# Patient Record
Sex: FEMALE | Race: WHITE | Employment: OTHER | URBAN - METROPOLITAN AREA
[De-identification: names, ages, dates, MRNs, and addresses within clinical notes are randomized per-mention and may not be internally consistent; named-entity substitution may affect disease eponyms.]

---

## 2017-01-23 DIAGNOSIS — E78.2 MIXED HYPERLIPIDEMIA: Primary | ICD-10-CM

## 2017-01-24 RX ORDER — SIMVASTATIN 20 MG
20 TABLET ORAL AT BEDTIME
Qty: 30 TABLET | Refills: 1 | Status: SHIPPED | OUTPATIENT
Start: 2017-01-24 | End: 2017-03-14

## 2017-01-30 DIAGNOSIS — E11.65 TYPE 2 DIABETES MELLITUS WITH HYPERGLYCEMIA, WITHOUT LONG-TERM CURRENT USE OF INSULIN (H): Primary | ICD-10-CM

## 2017-01-30 RX ORDER — PIOGLITAZONEHYDROCHLORIDE 15 MG/1
15 TABLET ORAL DAILY
Qty: 30 TABLET | Refills: 0 | Status: SHIPPED | OUTPATIENT
Start: 2017-01-30 | End: 2017-03-14

## 2017-01-30 NOTE — TELEPHONE ENCOUNTER
Pioglitazone         Last Written Prescription Date: 10/11/2016  Last Fill Quantity: 30, # refills: 3  Last Office Visit with [facility], [facility] or [facility] prescribing provider:  10/11/2016        BP Readings from Last 3 Encounters:   10/11/16 119/80   01/11/10 146/82   07/17/09 124/62     No results found for this basename: microl  No results found for this basename: microalbumin  CREATININE   Date Value Ref Range Status   03/21/2016 0.70 0.55 - 1.02 mg/dL Final   ]  GFR ESTIMATE   Date Value Ref Range Status   03/21/2016 >60 >60 ml/min/1.73m2 Final   11/06/2015 >60 >60 ml/min/1.73m2 Final   03/12/2009 >90 >60 mL/min/1.7m2 Final     GFR ESTIMATE IF BLACK   Date Value Ref Range Status   03/21/2016 >60 >60 ml/min/1.73m2 Final   11/06/2015 >60 >60 ml/min/1.73m2 Final   03/12/2009 >90 >60 mL/min/1.7m2 Final     CHOL      163   3/21/2016  HDL       51   3/21/2016  LDL       65   3/21/2016  LDL       78   2/24/2015  TRIG      235   3/21/2016  CHOLHDLRATIO      3.2   3/21/2016  No results found for this basename: ast  No results found for this basename: alt  A1C      8.5   10/11/2016  A1C     11.1   6/13/2016  A1C      8.2   3/21/2016  A1C      6.8   11/9/2015  A1C      7.1   6/23/2015  POTASSIUM   Date Value Ref Range Status   03/21/2016 4.2 3.5 - 5.1 mmol/L Final     Kiet BULL (R)

## 2017-01-30 NOTE — TELEPHONE ENCOUNTER
Prescription approved per [facility] Refill Protocol.    Molly QUINONEZ RN

## 2017-03-14 ENCOUNTER — OFFICE VISIT (OUTPATIENT)
Dept: FAMILY MEDICINE | Facility: CLINIC | Age: 52
End: 2017-03-14
Payer: COMMERCIAL

## 2017-03-14 VITALS
HEART RATE: 82 BPM | SYSTOLIC BLOOD PRESSURE: 118 MMHG | DIASTOLIC BLOOD PRESSURE: 68 MMHG | WEIGHT: 293 LBS | HEIGHT: 67 IN | BODY MASS INDEX: 45.99 KG/M2

## 2017-03-14 DIAGNOSIS — M43.17 SPONDYLOLISTHESIS AT L5-S1 LEVEL: ICD-10-CM

## 2017-03-14 DIAGNOSIS — F33.1 MODERATE EPISODE OF RECURRENT MAJOR DEPRESSIVE DISORDER (H): ICD-10-CM

## 2017-03-14 DIAGNOSIS — E78.2 MIXED HYPERLIPIDEMIA: ICD-10-CM

## 2017-03-14 DIAGNOSIS — E11.65 TYPE 2 DIABETES MELLITUS WITH HYPERGLYCEMIA, WITHOUT LONG-TERM CURRENT USE OF INSULIN (H): Primary | ICD-10-CM

## 2017-03-14 DIAGNOSIS — E11.65 TYPE 2 DIABETES MELLITUS WITH HYPERGLYCEMIA, WITHOUT LONG-TERM CURRENT USE OF INSULIN (H): ICD-10-CM

## 2017-03-14 LAB
ANION GAP SERPL CALCULATED.3IONS-SCNC: 7 MMOL/L (ref 3–14)
BUN SERPL-MCNC: 10 MG/DL (ref 7–30)
CALCIUM SERPL-MCNC: 8.9 MG/DL (ref 8.5–10.1)
CHLORIDE SERPL-SCNC: 107 MMOL/L (ref 94–109)
CHOLEST SERPL-MCNC: 166 MG/DL
CO2 SERPL-SCNC: 28 MMOL/L (ref 20–32)
CREAT SERPL-MCNC: 0.53 MG/DL (ref 0.52–1.04)
GFR SERPL CREATININE-BSD FRML MDRD: ABNORMAL ML/MIN/1.7M2
GLUCOSE SERPL-MCNC: 214 MG/DL (ref 70–99)
HBA1C MFR BLD: 8.9 % (ref 4.3–6)
LDLC SERPL DIRECT ASSAY-MCNC: 94 MG/DL
POTASSIUM SERPL-SCNC: 3.8 MMOL/L (ref 3.4–5.3)
SODIUM SERPL-SCNC: 142 MMOL/L (ref 133–144)

## 2017-03-14 PROCEDURE — 82465 ASSAY BLD/SERUM CHOLESTEROL: CPT | Performed by: FAMILY MEDICINE

## 2017-03-14 PROCEDURE — 80048 BASIC METABOLIC PNL TOTAL CA: CPT | Performed by: FAMILY MEDICINE

## 2017-03-14 PROCEDURE — 36415 COLL VENOUS BLD VENIPUNCTURE: CPT | Performed by: FAMILY MEDICINE

## 2017-03-14 PROCEDURE — 83036 HEMOGLOBIN GLYCOSYLATED A1C: CPT | Performed by: FAMILY MEDICINE

## 2017-03-14 PROCEDURE — 83721 ASSAY OF BLOOD LIPOPROTEIN: CPT | Performed by: FAMILY MEDICINE

## 2017-03-14 PROCEDURE — 99214 OFFICE O/P EST MOD 30 MIN: CPT | Performed by: FAMILY MEDICINE

## 2017-03-14 RX ORDER — PIOGLITAZONEHYDROCHLORIDE 45 MG/1
45 TABLET ORAL DAILY
Qty: 90 TABLET | Refills: 0 | Status: SHIPPED | OUTPATIENT
Start: 2017-03-14 | End: 2017-05-24

## 2017-03-14 RX ORDER — VENLAFAXINE 75 MG/1
75 TABLET ORAL 2 TIMES DAILY
Qty: 60 TABLET | Refills: 1 | Status: SHIPPED | OUTPATIENT
Start: 2017-03-14 | End: 2017-06-13

## 2017-03-14 RX ORDER — SIMVASTATIN 20 MG
20 TABLET ORAL AT BEDTIME
Qty: 30 TABLET | Refills: 1 | Status: SHIPPED | OUTPATIENT
Start: 2017-03-14 | End: 2017-05-18

## 2017-03-14 ASSESSMENT — ANXIETY QUESTIONNAIRES
3. WORRYING TOO MUCH ABOUT DIFFERENT THINGS: SEVERAL DAYS
6. BECOMING EASILY ANNOYED OR IRRITABLE: SEVERAL DAYS
IF YOU CHECKED OFF ANY PROBLEMS ON THIS QUESTIONNAIRE, HOW DIFFICULT HAVE THESE PROBLEMS MADE IT FOR YOU TO DO YOUR WORK, TAKE CARE OF THINGS AT HOME, OR GET ALONG WITH OTHER PEOPLE: SOMEWHAT DIFFICULT
2. NOT BEING ABLE TO STOP OR CONTROL WORRYING: SEVERAL DAYS
1. FEELING NERVOUS, ANXIOUS, OR ON EDGE: MORE THAN HALF THE DAYS
GAD7 TOTAL SCORE: 7
7. FEELING AFRAID AS IF SOMETHING AWFUL MIGHT HAPPEN: NOT AT ALL
5. BEING SO RESTLESS THAT IT IS HARD TO SIT STILL: SEVERAL DAYS

## 2017-03-14 ASSESSMENT — PAIN SCALES - GENERAL: PAINLEVEL: MODERATE PAIN (4)

## 2017-03-14 ASSESSMENT — PATIENT HEALTH QUESTIONNAIRE - PHQ9: 5. POOR APPETITE OR OVEREATING: SEVERAL DAYS

## 2017-03-14 NOTE — PATIENT INSTRUCTIONS
Increase the pioglitazone to 45 mg daily.    Increase venlafaxine to twice daily.    Schedule the Open MRI when  You can    Recheck in 3 months

## 2017-03-14 NOTE — MR AVS SNAPSHOT
"              After Visit Summary   3/14/2017    Yuki AGEE    MRN: [id number]           Patient Information     Date Of Birth          1965        Visit Information        Provider Department      3/14/2017 3:00 PM KOTA Hernández MD [facility]        Today's Diagnoses     Type 2 diabetes mellitus with hyperglycemia, without long-term current use of insulin (H)    -  1    Mixed hyperlipidemia        Spondylolisthesis at L5-S1 level        Moderate episode of recurrent major depressive disorder (H)          Care Instructions    Increase the pioglitazone to 45 mg daily.    Increase venlafaxine to twice daily.    Schedule the Open MRI when  You         Follow-ups after your visit        Future tests that were ordered for you today     Open Future Orders        Priority Expected Expires Ordered    MR Lumbar Spine w/o Contrast Routine  3/14/2018 3/14/2017            Who to contact     If you have questions or need follow up information about today's clinic visit or your schedule please contact [facility] directly at [phone].  Normal or non-critical lab and imaging results will be communicated to you by NXTMhart, letter or phone within 4 business days after the clinic has received the results. If you do not hear from us within 7 days, please contact the clinic through [employer] or phone. If you have a critical or abnormal lab result, we will notify you by phone as soon as possible.  Submit refill requests through [employer] or call your pharmacy and they will forward the refill request to us. Please allow 3 business days for your refill to be completed.          Additional Information About Your Visit        MyChart Information     [employer] lets you send messages to your doctor, view your test results, renew your prescriptions, schedule appointments and more. To sign up, go to [email or web address] . Click on \"Log in\" on the left side of the screen, which will take you to " "the Welcome page. Then click on \"Sign up Now\" on the right side of the page.     You will be asked to enter the access code listed below, as well as some personal information. Please follow the directions to create your username and password.     Your access code is: [email or web address]  Expires: 2017  3:41 PM     Your access code will  in 90 days. If you need help or a new code, please call your [place] clinic or [phone].        Care EveryWhere ID     This is your Care EveryWhere ID. This could be used by other organizations to access your [place] medical records  [id number]        Your Vitals Were     Pulse Height Breastfeeding? BMI (Body Mass Index)          82 5' 7\" (1.702 m) No 52.47 kg/m2         Blood Pressure from Last 3 Encounters:   17 118/68   10/11/16 119/80   01/11/10 146/82    Weight from Last 3 Encounters:   17 (!) 335 lb (152 kg)   10/11/16 (!) 315 lb (142.9 kg)   01/11/10 (!) 332 lb (150.6 kg)                 Today's Medication Changes          These changes are accurate as of: 3/14/17  3:41 PM.  If you have any questions, ask your nurse or doctor.               These medicines have changed or have updated prescriptions.        Dose/Directions    pioglitazone 45 MG tablet   Commonly known as:  ACTOS   This may have changed:    - medication strength  - how much to take  - additional instructions   Used for:  Type 2 diabetes mellitus with hyperglycemia, without long-term current use of insulin (H)   Changed by:  KOTA Hernández MD        Dose:  45 mg   Take 1 tablet (45 mg) by mouth daily   Quantity:  90 tablet   Refills:  0       venlafaxine 75 MG tablet   Commonly known as:  EFFEXOR   This may have changed:  when to take this   Used for:  Moderate episode of recurrent major depressive disorder (H)   Changed by:  KOTA Hernández MD        Dose:  75 mg   Take 1 tablet (75 mg) by mouth 2 times daily   Quantity:  60 tablet   Refills:  1            Where to get your medicines    "   [facility] [place] [place] [facility] [address]  [address], [place] [address]    Hours:  [facility] [employer] Phone:  [phone]     metFORMIN 1000 MG tablet    pioglitazone 45 MG tablet    simvastatin 20 MG tablet    venlafaxine 75 MG tablet                Primary Care Provider Office Phone # Fax #    Maribel Baker [phone]       [address] [address]        Thank you!     Thank you for choosing [facility]  for your care. Our goal is always to provide you with excellent care. Hearing back from our patients is one way we can continue to improve our services. Please take a few minutes to complete the written survey that you may receive in the mail after your visit with us. Thank you!             Your Updated Medication List - Protect others around you: Learn how to safely use, store and throw away your medicines at www.disposemymeds.org.          This list is accurate as of: 3/14/17  3:41 PM.  Always use your most recent med list.                   Brand Name Dispense Instructions for use    aspirin 81 MG tablet      Take by mouth daily       calcium 600 + D 600-400 MG-UNIT per tablet   Generic drug:  calcium-vitamin D     0    1 tablet daily       FLUTICASONE PROPIONATE (NASAL) 50 MCG/ACT Susp     1 bottle    1 spray each nostril 1-2 times per day       glipiZIDE 10 MG tablet    GLUCOTROL     Take 10 mg by mouth 2 times daily (before meals)       lisinopril-hydrochlorothiazide 10-12.5 MG per tablet    PRINZIDE/ZESTORETIC     Take 1 tablet by mouth daily       loratadine 10 MG tablet    CLARITIN     Take 10 mg by mouth daily       metFORMIN 1000 MG tablet    GLUCOPHAGE    90 tablet    Take 1 tablet (1,000 mg) by mouth 2 times daily (with meals)       pioglitazone 45 MG tablet    ACTOS    90 tablet    Take 1 tablet (45 mg) by mouth daily       simvastatin 20 MG  tablet    ZOCOR    30 tablet    Take 1 tablet (20 mg) by mouth At Bedtime       TYLENOL 8 HOUR 650 MG CR tablet   Generic drug:  acetaminophen      Take 650 mg by mouth every 8 hours as needed for mild pain or fever       venlafaxine 75 MG tablet    EFFEXOR    60 tablet    Take 1 tablet (75 mg) by mouth 2 times daily

## 2017-03-14 NOTE — PROGRESS NOTES
Subjective:  Yuki AGEE is a 52 year old female   Chief Complaint   Patient presents with     Back Pain     She has known spondylolisthesis and several years ago had an episode where she felt a sharp pain and pop in her low back by the sacrum. Interestingly, at that time she had relief of some of the radicular pain down into her legs. Now a couple weeks ago she had a similar episode and since then has had worsening numbness and tingling in both legs. There is no loss of strength or change in bowel or bladder control        Encounter Diagnoses   Name Primary?     Type 2 diabetes mellitus with hyperglycemia, without long-term current use of insulin (H) Yes     Mixed hyperlipidemia      Spondylolisthesis at L5-S1 level      Moderate episode of recurrent major depressive disorder (H)      Diabetes: She has been taking her medications as prescribed without side effects. Her activity level has been limited because of the back problems discussed above    Hyperlipidemia: She is tolerating her statin without significant muscle pain    Depression: Her depression has been somewhat worse so she would like to go to taking her venlafaxine twice a day which she has done in the past with good results. Current symptoms include:  PHQ-9 ([employer]) 3/14/2017   No Interest In Doing Things    Feeling Depressed    Trouble Sleeping    Tired / No Energy    No appetite or Over-Eating    Feeling Bad about Self    Trouble Concentrating    Moving Slow or Restless    Suicidal Thoughts    Total Score    1.  Little interest or pleasure in doing things 1   2.  Feeling down, depressed, or hopeless 1   3.  Trouble falling or staying asleep, or sleeping too much 1   4.  Feeling tired or having little energy 1   5.  Poor appetite or overeating 1   6.  Feeling bad about yourself 0   7.  Trouble concentrating 1   8.  Moving slowly or restless 0   9.  Suicidal or self-harm thoughts 0   PHQ-9 Total Score 6   Difficulty at work, home, or with people  Somewhat difficult        ROS: 5 point ROS neg other than the symptoms noted above in the HPI.      Medical, surgical, social, and family histories, medications and allergies reviewed and updated.    Objective:  Exam:    GENERAL APPEARANCE ADULT: Alert, no acute distress  EYES: PERRL, EOM normal, conjunctiva and lids normal  RESP: lungs clear to auscultation   CV: normal rate, regular rhythm, no murmur or gallop  MS: back exam: Tenderness at the L5-S1 region  NEURO: Reflexes difficult to elicit at both the knees and ankles, sensation altered with numbness of both legs symmetrically    Results for orders placed or performed in visit on 03/14/17   Hemoglobin A1c   Result Value Ref Range    Hemoglobin A1C 8.9 (H) 4.3 - 6.0 %        ASSESSMENT:  1. Type 2 diabetes mellitus with hyperglycemia, without long-term current use of insulin (H)    2. Mixed hyperlipidemia    3. Spondylolisthesis at L5-S1 level    4. Moderate episode of recurrent major depressive disorder (H)        PLAN:  Orders Placed This Encounter     MR Lumbar Spine w/o Contrast  To evaluate whether there is significant nerve impingement from a recent slipped spondylolisthesis      Basic metabolic panel     Cholesterol     LDL cholesterol direct     Hemoglobin A1c     pioglitazone (ACTOS) 45 MG tablet     metFORMIN (GLUCOPHAGE) 1000 MG tablet     simvastatin (ZOCOR) 20 MG tablet     venlafaxine (EFFEXOR) 75 MG tablet       Patient Instructions   Increase the pioglitazone to 45 mg daily.    Increase venlafaxine to twice daily.    Schedule the Open MRI when  You can   Recheck in 3 months

## 2017-03-15 ASSESSMENT — ANXIETY QUESTIONNAIRES: GAD7 TOTAL SCORE: 7

## 2017-03-15 ASSESSMENT — PATIENT HEALTH QUESTIONNAIRE - PHQ9: SUM OF ALL RESPONSES TO PHQ QUESTIONS 1-9: 6

## 2017-03-16 DIAGNOSIS — E11.65 TYPE 2 DIABETES MELLITUS WITH HYPERGLYCEMIA, WITHOUT LONG-TERM CURRENT USE OF INSULIN (H): ICD-10-CM

## 2017-03-16 RX ORDER — PIOGLITAZONEHYDROCHLORIDE 45 MG/1
45 TABLET ORAL DAILY
Qty: 90 TABLET | Refills: 0 | Status: CANCELLED | OUTPATIENT
Start: 2017-03-16

## 2017-03-22 RX ORDER — GLIPIZIDE 10 MG/1
10 TABLET ORAL
Qty: 30 TABLET | Refills: 0 | Status: SHIPPED | OUTPATIENT
Start: 2017-03-22 | End: 2017-04-11

## 2017-03-22 NOTE — TELEPHONE ENCOUNTER
3rd request from pharmacy - Routed to care team to address refill please    Glipizide         Last Written Prescription Date: Historical  Last Fill Quantity: ?, # refills: ?  Last Office Visit with [facility], [facility] or [facility] prescribing provider: 03/14/17       BP Readings from Last 3 Encounters:   03/14/17 118/68   10/11/16 119/80   01/11/10 146/82     No results found for: MICROL  No results found for: MICROALBUMIN  Creatinine   Date Value Ref Range Status   03/14/2017 0.53 0.52 - 1.04 mg/dL Final   ]  GFR Estimate   Date Value Ref Range Status   03/14/2017 >90  Non  GFR Calc   >60 mL/min/1.7m2 Final   03/21/2016 >60 >60 ml/min/1.73m2 Final   11/06/2015 >60 >60 ml/min/1.73m2 Final     GFR Estimate If Black   Date Value Ref Range Status   03/14/2017 >90   GFR Calc   >60 mL/min/1.7m2 Final   03/21/2016 >60 >60 ml/min/1.73m2 Final   11/06/2015 >60 >60 ml/min/1.73m2 Final     Lab Results   Component Value Date    CHOL 166 03/14/2017     Lab Results   Component Value Date    HDL 51 03/21/2016     Lab Results   Component Value Date    LDL 94 03/14/2017    LDL 65 03/21/2016     Lab Results   Component Value Date    TRIG 235 03/21/2016     Lab Results   Component Value Date    CHOLHDLRATIO 3.2 03/21/2016     No results found for: AST  No results found for: ALT  Lab Results   Component Value Date    A1C 8.9 03/14/2017    A1C 8.5 10/11/2016    A1C 11.1 06/13/2016    A1C 8.2 03/21/2016    A1C 6.8 11/09/2015     Potassium   Date Value Ref Range Status   03/14/2017 3.8 3.4 - 5.3 mmol/L Final

## 2017-03-22 NOTE — TELEPHONE ENCOUNTER
Routing refill request to provider for review/approval because:  No results found for: AST  No results found for: ALT    Medication pending   Please advise   Thank you.   Maribel OGDEN RN

## 2017-04-11 DIAGNOSIS — E11.65 TYPE 2 DIABETES MELLITUS WITH HYPERGLYCEMIA, WITHOUT LONG-TERM CURRENT USE OF INSULIN (H): ICD-10-CM

## 2017-04-11 NOTE — TELEPHONE ENCOUNTER
Also received request for One Touch Ultra test strips and Prodigy twist 28g lancets.    Lisinopril/HCTZ     Last Written Prescription Date: Historical  Last Fill Quantity: /, # refills: /  [place] [facility] [place] [facility] or [facility] prescribing provider: 03/14/17       Potassium   Date Value Ref Range Status   03/14/2017 3.8 3.4 - 5.3 mmol/L Final     Creatinine   Date Value Ref Range Status   03/14/2017 0.53 0.52 - 1.04 mg/dL Final     BP Readings from Last 3 Encounters:   03/14/17 118/68   10/11/16 119/80   01/11/10 146/82     Glipizide        Last Written Prescription Date: 03/22/17  Last Fill Quantity: 30, # refills: 0  Last Office Visit with [facility] or [facility] prescribing provider:  03/14/17      No results found for: MICROL  No results found for: UMALCR  Creatinine   Date Value Ref Range Status   03/14/2017 0.53 0.52 - 1.04 mg/dL Final   ]  GFR Estimate   Date Value Ref Range Status   03/14/2017 >90  Non  GFR Calc   >60 mL/min/1.7m2 Final   03/21/2016 >60 >60 ml/min/1.73m2 Final   11/06/2015 >60 >60 ml/min/1.73m2 Final     GFR Estimate If Black   Date Value Ref Range Status   03/14/2017 >90   GFR Calc   >60 mL/min/1.7m2 Final   03/21/2016 >60 >60 ml/min/1.73m2 Final   11/06/2015 >60 >60 ml/min/1.73m2 Final     Lab Results   Component Value Date    CHOL 166 03/14/2017     Lab Results   Component Value Date    HDL 51 03/21/2016     Lab Results   Component Value Date    LDL 94 03/14/2017    LDL 65 03/21/2016     Lab Results   Component Value Date    TRIG 235 03/21/2016     Lab Results   Component Value Date    CHOLHDLRATIO 3.2 03/21/2016     No results found for: AST  No results found for: ALT  Lab Results   Component Value Date    A1C 8.9 03/14/2017    A1C 8.5 10/11/2016    A1C 11.1 06/13/2016    A1C 8.2 03/21/2016    A1C 6.8 11/09/2015

## 2017-04-18 RX ORDER — GLIPIZIDE 10 MG/1
10 TABLET ORAL
Qty: 180 TABLET | Refills: 1 | Status: SHIPPED | OUTPATIENT
Start: 2017-04-18 | End: 2017-09-12

## 2017-04-18 RX ORDER — LISINOPRIL/HYDROCHLOROTHIAZIDE 10-12.5 MG
1 TABLET ORAL DAILY
Qty: 90 TABLET | Refills: 1 | Status: SHIPPED | OUTPATIENT
Start: 2017-04-18 | End: 2017-10-09

## 2017-04-21 ENCOUNTER — TELEPHONE (OUTPATIENT)
Dept: FAMILY MEDICINE | Facility: CLINIC | Age: 52
End: 2017-04-21

## 2017-04-21 DIAGNOSIS — E11.65 TYPE 2 DIABETES MELLITUS WITH HYPERGLYCEMIA, WITHOUT LONG-TERM CURRENT USE OF INSULIN (H): Primary | ICD-10-CM

## 2017-05-18 DIAGNOSIS — E78.2 MIXED HYPERLIPIDEMIA: ICD-10-CM

## 2017-05-18 RX ORDER — SIMVASTATIN 20 MG
TABLET ORAL
Qty: 30 TABLET | Refills: 0 | Status: SHIPPED | OUTPATIENT
Start: 2017-05-18 | End: 2017-06-17

## 2017-05-18 NOTE — TELEPHONE ENCOUNTER
Simvastatin     Last Written Prescription Date: 03/14/17  Last Fill Quantity: 30, # refills: 1  Last Office Visit with [facility] or [facility] prescribing provider: 03/14/17       Lab Results   Component Value Date    CHOL 166 03/14/2017     Lab Results   Component Value Date    HDL 51 03/21/2016     Lab Results   Component Value Date    LDL 94 03/14/2017    LDL 65 03/21/2016     Lab Results   Component Value Date    TRIG 235 03/21/2016     Lab Results   Component Value Date    CHOLHDLRATIO 3.2 03/21/2016

## 2017-05-24 ENCOUNTER — OFFICE VISIT (OUTPATIENT)
Dept: FAMILY MEDICINE | Facility: CLINIC | Age: 52
End: 2017-05-24
Payer: COMMERCIAL

## 2017-05-24 VITALS
SYSTOLIC BLOOD PRESSURE: 118 MMHG | OXYGEN SATURATION: 96 % | BODY MASS INDEX: 53.46 KG/M2 | HEART RATE: 82 BPM | DIASTOLIC BLOOD PRESSURE: 78 MMHG | WEIGHT: 293 LBS | TEMPERATURE: 97.7 F

## 2017-05-24 DIAGNOSIS — Z12.31 ENCOUNTER FOR SCREENING MAMMOGRAM FOR BREAST CANCER: ICD-10-CM

## 2017-05-24 DIAGNOSIS — E11.65 TYPE 2 DIABETES MELLITUS WITH HYPERGLYCEMIA, WITHOUT LONG-TERM CURRENT USE OF INSULIN (H): ICD-10-CM

## 2017-05-24 DIAGNOSIS — M43.16 SPONDYLOLISTHESIS OF LUMBAR REGION: ICD-10-CM

## 2017-05-24 DIAGNOSIS — R20.9 DISTURBANCE OF SKIN SENSATION: Primary | ICD-10-CM

## 2017-05-24 LAB
ERYTHROCYTE [DISTWIDTH] IN BLOOD BY AUTOMATED COUNT: 13 % (ref 10–15)
HCT VFR BLD AUTO: 41.6 % (ref 35–47)
HGB BLD-MCNC: 13.4 G/DL (ref 11.7–15.7)
MCH RBC QN AUTO: 29.5 PG (ref 26.5–33)
MCHC RBC AUTO-ENTMCNC: 32.2 G/DL (ref 31.5–36.5)
MCV RBC AUTO: 92 FL (ref 78–100)
PLATELET # BLD AUTO: 226 10E9/L (ref 150–450)
RBC # BLD AUTO: 4.54 10E12/L (ref 3.8–5.2)
VIT B12 SERPL-MCNC: 315 PG/ML (ref 193–986)
WBC # BLD AUTO: 10.6 10E9/L (ref 4–11)

## 2017-05-24 PROCEDURE — 84443 ASSAY THYROID STIM HORMONE: CPT | Performed by: NURSE PRACTITIONER

## 2017-05-24 PROCEDURE — 83540 ASSAY OF IRON: CPT | Performed by: NURSE PRACTITIONER

## 2017-05-24 PROCEDURE — 82728 ASSAY OF FERRITIN: CPT | Performed by: NURSE PRACTITIONER

## 2017-05-24 PROCEDURE — 36415 COLL VENOUS BLD VENIPUNCTURE: CPT | Performed by: NURSE PRACTITIONER

## 2017-05-24 PROCEDURE — 83550 IRON BINDING TEST: CPT | Performed by: NURSE PRACTITIONER

## 2017-05-24 PROCEDURE — 80053 COMPREHEN METABOLIC PANEL: CPT | Performed by: NURSE PRACTITIONER

## 2017-05-24 PROCEDURE — 99214 OFFICE O/P EST MOD 30 MIN: CPT | Performed by: NURSE PRACTITIONER

## 2017-05-24 PROCEDURE — 85027 COMPLETE CBC AUTOMATED: CPT | Performed by: NURSE PRACTITIONER

## 2017-05-24 PROCEDURE — 82607 VITAMIN B-12: CPT | Performed by: NURSE PRACTITIONER

## 2017-05-24 RX ORDER — PIOGLITAZONEHYDROCHLORIDE 45 MG/1
45 TABLET ORAL DAILY
Qty: 90 TABLET | Refills: 0 | Status: SHIPPED | OUTPATIENT
Start: 2017-05-24 | End: 2017-09-12

## 2017-05-24 RX ORDER — DOCUSATE SODIUM 100 MG/1
100 CAPSULE, LIQUID FILLED ORAL
COMMUNITY
Start: 2013-07-30 | End: 2017-10-10

## 2017-05-24 NOTE — NURSING NOTE
"Chief Complaint   Patient presents with     Musculoskeletal Problem       Initial /78  Pulse 82  Temp 97.7  F (36.5  C) (Oral)  Wt (!) 341 lb 4.8 oz (154.8 kg)  SpO2 96%  BMI 53.46 kg/m2 Estimated body mass index is 53.46 kg/(m^2) as calculated from the following:    Height as of 3/14/17: 5' 7\" (1.702 m).    Weight as of this encounter: 341 lb 4.8 oz (154.8 kg).  Medication Reconciliation: complete   Cynthia CINTRON M.A.      "

## 2017-05-24 NOTE — MR AVS SNAPSHOT
After Visit Summary   5/24/2017    Yuki Boss    MRN: [id number]           Patient Information     Date Of Birth          1965        Visit Information        Provider Department      5/24/2017 11:00 AM Patricia Chan Ra, APRN CNP [facility]        Today's Diagnoses     Disturbance of skin sensation    -  1    Type 2 diabetes mellitus with hyperglycemia, without long-term current use of insulin (H)         Spondylolisthesis of lumbar region          Care Instructions    I have ordered labs to be drawn today.  Monitor your symptoms.  You will receive a call from ortho to schedule an appointment in [place].  You will also receive a call to schedule an MRI as we discussed.            Follow-ups after your visit        Additional Services     ORTHO  REFERRAL       [facility] is referring you to the Orthopedic  Services at [facility].       The  Representative will assist you in the coordination of your Orthopedic and Musculoskeletal Care as prescribed by your physician.    The  Representative will call you within 1 business day to help schedule your appointment, or you may contact the  Representative at:    All areas ~ [phone]     Type of Referral : Spine: Lumbar  **Choose Medical Spine Specialist (unless patient was seen by a Medical Spine Specialist within the past 6 months).**  Surgical Evaluation is advised if the patient presents with one or more of the following red flags: Evidence of Spinal Tumor, Infection or Fracture, Cauda Equina Syndrome, Sudden or Progressive Weakness, Loss of Bowel or Bladder Control, or any other documented emergent neurological condition resulting from a Lumbar Spinal Condition. Medical Spine Specialist        Timeframe requested: Within 2 weeks    Coverage of these services is subject to the terms and limitations of your health insurance plan.  Please  "call member services at your health plan with any benefit or coverage questions.      If X-rays, CT or MRI's have been performed, please contact the facility where they were done to arrange for , prior to your scheduled appointment.  Please bring this referral request to your appointment and present it to your specialist.                  Who to contact     If you have questions or need follow up information about today's clinic visit or your schedule please contact [facility] directly at [phone].  Normal or non-critical lab and imaging results will be communicated to you by MyChart, letter or phone within 4 business days after the clinic has received the results. If you do not hear from us within 7 days, please contact the clinic through [employer] or phone. If you have a critical or abnormal lab result, we will notify you by phone as soon as possible.  Submit refill requests through [employer] or call your pharmacy and they will forward the refill request to us. Please allow 3 business days for your refill to be completed.          Additional Information About Your Visit        [facility] Information     [employer] lets you send messages to your doctor, view your test results, renew your prescriptions, schedule appointments and more. To sign up, go to [email or web address] . Click on \"Log in\" on the left side of the screen, which will take you to the Welcome page. Then click on \"Sign up Now\" on the right side of the page.     You will be asked to enter the access code listed below, as well as some personal information. Please follow the directions to create your username and password.     Your access code is: [email or web address]  Expires: 2017  3:41 PM     Your access code will  in 90 days. If you need help or a new code, please call your [facility] or [phone].        Care EveryWhere ID     This is your Care EveryWhere ID. This could be used by other organizations to access your " [place] medical records  [id number]        Your Vitals Were     Pulse Temperature Pulse Oximetry BMI (Body Mass Index)          82 97.7  F (36.5  C) (Oral) 96% 53.46 kg/m2         Blood Pressure from Last 3 Encounters:   05/24/17 118/78   03/14/17 118/68   10/11/16 119/80    Weight from Last 3 Encounters:   05/24/17 (!) 341 lb 4.8 oz (154.8 kg)   03/14/17 (!) 335 lb (152 kg)   10/11/16 (!) 315 lb (142.9 kg)              We Performed the Following     CBC with platelets     Comprehensive metabolic panel (BMP + Alb, Alk Phos, ALT, AST, Total. Bili, TP)     Ferritin     Iron and iron binding capacity     ORTHO  REFERRAL     TSH with free T4 reflex     Vitamin B12        Primary Care Provider Office Phone # Fax #    Maribel Baker [phone]       [address] [address]        Thank you!     Thank you for choosing [facility]  for your care. Our goal is always to provide you with excellent care. Hearing back from our patients is one way we can continue to improve our services. Please take a few minutes to complete the written survey that you may receive in the mail after your visit with us. Thank you!             Your Updated Medication List - Protect others around you: Learn how to safely use, store and throw away your medicines at www.disposemymeds.org.          This list is accurate as of: 5/24/17 11:59 AM.  Always use your most recent med list.                   Brand Name Dispense Instructions for use    aspirin 81 MG tablet      Take by mouth daily       blood glucose lancets standard    no brand specified    3 Box    Use to test blood sugar once daily or as directed.       blood glucose monitoring test strip    no brand specified    100 strip    Use to test blood sugars one time daily or as directed       FLUTICASONE PROPIONATE (NASAL) 50 MCG/ACT Susp     1 bottle    1 spray each nostril 1-2 times per day       glipiZIDE 10 MG  tablet    GLUCOTROL    180 tablet    Take 1 tablet (10 mg) by mouth 2 times daily (before meals)       lisinopril-hydrochlorothiazide 10-12.5 MG per tablet    PRINZIDE/ZESTORETIC    90 tablet    Take 1 tablet by mouth daily       loratadine 10 MG tablet    CLARITIN     Take 10 mg by mouth daily       metFORMIN 1000 MG tablet    GLUCOPHAGE    90 tablet    Take 1 tablet (1,000 mg) by mouth 2 times daily (with meals)       pioglitazone 45 MG tablet    ACTOS    90 tablet    Take 1 tablet (45 mg) by mouth daily       simvastatin 20 MG tablet    ZOCOR    30 tablet    TAKE 1 TABLET BY MOUTH AT BEDTIME       STOOL SOFTENER 100 MG capsule   Generic drug:  docusate sodium      Take 100 mg by mouth       TYLENOL 8 HOUR 650 MG CR tablet   Generic drug:  acetaminophen      Take 650 mg by mouth every 8 hours as needed for mild pain or fever       venlafaxine 75 MG tablet    EFFEXOR    60 tablet    Take 1 tablet (75 mg) by mouth 2 times daily

## 2017-05-24 NOTE — PROGRESS NOTES
SUBJECTIVE:                                                    Yuki Boss is a 52 year old female who presents to clinic today for the following health issues:      Musculoskeletal problem/pain      Duration: 2 weeks    Description  Location: Hands, face,torso, legs and feet    Intensity:  moderate    Accompanying signs and symptoms: none    History  Previous similar problem: Legs    Previous evaluation:  Yes- MRI ordered but not done    Precipitating or alleviating factors:  Trauma or overuse: no   Aggravating factors include: none    Therapies tried and outcome: nothing     Pt presents with requests to establish care.  She has a hx of DM.  Recent labs showed mildly elevated a1c.  She is compliant with meds.  No low blood sugars, although she will have some feeling of lows when her blood sugars are just below 120.  Her pioglitazone was increased to 45mg daily after the most recent visit.  She is tolerating well and has noticed her fasting sugars which were previously in the 200s have decreased to the 140s-160s.    She has a long hx of low back issues.  She previously received steroid injections, but has not needed over the past 1 year.  She has a known hx of lumbar spondylolisthesis.  Over the past few months she has again been having more issues with low back discomfort and numbness of her lower extremities which is typical for her intermittently but was previously more controlled.    She is here today with concerns regarding new numbness and tingling in different areas of her body.  She has noticed this over the past 2-3 weeks.  Numbness over her scalp, L side of face.  Hands and abdomen.  She denies fever.  No chest pain, palpitations, sob.  Normal intake and output.  She feels weaker than normal.  Recently moved.    Also feels her mood is slightly lower.    Problem list and histories reviewed & adjusted, as indicated.  Additional history: as documented    Patient Active Problem List   Diagnosis      Allergic rhinitis     Obesity, morbid (H)     CARDIOVASCULAR SCREENING; LDL GOAL LESS THAN 130     Type 2 diabetes mellitus with hyperglycemia, without long-term current use of insulin (H)     Essential hypertension with goal blood pressure less than 140/90     Mixed hyperlipidemia     Spondylolisthesis of lumbar region     Left lumbar radiculitis     Moderate episode of recurrent major depressive disorder (H)     Past Surgical History:   Procedure Laterality Date     D & C  10/5/1990     HYSTERECTOMY, PAP NO LONGER INDICATED  2001    bilateral ovaries remain     TONSILLECTOMY         Social History   Substance Use Topics     Smoking status: Current Every Day Smoker     Packs/day: 1.00     Years: 10.00     Smokeless tobacco: Not on file     Alcohol use No     Family History   Problem Relation Age of Onset     Thyroid Disease Mother      CANCER Father      lung, smoker     CANCER Maternal Grandmother      pancreatic     CANCER Paternal Grandfather      lymphoma     Breast Cancer Maternal Aunt      Breast Cancer Paternal Grandmother            Reviewed and updated as needed this visit by clinical staff       Reviewed and updated as needed this visit by Provider         ROS:  SEE HPI.    OBJECTIVE:                                                    /78  Pulse 82  Temp 97.7  F (36.5  C) (Oral)  Wt (!) 341 lb 4.8 oz (154.8 kg)  SpO2 96%  BMI 53.46 kg/m2  Body mass index is 53.46 kg/(m^2).  GENERAL: healthy, alert and no distress  EYES: Eyes grossly normal to inspection, PERRL and conjunctivae and sclerae normal  HENT: ear canals and TM's normal, nose and mouth without ulcers or lesions  NECK: no adenopathy, no asymmetry, masses, or scars and thyroid normal to palpation  RESP: lungs clear to auscultation - no rales, rhonchi or wheezes  CV: regular rate and rhythm, normal S1 S2, no S3 or S4, no murmur, click or rub, no peripheral edema and peripheral pulses strong  ABDOMEN: soft, nontender, bowel sounds normal  and obese.  NEURO: Normal strength and tone, sensory exam grossly normal, mentation intact and cranial nerves 2-12 intact except sensation deficit L side of face.  PSYCH: mentation appears normal, affect normal/bright    Diagnostic Test Results:  none      ASSESSMENT/PLAN:                                                    1. Disturbance of skin sensation  New to clinic.  Hx of DM II, htn, lipid d/o, obesity, lumbar spondylolisthesis here with concerns regarding new onset numbness and tingling over different areas of her body.  Discussed options.  Labs today.  MRI of brain.  Will need open sided.  Referral faxed.  - CBC with platelets  - Comprehensive metabolic panel (BMP + Alb, Alk Phos, ALT, AST, Total. Bili, TP)  - TSH with free T4 reflex  - Vitamin B12  - Ferritin  - Iron and iron binding capacity    2. Type 2 diabetes mellitus with hyperglycemia, without long-term current use of insulin (H)   Labs today, pt reports improvement.  Too early for a1c.  Refilled meds.  - Ferritin  - Iron and iron binding capacity  - metFORMIN (GLUCOPHAGE) 1000 MG tablet; Take 1 tablet (1,000 mg) by mouth 2 times daily (with meals)  Dispense: 180 tablet; Refill: 0  - pioglitazone (ACTOS) 45 MG tablet; Take 1 tablet (45 mg) by mouth daily  Dispense: 90 tablet; Refill: 0    3. Spondylolisthesis of lumbar region  - ORTHO  REFERRAL      5. Encounter for screening mammogram for breast cancer  Had recent mammogram through [facility], received letter that the screening was not up to standards and she needs repeat.  Ordered today.  - *MA Screening Digital Bilateral; Future    Patricia Chan, APRN CNP  [facility]

## 2017-05-24 NOTE — PATIENT INSTRUCTIONS
I have ordered labs to be drawn today.  Monitor your symptoms.  You will receive a call from ortho to schedule an appointment in [place].  You will also receive a call to schedule an MRI as we discussed.

## 2017-05-25 LAB
ALBUMIN SERPL-MCNC: 3.5 G/DL (ref 3.4–5)
ALP SERPL-CCNC: 76 U/L (ref 40–150)
ALT SERPL W P-5'-P-CCNC: 21 U/L (ref 0–50)
ANION GAP SERPL CALCULATED.3IONS-SCNC: 8 MMOL/L (ref 3–14)
AST SERPL W P-5'-P-CCNC: 14 U/L (ref 0–45)
BILIRUB SERPL-MCNC: 0.3 MG/DL (ref 0.2–1.3)
BUN SERPL-MCNC: 11 MG/DL (ref 7–30)
CALCIUM SERPL-MCNC: 9.1 MG/DL (ref 8.5–10.1)
CHLORIDE SERPL-SCNC: 105 MMOL/L (ref 94–109)
CO2 SERPL-SCNC: 26 MMOL/L (ref 20–32)
CREAT SERPL-MCNC: 0.54 MG/DL (ref 0.52–1.04)
FERRITIN SERPL-MCNC: 22 NG/ML (ref 8–252)
GFR SERPL CREATININE-BSD FRML MDRD: ABNORMAL ML/MIN/1.7M2
GLUCOSE SERPL-MCNC: 102 MG/DL (ref 70–99)
IRON SATN MFR SERPL: 18 % (ref 15–46)
IRON SERPL-MCNC: 71 UG/DL (ref 35–180)
POTASSIUM SERPL-SCNC: 4.1 MMOL/L (ref 3.4–5.3)
PROT SERPL-MCNC: 7.2 G/DL (ref 6.8–8.8)
SODIUM SERPL-SCNC: 139 MMOL/L (ref 133–144)
TIBC SERPL-MCNC: 397 UG/DL (ref 240–430)
TSH SERPL DL<=0.005 MIU/L-ACNC: 0.85 MU/L (ref 0.4–4)

## 2017-05-26 ENCOUNTER — HEALTH MAINTENANCE LETTER (OUTPATIENT)
Age: 52
End: 2017-05-26

## 2017-05-30 ENCOUNTER — TRANSFERRED RECORDS (OUTPATIENT)
Dept: HEALTH INFORMATION MANAGEMENT | Facility: CLINIC | Age: 52
End: 2017-05-30

## 2017-06-01 ENCOUNTER — TELEPHONE (OUTPATIENT)
Dept: FAMILY MEDICINE | Facility: CLINIC | Age: 52
End: 2017-06-01

## 2017-06-01 DIAGNOSIS — G62.9 NEUROPATHY: Primary | ICD-10-CM

## 2017-06-01 NOTE — TELEPHONE ENCOUNTER
Pt notified of appointment made at [facility] clinic for 6/30/17 at 12:20pm, arrive 12:05 in [place] with Dr. Jesus Christianson. MRI of the Brain faxed and Pt will get a disc of the MRI per clinic request.  Cynthia CINTRON M.A.

## 2017-06-01 NOTE — TELEPHONE ENCOUNTER
MRI completed showing   Multiple high signal (long TR) mostly deep white matter high signal lesions, some transversely oriented at the corpus callosum, probably areas of gliosis, suggestive of old MS plaques.  9x6mm high signal enhancing lesion at the R midbrain and cerebral peduncle, likely reflecting active inflammation, no other enhancing lesions.    Discussed with pt over the phone.  She would like assistance scheduling neurology appt.  Will have nurse assist and call her with appt details, then fax the copy of MRI to the neurologist.  Pt agrees with plan and verbalized understanding.  LANRE

## 2017-06-13 DIAGNOSIS — F33.1 MODERATE EPISODE OF RECURRENT MAJOR DEPRESSIVE DISORDER (H): ICD-10-CM

## 2017-06-13 NOTE — TELEPHONE ENCOUNTER
venlafaxine (EFFEXOR) 75 MG    Last Written Prescription Date: 3/14/17  Last Fill Quantity: 60, # refills: 1  Last Office Visit with [facility], [facility] or [facility] prescribing provider: 5/24/17        BP Readings from Last 3 Encounters:   05/24/17 118/78   03/14/17 118/68   10/11/16 119/80     Pulse: (for Fetzima)  Creatinine   Date Value Ref Range Status   05/24/2017 0.54 0.52 - 1.04 mg/dL Final   ]    Last PHQ-9 score on record=   PHQ-9 SCORE 3/14/2017   Total Score -   Total Score 6

## 2017-06-16 DIAGNOSIS — F33.1 MODERATE EPISODE OF RECURRENT MAJOR DEPRESSIVE DISORDER (H): ICD-10-CM

## 2017-06-16 RX ORDER — VENLAFAXINE 75 MG/1
TABLET ORAL
Qty: 60 TABLET | Refills: 0 | Status: SHIPPED | OUTPATIENT
Start: 2017-06-16 | End: 2017-07-13

## 2017-06-16 NOTE — TELEPHONE ENCOUNTER
Written 6/16/17        Disp Refills Start End REYMUNDO   venlafaxine (EFFEXOR) 75 MG tablet 60 tablet 0 6/16/2017

## 2017-06-20 RX ORDER — VENLAFAXINE 75 MG/1
TABLET ORAL
Qty: 60 TABLET | Refills: 0 | OUTPATIENT
Start: 2017-06-20

## 2017-08-20 ENCOUNTER — TELEPHONE (OUTPATIENT)
Dept: FAMILY MEDICINE | Facility: CLINIC | Age: 52
End: 2017-08-20

## 2017-08-20 DIAGNOSIS — E11.65 TYPE 2 DIABETES MELLITUS WITH HYPERGLYCEMIA, WITHOUT LONG-TERM CURRENT USE OF INSULIN (H): Primary | ICD-10-CM

## 2017-08-21 NOTE — TELEPHONE ENCOUNTER
Medication Detail      Disp Refills Start End REYMUNDO   metFORMIN (GLUCOPHAGE) 1000 MG tablet 180 tablet 0 5/24/2017  No   Sig: Take 1 tablet (1,000 mg) by mouth 2 times daily (with meals)       Last Office Visit with [facility], P or [facility] prescribing provider:  5/24/17        BP Readings from Last 3 Encounters:   05/24/17 118/78   03/14/17 118/68   10/11/16 119/80     No results found for: MICROL  No results found for: UMALCR  Creatinine   Date Value Ref Range Status   05/24/2017 0.54 0.52 - 1.04 mg/dL Final   ]  GFR Estimate   Date Value Ref Range Status   05/24/2017 >90  Non  GFR Calc   >60 mL/min/1.7m2 Final   03/14/2017 >90  Non  GFR Calc   >60 mL/min/1.7m2 Final   03/21/2016 >60 >60 ml/min/1.73m2 Final     GFR Estimate If Black   Date Value Ref Range Status   05/24/2017 >90   GFR Calc   >60 mL/min/1.7m2 Final   03/14/2017 >90   GFR Calc   >60 mL/min/1.7m2 Final   03/21/2016 >60 >60 ml/min/1.73m2 Final     Lab Results   Component Value Date    CHOL 166 03/14/2017     Lab Results   Component Value Date    HDL 51 03/21/2016     Lab Results   Component Value Date    LDL 94 03/14/2017    LDL 65 03/21/2016     Lab Results   Component Value Date    TRIG 235 03/21/2016     Lab Results   Component Value Date    CHOLHDLRATIO 3.2 03/21/2016     Lab Results   Component Value Date    AST 14 05/24/2017     Lab Results   Component Value Date    ALT 21 05/24/2017     Lab Results   Component Value Date    A1C 8.9 03/14/2017    A1C 8.5 10/11/2016    A1C 11.1 06/13/2016    A1C 8.2 03/21/2016    A1C 6.8 11/09/2015     Potassium   Date Value Ref Range Status   05/24/2017 4.1 3.4 - 5.3 mmol/L Final

## 2017-08-22 NOTE — TELEPHONE ENCOUNTER
Medication is being filled for 1 time refill only due to:  Patient needs to be seen because due for diabetic office visit. elevated A1c..     Luly Smith RN

## 2017-08-28 ENCOUNTER — OFFICE VISIT (OUTPATIENT)
Dept: FAMILY MEDICINE | Facility: CLINIC | Age: 52
End: 2017-08-28
Payer: COMMERCIAL

## 2017-08-28 VITALS
BODY MASS INDEX: 56.34 KG/M2 | HEART RATE: 99 BPM | RESPIRATION RATE: 20 BRPM | SYSTOLIC BLOOD PRESSURE: 128 MMHG | TEMPERATURE: 98.2 F | DIASTOLIC BLOOD PRESSURE: 64 MMHG | WEIGHT: 293 LBS | OXYGEN SATURATION: 96 %

## 2017-08-28 DIAGNOSIS — E66.01 MORBID OBESITY, UNSPECIFIED OBESITY TYPE (H): ICD-10-CM

## 2017-08-28 DIAGNOSIS — E11.65 TYPE 2 DIABETES MELLITUS WITH HYPERGLYCEMIA, WITHOUT LONG-TERM CURRENT USE OF INSULIN (H): Primary | ICD-10-CM

## 2017-08-28 LAB — HBA1C MFR BLD: 8 % (ref 4.3–6)

## 2017-08-28 PROCEDURE — 36415 COLL VENOUS BLD VENIPUNCTURE: CPT | Performed by: NURSE PRACTITIONER

## 2017-08-28 PROCEDURE — 99214 OFFICE O/P EST MOD 30 MIN: CPT | Performed by: NURSE PRACTITIONER

## 2017-08-28 PROCEDURE — 82043 UR ALBUMIN QUANTITATIVE: CPT | Performed by: NURSE PRACTITIONER

## 2017-08-28 PROCEDURE — 83036 HEMOGLOBIN GLYCOSYLATED A1C: CPT | Performed by: NURSE PRACTITIONER

## 2017-08-28 RX ORDER — CETIRIZINE HYDROCHLORIDE 10 MG/1
10 TABLET ORAL DAILY
COMMUNITY
End: 2018-10-15

## 2017-08-28 NOTE — PROGRESS NOTES
SUBJECTIVE:   Yuki Boss is a 52 year old female who presents to clinic today for the following health issues:    Diabetes Follow-up    Patient is checking blood sugars: once daily.  Results are as follows:       am - 162-180        Diabetic concerns: None and other - many concerns     Symptoms of hypoglycemia (low blood sugar): shaky     Paresthesias (numbness or burning in feet) or sores: No   Date of last diabetic eye exam: December 2016      Amount of exercise or physical activity: None    Problems taking medications regularly: Yes,  problems remembering to take - starting to use an sadia on her phone    Medication side effects: none  Diet: diabetic; likes to snack at night    Pt presents for follow up.  Continues to struggle with blood sugars.  She tried to eat a vegan diet for 1 week.  Sugars improved drastically but this was not sustainable for her.  Typically her fasting sugars are around 160.  She does not check this regularly.  She is compliant with her meds.  Finds she continues to gain weight and notes further deconditioning which is bringing her down.  She is seeing neurology for dx of MS.  Debating whether to start anything.  She reports symptoms are stable.  She denies any chest pain.  No GI symptoms.  She does have some urinary symptoms with the MS dx.    Problem list and histories reviewed & adjusted, as indicated.  Additional history: as documented    Patient Active Problem List   Diagnosis     Allergic rhinitis     Obesity, morbid (H)     CARDIOVASCULAR SCREENING; LDL GOAL LESS THAN 130     Type 2 diabetes mellitus with hyperglycemia, without long-term current use of insulin (H)     Essential hypertension with goal blood pressure less than 140/90     Mixed hyperlipidemia     Spondylolisthesis of lumbar region     Left lumbar radiculitis     Moderate episode of recurrent major depressive disorder (H)     Past Surgical History:   Procedure Laterality Date     D & C  10/5/1990     HYSTERECTOMY,  PAP NO LONGER INDICATED  2001    bilateral ovaries remain     TONSILLECTOMY         Social History   Substance Use Topics     Smoking status: Current Every Day Smoker     Packs/day: 1.00     Years: 10.00     Smokeless tobacco: Never Used     Alcohol use No     Family History   Problem Relation Age of Onset     Thyroid Disease Mother      CANCER Father      lung, smoker     CANCER Maternal Grandmother      pancreatic     CANCER Paternal Grandfather      lymphoma     Breast Cancer Maternal Aunt      Breast Cancer Paternal Grandmother              Reviewed and updated as needed this visit by clinical staffTobacco  Allergies  Meds  Med Hx  Surg Hx  Fam Hx  Soc Hx      Reviewed and updated as needed this visit by Provider         ROS:  SEE HPI.    OBJECTIVE:     /64  Pulse 99  Temp 98.2  F (36.8  C) (Oral)  Resp 20  Wt (!) 359 lb 11.2 oz (163.2 kg)  SpO2 96%  Breastfeeding? No  BMI 56.34 kg/m2  Body mass index is 56.34 kg/(m^2).  GENERAL: healthy, alert and no distress  RESP: lungs clear to auscultation - no rales, rhonchi or wheezes  CV: regular rate and rhythm, normal S1 S2, no S3 or S4, no murmur, click or rub, no peripheral edema and peripheral pulses strong  ABDOMEN: soft, nontender, no hepatosplenomegaly, no masses and bowel sounds normal  PSYCH: mentation appears normal, affect normal/bright    Diagnostic Test Results:  Results for orders placed or performed in visit on 08/28/17 (from the past 24 hour(s))   Hemoglobin A1c   Result Value Ref Range    Hemoglobin A1C 8.0 (H) 4.3 - 6.0 %       ASSESSMENT/PLAN:   1. Type 2 diabetes mellitus with hyperglycemia, without long-term current use of insulin (H)  52 y.o. Female, here for DM follow up.  She is motivated to make changes with dx of DM and MS.  She is looking to meet with a dietician, referral placed.  Also have her see MTM.  - DIABETES EDUCATOR REFERRAL  - Hemoglobin A1c  - Albumin Random Urine Quantitative with Creat Ratio; Future    2. Morbid  obesity, unspecified obesity type (H)  Help restart activity after deconditioning.  - [facility] PT, HAND, AND CHIROPRACTIC REFERRAL    MICKEY Aguiar Ra, CNP  [facility]

## 2017-08-28 NOTE — PATIENT INSTRUCTIONS
Schedule with Katlin Rosado for medication management.    Schedule with physical therapy when they call.    Schedule with the diabetes educator when they call.

## 2017-08-28 NOTE — MR AVS SNAPSHOT
After Visit Summary   8/28/2017    Yuki Boss    MRN: [id number]           Patient Information     Date Of Birth          1965        Visit Information        Provider Department      8/28/2017 1:40 PM Patricia Chan Ra, APRN CNP [facility]        Today's Diagnoses     Type 2 diabetes mellitus with hyperglycemia, without long-term current use of insulin (H)    -  1    Morbid obesity, unspecified obesity type (H)          Care Instructions    Schedule with Katlin Rosado for medication management.    Schedule with physical therapy when they call.    Schedule with the diabetes educator when they call.          Follow-ups after your visit        Additional Services     DIABETES EDUCATOR REFERRAL       DIABETES SELF MANAGEMENT TRAINING (DSMT)      Your provider has referred you to Diabetes Education: FMG: Diabetes Education - All [facility] [phone]   [email or web address]    Type of training and number of hours: Previous Diagnosis: Follow-up DSMT - 2 hours.    Medicare covers: 10 hours of initial DSMT in 12 month period from the time of first visit, plus 2 hours of follow-up DSMT annually, and additional hours as requested for insulin training.    Diabetes Type: Type 2 - On Oral Medication             Diabetes Co-Morbidities: dyslipidemia, hypertension and other MS               A1C Goal:  <7.0       A1C is: Lab Results       Component                Value               Date                       A1C                      8.9                 03/14/2017              If an urgent visit is needed or A1C is above 12, Care Team to call the Diabetes Education Team at [phone] or send an In Basket message to the Diabetes Education Pool (P DIAB ED-PATIENT CARE).    Diabetes Education Topics: Knowledge: Healthy Eating    Special Educational Needs Requiring Individual DSMT: None       MEDICAL NUTRITION THERAPY (MNT) for  Diabetes    Medical Nutrition Therapy with a Registered Dietitian can be provided in coordination with Diabetes Self-Management Training to assist in achieving optimal diabetes management.    MNT Type and Hours: Previous diagnosis: Annual follow-up MNT - 2 hours                       Medicare will cover: 3 hours initial MNT in 12 month period after first visit, plus 2 hours of follow-up MNT annually    Please be aware that coverage of these services is subject to the terms and limitations of your health insurance plan.  Call member services at your health plan to determine Diabetes Self-Management Training benefits and ask which blood glucose monitor brands are covered by your plan.      Please bring the following with you to your appointment:    (1)  List of current medications   (2)  List of Blood Glucose Monitor brands that are covered by your insurance plan  (3)  Blood Glucose Monitor and log book  (4)   Food records for the 3 days prior to your visit    The Certified Diabetes Educator may make diabetes medication adjustments per the CDE Protocol and Collaborative Practice Agreement.            [facility] PT, HAND, AND CHIROPRACTIC REFERRAL       **This order will print in the [facility] Scheduling Office**    Physical Therapy, Hand Therapy and Chiropractic Care are available through:    *[facility]  *[facility]  *[facility]    Call one number to schedule at any of the above locations: [phone].    Your provider has referred you to: Physical Therapy at [facility] or [facility]    Indication/Reason for Referral: deconditioning, recent dx of MS  Onset of Illness:   Therapy Orders: Evaluate and Treat  Special Programs: None  Special Request: None    Fredy Daniel      Additional Comments for the Therapist or Chiropractor:       Please be aware that coverage of these services is subject to the terms and limitations of your health insurance plan.  Call member services at your  health plan with any benefit or coverage questions.      Please bring the following to your appointment:    *Your personal calendar for scheduling future appointments  *Comfortable clothing            MED THERAPY MANAGE REFERRAL       Your provider has referred you to: **[place] [facility] Scheduling (numerous locations) [phone]   [email or web address]  FMG: [facility] [phone]   [email or web address]    Reason for Referral: DM control    The [place] [facility] department will contact you to schedule an appointment.  You may also schedule the appointment by calling [phone].  For [place] [facility] [place] patients, please call [phone] to confirm/schedule your appointment on the next business day.    This service is designed to help you get the most from your medications.  A specially trained Pharmacist will work closely with you and your providers to solve any questions, concerns, issues or problems related to your medications.    Please bring all of your prescription and non-prescription medications (such as vitamins, over-the-counter medications, and herbals) or a detailed medication list to your appointment.    If you have a glucose meter or other home monitoring information, please also bring this to your appointment (i.e. blood glucose log, blood pressure log, pain log, etc.).                  Follow-up notes from your care team     Write patient with results Return in 3 months (on 11/28/2017) for QUARTERLY DIABETIC CHECK.      Who to contact     If you have questions or need follow up information about today's clinic visit or your schedule please contact [facility] directly at [phone].  Normal or non-critical lab and imaging results will be communicated to you by MyChart, letter or phone within 4 business days after the  "clinic has received the results. If you do not hear from us within 7 days, please contact the clinic through [employer] or phone. If you have a critical or abnormal lab result, we will notify you by phone as soon as possible.  Submit refill requests through [employer] or call your pharmacy and they will forward the refill request to us. Please allow 3 business days for your refill to be completed.          Additional Information About Your Visit        [employer] Information     [employer] lets you send messages to your doctor, view your test results, renew your prescriptions, schedule appointments and more. To sign up, go to [email or web address] . Click on \"Log in\" on the left side of the screen, which will take you to the Welcome page. Then click on \"Sign up Now\" on the right side of the page.     You will be asked to enter the access code listed below, as well as some personal information. Please follow the directions to create your username and password.     Your access code is: [email or web address]  Expires: 2017  2:40 PM     Your access code will  in 90 days. If you need help or a new code, please call your [facility] clinic or [phone].        Care EveryWhere ID     This is your Care EveryWhere ID. This could be used by other organizations to access your [facility] medical records  [id number]        Your Vitals Were     Pulse Temperature Respirations Pulse Oximetry Breastfeeding? BMI (Body Mass Index)    99 98.2  F (36.8  C) (Oral) 20 96% No 56.34 kg/m2       Blood Pressure from Last 3 Encounters:   17 128/64   17 118/78   17 118/68    Weight from Last 3 Encounters:   17 (!) 359 lb 11.2 oz (163.2 kg)   17 (!) 341 lb 4.8 oz (154.8 kg)   17 (!) 335 lb (152 kg)              We Performed the Following     Albumin Random Urine Quantitative with Creat Ratio     DIABETES EDUCATOR REFERRAL     Hemoglobin A1c     [facility] PT, HAND, AND CHIROPRACTIC REFERRAL     MED THERAPY MANAGE REFERRAL  "       Primary Care Provider Office Phone # Fax #    MICKEY Aguiar Ra, [phone]       [address] [address] [facility] [address]        Equal Access to Services     NELSY SHORT : Hadii aad ku hadvitaliyo Soomaali, waaxda luqadaha, qaybta kaalmada adeegyada, waxdillon noblen salinas dominguez laSaundratodd mendez. So [facility] [phone].    ATENCIÓN: Si habla español, tiene a rose disposición servicios gratuitos de asistencia lingüística. Llame al [phone].    We comply with applicable federal civil rights laws and [place] laws. We do not discriminate on the basis of race, color, national origin, age, disability sex, sexual orientation or gender identity.            Thank you!     Thank you for choosing [facility]  for your care. Our goal is always to provide you with excellent care. Hearing back from our patients is one way we can continue to improve our services. Please take a few minutes to complete the written survey that you may receive in the mail after your visit with us. Thank you!             Your Updated Medication List - Protect others around you: Learn how to safely use, store and throw away your medicines at www.disposemymeds.org.          This list is accurate as of: 8/28/17  2:40 PM.  Always use your most recent med list.                   Brand Name Dispense Instructions for use Diagnosis    aspirin 81 MG tablet      Take by mouth daily        blood glucose lancets standard    no brand specified    3 Box    Use to test blood sugar once daily or as directed.    Type 2 diabetes mellitus with hyperglycemia, without long-term current use of insulin (H)       blood glucose monitoring test strip    no brand specified    100 strip    Use to test blood sugars one time daily or as directed    Type 2 diabetes mellitus with hyperglycemia, without long-term current use of insulin (H)       cetirizine 10 MG tablet    zyrTEC     Take 10 mg by mouth daily        cholecalciferol 5000 UNITS  Caps capsule    vitamin D3     Take 5,000 Units by mouth daily        FLUTICASONE PROPIONATE (NASAL) 50 MCG/ACT Susp     1 bottle    1 spray each nostril 1-2 times per day    Allergic rhinitis, cause unspecified       glipiZIDE 10 MG tablet    GLUCOTROL    180 tablet    Take 1 tablet (10 mg) by mouth 2 times daily (before meals)    Type 2 diabetes mellitus with hyperglycemia, without long-term current use of insulin (H)       lisinopril-hydrochlorothiazide 10-12.5 MG per tablet    PRINZIDE/ZESTORETIC    90 tablet    Take 1 tablet by mouth daily    Type 2 diabetes mellitus with hyperglycemia, without long-term current use of insulin (H)       loratadine 10 MG tablet    CLARITIN     Take 10 mg by mouth daily        metFORMIN 1000 MG tablet    GLUCOPHAGE    60 tablet    TAKE 1 TABLET (1,000 MG) BY MOUTH 2 TIMES DAILY (WITH MEALS)    Type 2 diabetes mellitus with hyperglycemia, without long-term current use of insulin (H)       pioglitazone 45 MG tablet    ACTOS    90 tablet    Take 1 tablet (45 mg) by mouth daily        simvastatin 20 MG tablet    ZOCOR    90 tablet    TAKE 1 TABLET BY MOUTH AT BEDTIME    Mixed hyperlipidemia       STOOL SOFTENER 100 MG capsule   Generic drug:  docusate sodium      Take 100 mg by mouth        TYLENOL 8 HOUR 650 MG CR tablet   Generic drug:  acetaminophen      Take 650 mg by mouth every 8 hours as needed for mild pain or fever        venlafaxine 75 MG tablet    EFFEXOR    60 tablet    TAKE 1 TABLET BY MOUTH 2 TIMES DAILY.    Moderate episode of recurrent major depressive disorder (H)

## 2017-08-28 NOTE — NURSING NOTE
"Chief Complaint   Patient presents with     Diabetes       Initial /64  Pulse 99  Temp 98.2  F (36.8  C) (Oral)  Resp 20  Wt (!) 359 lb 11.2 oz (163.2 kg)  SpO2 96%  Breastfeeding? No  BMI 56.34 kg/m2 Estimated body mass index is 56.34 kg/(m^2) as calculated from the following:    Height as of 3/14/17: 5' 7\" (1.702 m).    Weight as of this encounter: 359 lb 11.2 oz (163.2 kg).  Medication Reconciliation: complete   Miranda Richard CMA (AAMA)      "

## 2017-08-29 ENCOUNTER — TELEPHONE (OUTPATIENT)
Dept: PHARMACY | Facility: OTHER | Age: 52
End: 2017-08-29

## 2017-08-29 DIAGNOSIS — E11.65 TYPE 2 DIABETES MELLITUS WITH HYPERGLYCEMIA, WITHOUT LONG-TERM CURRENT USE OF INSULIN (H): ICD-10-CM

## 2017-08-29 LAB
CREAT UR-MCNC: 157 MG/DL
MICROALBUMIN UR-MCNC: 12 MG/L
MICROALBUMIN/CREAT UR: 7.58 MG/G CR (ref 0–25)

## 2017-08-29 NOTE — TELEPHONE ENCOUNTER
MTM referral from: [place] clinic visit (referral by provider)    MTM referral outreach attempt #1 on August 29, 2017 at 3:55 PM      Outcome: Left Message    Rin Charles MTM Coordinator

## 2017-09-10 DIAGNOSIS — F33.1 MODERATE EPISODE OF RECURRENT MAJOR DEPRESSIVE DISORDER (H): ICD-10-CM

## 2017-09-11 ENCOUNTER — THERAPY VISIT (OUTPATIENT)
Dept: PHYSICAL THERAPY | Facility: CLINIC | Age: 52
End: 2017-09-11
Payer: MEDICARE

## 2017-09-11 DIAGNOSIS — R53.81 PHYSICAL DECONDITIONING: ICD-10-CM

## 2017-09-11 DIAGNOSIS — M54.16 LEFT LUMBAR RADICULITIS: Primary | ICD-10-CM

## 2017-09-11 PROCEDURE — G8978 MOBILITY CURRENT STATUS: HCPCS | Mod: GP | Performed by: PHYSICAL THERAPIST

## 2017-09-11 PROCEDURE — 97110 THERAPEUTIC EXERCISES: CPT | Mod: GP | Performed by: PHYSICAL THERAPIST

## 2017-09-11 PROCEDURE — G8979 MOBILITY GOAL STATUS: HCPCS | Mod: GP | Performed by: PHYSICAL THERAPIST

## 2017-09-11 PROCEDURE — 97162 PT EVAL MOD COMPLEX 30 MIN: CPT | Mod: GP | Performed by: PHYSICAL THERAPIST

## 2017-09-11 PROCEDURE — 97112 NEUROMUSCULAR REEDUCATION: CPT | Mod: GP | Performed by: PHYSICAL THERAPIST

## 2017-09-11 NOTE — TELEPHONE ENCOUNTER
pioglitazone (ACTOS) 45 MG         Last Written Prescription Date: 5/24/17  Last Fill Quantity: 90, # refills: 0  Last Office Visit with G, P or [facility] prescribing provider:  8/28/17   Next 5 appointments (look out 90 days)     Sep 12, 2017 10:30 AM CDT   Office Visit with Katlin Rosado BRIONNA   [facility] ([facility])    [address] [address] [phone]                   BP Readings from Last 3 Encounters:   08/28/17 128/64   05/24/17 118/78   03/14/17 118/68     Lab Results   Component Value Date    MICROL 12 08/28/2017     Lab Results   Component Value Date    UMALCR 7.58 08/28/2017     Creatinine   Date Value Ref Range Status   05/24/2017 0.54 0.52 - 1.04 mg/dL Final   ]  GFR Estimate   Date Value Ref Range Status   05/24/2017 >90  Non  GFR Calc   >60 mL/min/1.7m2 Final   03/14/2017 >90  Non  GFR Calc   >60 mL/min/1.7m2 Final   03/21/2016 >60 >60 ml/min/1.73m2 Final     GFR Estimate If Black   Date Value Ref Range Status   05/24/2017 >90   GFR Calc   >60 mL/min/1.7m2 Final   03/14/2017 >90   GFR Calc   >60 mL/min/1.7m2 Final   03/21/2016 >60 >60 ml/min/1.73m2 Final     Lab Results   Component Value Date    CHOL 166 03/14/2017     Lab Results   Component Value Date    HDL 51 03/21/2016     Lab Results   Component Value Date    LDL 94 03/14/2017    LDL 65 03/21/2016     Lab Results   Component Value Date    TRIG 235 03/21/2016     Lab Results   Component Value Date    CHOLHDLRATIO 3.2 03/21/2016     Lab Results   Component Value Date    AST 14 05/24/2017     Lab Results   Component Value Date    ALT 21 05/24/2017     Lab Results   Component Value Date    A1C 8.0 08/28/2017    A1C 8.9 03/14/2017    A1C 8.5 10/11/2016    A1C 11.1 06/13/2016    A1C 8.2 03/21/2016     Potassium   Date Value Ref Range Status   05/24/2017 4.1 3.4 - 5.3 mmol/L Final

## 2017-09-11 NOTE — MR AVS SNAPSHOT
After Visit Summary   9/11/2017    Yuki Boss    MRN: [id number]           Patient Information     Date Of Birth          1965        Visit Information        Provider Department      9/11/2017 12:40 PM Eliz Cazares, ZIA [facility]        Today's Diagnoses     Left lumbar radiculitis    -  1    Physical deconditioning           Follow-ups after your visit        Your next 10 appointments already scheduled     Sep 12, 2017 10:30 AM CDT   Office Visit with Katlin Rosado [facility] ([facility])    [address] [address] [phone]           Bring a current list of meds and any records pertaining to this visit. For Physicals, please bring immunization records and any forms needing to be filled out. Please arrive 10 minutes early to complete paperwork.            Sep 18, 2017  2:30 PM CDT   MARJ Extremity with Eliz Cazares PT   [facility] [place] [facility] ([facility])    [address] [address] [phone]            Sep 21, 2017  1:30 PM CDT   Diabetic Education with Nancy Toscano   [facility] ([facility])    [address] [address] [phone]              Who to contact     If you have questions or need follow up information about today's clinic visit or your schedule please contact [facility] directly at [phone].  Normal or non-critical lab and imaging results will be communicated to you by MyChart, letter or phone within 4 business days after the clinic has received the results. If you do not hear from us within 7 days, please contact the clinic through MyChart or phone. If you have a critical or abnormal lab result, we will notify you by phone as soon as possible.  Submit refill requests through [employer] or call your pharmacy and they will  "forward the refill request to us. Please allow 3 business days for your refill to be completed.          Additional Information About Your Visit        [employer] Information     [employer] lets you send messages to your doctor, view your test results, renew your prescriptions, schedule appointments and more. To sign up, go to [email or web address] . Click on \"Log in\" on the left side of the screen, which will take you to the Welcome page. Then click on \"Sign up Now\" on the right side of the page.     You will be asked to enter the access code listed below, as well as some personal information. Please follow the directions to create your username and password.     Your access code is: [email or web address]  Expires: 2017  2:40 PM     Your access code will  in 90 days. If you need help or a new code, please call your [facility] clinic or [phone].        Care EveryWhere ID     This is your [facility] EveryWhere ID. This could be used by other organizations to access your [facility] medical records  [id number]         Blood Pressure from Last 3 Encounters:   17 128/64   17 118/78   17 118/68    Weight from Last 3 Encounters:   17 (!) 163.2 kg (359 lb 11.2 oz)   17 (!) 154.8 kg (341 lb 4.8 oz)   17 (!) 152 kg (335 lb)              We Performed the Following     HC PT EVAL, MODERATE COMPLEXITY     MARJ CERT REPORT     NEUROMUSCULAR RE-EDUCATION     THERAPEUTIC EXERCISES        Primary Care Provider Office Phone # Fax #    Patricia MICKEY [facility] [phone] [address] [address] [address]        Equal Access to Services     TERRANCE SHORT : Hadii cherry [place], waaxda luqadaha, qaybta kaalmada tal, cynthia mendez. So [facility] [phone].    ATENCIÓN: Si habla español, tiene a rose disposición servicios gratuitos de asistencia lingüística. Llame al [phone].    We comply with applicable federal civil rights laws and " [place] laws. We do not discriminate on the basis of race, color, national origin, age, disability sex, sexual orientation or gender identity.            Thank you!     Thank you for choosing [facility]  for your care. Our goal is always to provide you with excellent care. Hearing back from our patients is one way we can continue to improve our services. Please take a few minutes to complete the written survey that you may receive in the mail after your visit with us. Thank you!             Your Updated Medication List - Protect others around you: Learn how to safely use, store and throw away your medicines at www.disposemymeds.org.          This list is accurate as of: 9/11/17  1:46 PM.  Always use your most recent med list.                   Brand Name Dispense Instructions for use Diagnosis    aspirin 81 MG tablet      Take by mouth daily        blood glucose lancets standard    no brand specified    3 Box    Use to test blood sugar once daily or as directed.    Type 2 diabetes mellitus with hyperglycemia, without long-term current use of insulin (H)       blood glucose monitoring test strip    no brand specified    100 strip    Use to test blood sugars one time daily or as directed    Type 2 diabetes mellitus with hyperglycemia, without long-term current use of insulin (H)       cetirizine 10 MG tablet    zyrTEC     Take 10 mg by mouth daily        cholecalciferol 5000 UNITS Caps capsule    vitamin D3     Take 5,000 Units by mouth daily        * FLUTICASONE PROPIONATE (NASAL) 50 MCG/ACT Susp     1 bottle    1 spray each nostril 1-2 times per day    Allergic rhinitis, cause unspecified       * fluticasone 50 MCG/ACT spray    FLONASE    1 Bottle    Spray 1-2 sprays into both nostrils daily    Allergic rhinitis due to animal hair and dander, unspecified chronicity       glipiZIDE 10 MG tablet    GLUCOTROL    180 tablet    Take 1 tablet (10 mg) by mouth 2 times daily (before  meals)    Type 2 diabetes mellitus with hyperglycemia, without long-term current use of insulin (H)       lisinopril-hydrochlorothiazide 10-12.5 MG per tablet    PRINZIDE/ZESTORETIC    90 tablet    Take 1 tablet by mouth daily    Type 2 diabetes mellitus with hyperglycemia, without long-term current use of insulin (H)       loratadine 10 MG tablet    CLARITIN     Take 10 mg by mouth daily        metFORMIN 1000 MG tablet    GLUCOPHAGE    60 tablet    TAKE 1 TABLET (1,000 MG) BY MOUTH 2 TIMES DAILY (WITH MEALS)    Type 2 diabetes mellitus with hyperglycemia, without long-term current use of insulin (H)       pioglitazone 45 MG tablet    ACTOS    90 tablet    Take 1 tablet (45 mg) by mouth daily        simvastatin 20 MG tablet    ZOCOR    90 tablet    TAKE 1 TABLET BY MOUTH AT BEDTIME    Mixed hyperlipidemia       STOOL SOFTENER 100 MG capsule   Generic drug:  docusate sodium      Take 100 mg by mouth        TYLENOL 8 HOUR 650 MG CR tablet   Generic drug:  acetaminophen      Take 650 mg by mouth every 8 hours as needed for mild pain or fever        venlafaxine 75 MG tablet    EFFEXOR    60 tablet    TAKE 1 TABLET BY MOUTH 2 TIMES DAILY.    Moderate episode of recurrent major depressive disorder (H)       * Notice:  This list has 2 medication(s) that are the same as other medications prescribed for you. Read the directions carefully, and ask your doctor or other care provider to review them with you.

## 2017-09-11 NOTE — LETTER
"DEPARTMENT OF HEALTH AND HUMAN SERVICES  CENTERS FOR MEDICARE & MEDICAID SERVICES    PLAN/UPDATED PLAN OF PROGRESS FOR OUTPATIENT REHABILITATION    PATIENTS NAME:  Yuki Boss   : 1965  PROVIDER NUMBER:    [id number]  HICN:    [id number]  PROVIDER NAME: [facility]  MEDICAL RECORD NUMBER: [id number]   START OF CARE DATE:  SOC Date: 17   TYPE:  PT  PRIMARY/TREATMENT DIAGNOSIS:  Left lumbar radiculitis  Physical deconditioning  VISITS FROM START OF CARE:  Rxs Used: 1     Subjective:  HPI Comments: Pt is a 53 y/o female smoker who c/o intermittent, worsening 2/10 LBP and LE/knee (either side or both sides) pain since 2017. Pt saw MD for current complaints on 17. PMH includes progressive weakness all 4 limbs from MS (new Dx in May), DMII, HTN, morbid obesity, chronic, episodic LBP (shots \"every 3 months\" until \"my back cracked\" and hasn't had to have shots since for 4 years), B hand numbness. Current complaints WORSE with turning over in bed; leaning over sink; walking further than hallways ('exhausted\"); BETTER with \"being careful\" (avoidance of activities of bending, twisting, lifting, etc)  Pt reports general health is fair  Pt is disabled. Pt is not a formal exerciser. Pt reports no access to equipment, but has Silver Sneakers.  PT goals: \"I want to feel stronger. The MS is really effecting my arms. I used to be able to walk so far with my walker. I could walk down the halls and danielle around in the parking lot. I've gained wait. I just feel helpless.\"  Objective:  Gait:  P. B knee pain and \"weakness\"  Gait Type:  Antalgic   Assistive Devices:  Walker  Neurological:   Unable to check valid neuro due to unsafe standing balance and morbid obesity.   Physical Exam      [facility] Lumbar Evaluation  Posture:  Sitting: poor  Standing: poor  Lordosis: Accentuated  Lateral Shift: no  Correction of Posture: worse  Movement Loss:  Flexion (Flex): min and " pain  Extension (EXT): mod and pain  Side Glide R (SG R): mod  Side Glide L (SG L): min, mod and pain  Conclusion: other (Other Classification: Chronic pain, neuro Dx, deconditioning)      Assessment/Plan:    Patient is a 52 year old female with lumbar complaints.    Patient has the following significant findings with corresponding treatment plan.                Diagnosis 1:  LBP and LE weakness  Pain -  self management, education, directional preference exercise and home program  Decreased ROM/flexibility - manual therapy, therapeutic exercise and home program  Impaired gait - gait training and home program  PATIENTS NAME:  Yuki Boss : 1965    Impaired muscle performance - neuro re-education and home program  Decreased function - therapeutic activities and home program  Therapy Evaluation Codes:   1) History comprised of:   Personal factors that impact the plan of care:      Anxiety, Coping style and Past/current experiences.    Comorbidity factors that impact the plan of care are:      High blood pressure, Numbness/tingling, Osteoarthritis, Overweight and Weakness.     Medications impacting care: High blood pressure.  2) Examination of Body Systems comprised of:   Body structures and functions that impact the plan of care:      Lumbar spine.   Activity limitations that impact the plan of care are:      Stairs, Walking and Sleeping.  3) Clinical presentation characteristics are:   Evolving/Changing.  4) Decision-Making    Moderate complexity using standardized patient assessment instrument and/or measureable assessment of functional outcome.  Cumulative Therapy Evaluation is: Moderate complexity.  Previous and current functional limitations:  (See Goal Flow Sheet for this information)    Short term and Long term goals: (See Goal Flow Sheet for this information)   Communication ability:  Patient appears to be able to clearly communicate and understand verbal and written communication and follow  "directions correctly.  Treatment Explanation - The following has been discussed with the patient:   RX ordered/plan of care  Anticipated outcomes  Possible risks and side effects  This patient would benefit from PT intervention to resume normal activities.   Rehab potential is fair.  Frequency:  1 X week, once daily  Duration:  for 6 weeks  Discharge Plan:  Independent in home treatment program.  Reach maximal therapeutic benefit.          Caregiver Signature/Credentials _____________________________ Date ________       Treating Provider: Eliz Cazares, PT     I have reviewed and certified the need for these services and plan of treatment while under my care.        PHYSICIAN'S SIGNATURE:   _________________________________________  Date___________   Patricia AREVALO, CNP    Certification period:  Beginning of Cert date period: 09/11/17 to  End of Cert period date: 12/09/17     Functional Level Progress Report: Please see attached \"Goal Flow sheet for Functional level.\"    ____X____ Continue Services or       ________ DC Services                Service dates: From  SOC Date: 09/11/17 date to present                         "

## 2017-09-11 NOTE — TELEPHONE ENCOUNTER
venlafaxine (EFFEXOR) 75 MG tablet    Last Written Prescription Date: 08/11/2017  Last Fill Quantity: 60, # refills: 0  Last Office Visit with [facility], [facility] or [facility] prescribing provider: 8/28/2017     Next 5 appointments (look out 90 days)     Sep 12, 2017 10:30 AM CDT   Office Visit with Katlin Rosado, [facility] ([facility]    [id number] [facility] [address] [phone]                   BP Readings from Last 3 Encounters:   08/28/17 128/64   05/24/17 118/78   03/14/17 118/68     Pulse: (for Fetzima)  Creatinine   Date Value Ref Range Status   05/24/2017 0.54 0.52 - 1.04 mg/dL Final   ]    Last PHQ-9 score on record=   PHQ-9 SCORE 3/14/2017   Total Score -   Total Score 6   Some recent data might be hidden

## 2017-09-11 NOTE — PROGRESS NOTES
"Subjective:    HPI Comments: Pt is a 51 y/o female smoker who c/o intermittent, worsening 2/10 LBP and LE/knee (either side or both sides) pain since March 2017. Pt saw MD for current complaints on 8/28/17. PMH includes progressive weakness all 4 limbs from MS (new Dx in May), DMII, HTN, morbid obesity, chronic, episodic LBP (shots \"every 3 months\" until \"my back cracked\" and hasn't had to have shots since for 4 years), B hand numbness. Current complaints WORSE with turning over in bed; leaning over sink; walking further than hallways ('exhausted\"); BETTER with \"being careful\" (avoidance of activities of bending, twisting, lifting, etc)  Pt reports general health is fair  Pt is disabled. Pt is not a formal exerciser. Pt reports no access to equipment, but has Silver Sneakers.  PT goals: \"I want to feel stronger. The MS is really effecting my arms. I used to be able to walk so far with my walker. I could walk down the halls and danielle around in the parking lot. I've gained wait. I just feel helpless.\"                      Objective:      Gait:  P. B knee pain and \"weakness\"  Gait Type:  Antalgic   Assistive Devices:  Walker        Neurological:   Unable to check valid neuro due to unsafe standing balance and morbid obesity.       Physical Exam      [place] Lumbar Evaluation    Posture:  Sitting: poor  Standing: poor  Lordosis: Accentuated  Lateral Shift: no  Correction of Posture: worse    Movement Loss:  Flexion (Flex): min and pain  Extension (EXT): mod and pain  Side Glide R (SG R): mod  Side Glide L (SG L): min, mod and pain      Conclusion: other (Other Classification: Chronic pain, neuro Dx, deconditioning)                                         ROS    Assessment/Plan:      Patient is a 52 year old female with lumbar complaints.    Patient has the following significant findings with corresponding treatment plan.                Diagnosis 1:  LBP and LE weakness  Pain -  self management, education, directional " preference exercise and home program  Decreased ROM/flexibility - manual therapy, therapeutic exercise and home program  Impaired gait - gait training and home program  Impaired muscle performance - neuro re-education and home program  Decreased function - therapeutic activities and home program    Therapy Evaluation Codes:   1) History comprised of:   Personal factors that impact the plan of care:      Anxiety, Coping style and Past/current experiences.    Comorbidity factors that impact the plan of care are:      High blood pressure, Numbness/tingling, Osteoarthritis, Overweight and Weakness.     Medications impacting care: High blood pressure.  2) Examination of Body Systems comprised of:   Body structures and functions that impact the plan of care:      Lumbar spine.   Activity limitations that impact the plan of care are:      Stairs, Walking and Sleeping.  3) Clinical presentation characteristics are:   Evolving/Changing.  4) Decision-Making    Moderate complexity using standardized patient assessment instrument and/or measureable assessment of functional outcome.  Cumulative Therapy Evaluation is: Moderate complexity.    Previous and current functional limitations:  (See Goal Flow Sheet for this information)    Short term and Long term goals: (See Goal Flow Sheet for this information)     Communication ability:  Patient appears to be able to clearly communicate and understand verbal and written communication and follow directions correctly.  Treatment Explanation - The following has been discussed with the patient:   RX ordered/plan of care  Anticipated outcomes  Possible risks and side effects  This patient would benefit from PT intervention to resume normal activities.   Rehab potential is fair.    Frequency:  1 X week, once daily  Duration:  for 6 weeks  Discharge Plan:  Independent in home treatment program.  Reach maximal therapeutic benefit.    Please refer to the daily flowsheet for treatment today,  total treatment time and time spent performing 1:1 timed codes.

## 2017-09-12 ENCOUNTER — OFFICE VISIT (OUTPATIENT)
Dept: PHARMACY | Facility: CLINIC | Age: 52
End: 2017-09-12
Payer: COMMERCIAL

## 2017-09-12 VITALS
DIASTOLIC BLOOD PRESSURE: 79 MMHG | SYSTOLIC BLOOD PRESSURE: 137 MMHG | WEIGHT: 293 LBS | HEART RATE: 89 BPM | BODY MASS INDEX: 55.99 KG/M2

## 2017-09-12 DIAGNOSIS — E66.01 MORBID OBESITY, UNSPECIFIED OBESITY TYPE (H): ICD-10-CM

## 2017-09-12 DIAGNOSIS — Z71.6 ENCOUNTER FOR SMOKING CESSATION COUNSELING: ICD-10-CM

## 2017-09-12 DIAGNOSIS — F41.1 GENERALIZED ANXIETY DISORDER: ICD-10-CM

## 2017-09-12 DIAGNOSIS — I10 ESSENTIAL HYPERTENSION WITH GOAL BLOOD PRESSURE LESS THAN 140/90: ICD-10-CM

## 2017-09-12 DIAGNOSIS — G35 MS (MULTIPLE SCLEROSIS) (H): ICD-10-CM

## 2017-09-12 DIAGNOSIS — E78.2 MIXED HYPERLIPIDEMIA: ICD-10-CM

## 2017-09-12 DIAGNOSIS — E11.65 TYPE 2 DIABETES MELLITUS WITH HYPERGLYCEMIA, WITHOUT LONG-TERM CURRENT USE OF INSULIN (H): Primary | ICD-10-CM

## 2017-09-12 DIAGNOSIS — E55.9 VITAMIN D DEFICIENCY: ICD-10-CM

## 2017-09-12 DIAGNOSIS — J30.9 CHRONIC ALLERGIC RHINITIS, UNSPECIFIED SEASONALITY, UNSPECIFIED TRIGGER: ICD-10-CM

## 2017-09-12 PROCEDURE — 99605 MTMS BY PHARM NP 15 MIN: CPT | Performed by: PHARMACIST

## 2017-09-12 PROCEDURE — 99607 MTMS BY PHARM ADDL 15 MIN: CPT | Performed by: PHARMACIST

## 2017-09-12 RX ORDER — LIRAGLUTIDE 6 MG/ML
1.2 INJECTION SUBCUTANEOUS DAILY
Qty: 6 ML | Refills: 2 | Status: SHIPPED | OUTPATIENT
Start: 2017-09-12 | End: 2017-10-10

## 2017-09-12 RX ORDER — GLIPIZIDE 5 MG/1
TABLET ORAL
COMMUNITY
Start: 2017-09-12 | End: 2017-10-10 | Stop reason: DRUGHIGH

## 2017-09-12 RX ORDER — VENLAFAXINE 75 MG/1
TABLET ORAL
Qty: 60 TABLET | Refills: 0 | OUTPATIENT
Start: 2017-09-12

## 2017-09-12 NOTE — PATIENT INSTRUCTIONS
Recommendations from today's MTM visit:                                                    MTM (medication therapy management) is a service provided by a clinical pharmacist designed to help you get the most of out of your medicines.      Diabetes:  1. Start liraglutide (Victoza) subcutaneous injection daily.  2. Stop pioglitazone (Actos)  3. Decrease glipizide dose to one-half tablet twice daily  4. Continue to monitor blood sugar at least once daily.  5. Plan to start exercising at the [facility] with goal of going three times weekly    Next MTM/pharmacist visit: 1 month    To schedule another MTM appointment, please call the clinic directly or you may call the MTM scheduling line at [phone] or toll-free at [phone].     My Clinical Pharmacist's contact information:                                                      It was a pleasure seeing you today!  Please feel free to contact me with any questions or concerns you have.      Katlin Rosado, PharmD [facility]  Medication Therapy Management Practitioner   #[phone]    You may receive a survey about the MTM services you received.  I would appreciate your feedback to help me serve you better in the future. Please fill it out and return it when you can. Your comments will be anonymous.      My healthcare goals:                                                      Diabetes Goals:    Home Monitoring of Blood Sugars:Fasting  mg/dL and 2 hours after a meal less than 150 mg/dL.    Hemoglobin A1C: Less than 7%. Yours is   Lab Results   Component Value Date    A1C 8.0 08/28/2017   .    Blood Pressure: Less than 140/90mmHg. Yours is /79  Pulse 89  Wt (!) 357 lb 8 oz (162.2 kg)  BMI 55.99 kg/m2.    Cholesterol: You are taking a statin to help decrease the risk of heart disease.    Things you can do to help lower your blood sugars:    Diet: 3 meals and 1-2 snacks per day with 45-60 grams of carbohydrates per meal and 15-30 grams of carbohydrates per  snack. Try to fill your plate at least half-full with vegetables, fill one-quarter full with lean meats or protein, and also make sure you get at least some carbohydrate with every meal.    Exercise: 30 minutes per day of anything that will increase your heart rate and make you break a sweat! Gardening, walking, cleaning the house, changing the oil in your car, etc. If you feel like 30 minutes per day is too much, start small. Even lifting canned foods or working your arms with a resistance band in front of the TV can help.        Hypoglycemia (Low Blood Sugar)     Fast-acting sugar includes a cup of nonfat milk.     Too little sugar (glucose) in your blood is called hypoglycemia or low blood sugar. Low blood sugar usually means anything lower than 70 mg/dL. Talk with your healthcare provider about your target range and what level is too low for you. Diabetes itself doesn t cause low blood sugar. But some of the treatments for diabetes, such as pills or insulin, may raise your risk for it. Low blood sugar may cause you to pass out or have a seizure. So always treat low blood sugar right away, but don't overeat.  Special note: Always carry a source of fast-acting sugar and a snack in case of hypoglycemia.   What you may notice  If you have low blood sugar, you may have one or more of these symptoms:    Shakiness or dizziness    Cold, clammy skin or sweating    Feelings of hunger    Headache    Nervousness    A hard, fast heartbeat    Weakness    Confusion or irritability    Blurred vision    Having nightmares or waking up confused or sweating    Numbness or tingling in the lips or tongue  What you should do  Here are tips to follow if you have hypoglycemia:     First check your blood sugar. If it is too low (out of your target range), eat or drink 15 to 20 grams of fast-acting sugar. This may be 3 to 4 glucose tablets, 4 ounces (half a cup) of fruit juice or regular (nondiet) soda, 8 ounces (1 cup) of fat-free milk,  or 1 tablespoon of honey. Don t take more than this, or your blood sugar may go too high.    Wait 15 minutes. Then recheck your blood sugar if you can.    If your blood sugar is still too low, repeat the steps above and check your blood sugar again. If your blood sugar still has not returned to your target range, contact your healthcare provider or seek emergency care.    Once your blood sugar returns to target range, eat a snack or meal.  Preventing low blood sugar  Things you can do include the following:     If your condition needs a strict treatment plan, eat your meals and snacks at the same times each day. Don t skip meals!    If your treatment plan lets you change when you eat and what you eat, learn how to change the time and dose of your rapid-acting insulin to match this.     Ask your healthcare provider if it is safe for you to drink alcohol. Never drink on an empty stomach.    Take your medicine at the prescribed times.    Always carry a source of fast-acting sugar and a snack when you re away from home.  Other things to do  Additional tips include the following:    Carry a medical ID card, a compact USB drive, or wear a medical alert bracelet or necklace. It should say that you have diabetes. It should also say what to do if you pass out or have a seizure.    Make sure your family, friends, and coworkers know the signs of low blood sugar. Tell them what to do if your blood sugar falls very low and you can t treat yourself.    Keep a glucagon emergency kit handy. Be sure your family, friends, and coworkers know how and when to use it. Check it regularly and replace the glucagon before it expires.    Talk with your health care team about other things you can do to prevent low blood sugar.     If you have unexplained hypoglycemia or hypoglycemia several times, call your healthcare provider.   Date Last Reviewed: 5/1/2016 2000-2017 [email or web address]. [address] [place] [address], [place] [address]. All  rights reserved. This information is not intended as a substitute for professional medical care. Always follow your healthcare professional's instructions.

## 2017-09-12 NOTE — PROGRESS NOTES
SUBJECTIVE/OBJECTIVE:                                                    Yuki Boss is a 52 year old female coming in for an initial visit for Medication Therapy Management.  She was referred to me from Patricia Chan CNP.     Chief Complaint: Diabetes. Wants to go through diabetes medications.    Allergies/ADRs: Reviewed with patient. Dizziness and nausea intolerance with codeine and morphine.   Tobacco: 0-1 pack per day - is not interested in quitting at this time. Pt rolls her own cigarettes using pipe tobacco and averages the equivalent of about one pack per day. Tobacco Cessation Action Plan: Information offered: Patient not interested at this time  Alcohol: not currently using  Caffeine: 4 cups/day of coffee, 2-3 sodas/day (diet)  Activity: None at this time. She will be starting an exercise program at the [facility] offered through her insurance, which includes pool therapy. Will aim to attend these exercise programs 3 times weekly starting at the end of the month.   PMH: Reviewed in Epic    Medication Adherence: Last recorded blood glucose readings were from the end of July 2017. Did not bring in glucometer.    Diabetes:  Pt currently taking pioglitazone 45 mg PO daily, metformin 1000 mg PO BID, glipizide IR 10 mg PO BID. Pt not experiencing any nausea or diarrhea side effects from the metformin. Pt does report some increased edema and possible weight gain, she reports 40 lbs in last year. She reports taking the pioglitazone for about one year now.   Pt is experiencing the following side effects:  edema  SMBG: one time daily or less.   Ranges (from patient's glucose log--forgot meter today): 150-190 fasting; 110-240 post-prandial - see glucose readings chart below.  Symptoms of low blood sugar? shaky, dizzy.   Frequency of hypoglycemia? Sometimes when she does not get lunch on time.  Recent symptoms of high blood sugar? none  Eye exam: due  Foot exam: up to date  Microalbumin is < 30 mg/g. Pt is taking  an ACEi/ARB (lisinopril). No side effects reported, such as dry cough  Aspirin: Taking 81mg daily and denies side effects, like nose bleeds, tarry stools, or abnormal bleeding  Exercise: none currently. Plans to start by end of month. See above  Diet:  Breakfast: instant, low-sugar oatmeal, with light almond milk.    Grilled cheese sandwich or leftovers (meat & veggies)  Dinner: pork chops with green beans  Date FBG/ 2hours post Lunch/2hours post Dinner /2hours post    6/28 155      6/23 186      6/22  112     6/19 186 130     6/17 160      6/15  123     6/14  240     *Most recent recorded blood glucose readings on her log sheet    Hypertension: Current medications include lisinopril/HCTZ 10/12.5 mg QD in the AM. Patient does not self-monitor BP.  Patient reports no current medication side effects. She notes taking her lisinopril/HCTZ in the morning.    Hyperlipidemia: Current therapy includes simvastatin 20 mg once daily.  Pt reports no significant myalgias or other side effects. Had experienced myalgias on an earlier trial of simvastatin, but has not had issues since restarting.     Allergies: fluticasone nasal spray two sprays in each nostril daily, cetirizine QD. No bloody noses or other reported side effects. She feels the nasal spray is working really well and that her allergies are well-controlled.    Obesity: Reports 40 lb weight gain since March 2017. She is not taking any weight loss medications. She notes that her multiple sclerosis has made it more difficult for her to exercise and get around. She will be starting an exercise program at the [facility] for exercise starting at the end of the month.    Vitamin D deficiency: taking vitamin D 5000 IU QD. Reports she has been on this dose for three weeks and was started because her vitamin D level was low. Vitamin D level not available through [facility] records. She reports that starting this medication has had a positive impact on her energy level.     Multiple  sclerosis: Diagnosed in the past year. Seeing a neurologist. Next neurologist visit in October.  Copaxone for treatment has been recommended, but pt hesitant due to side effects. Working on diet and exercise at this time.     Anxiety: venlafaxine 75 mg PO BID. With her recent health problems she feels like her anxiety is definitely worse. Often gets very anxious before health care appointments, but this has not been as bad during her most recent appointments. Does not feel like she needs a dose increase, but may when she attempts to quit smoking.  PHQ-9 SCORE 1/14/2009 7/17/2009 3/14/2017   Total Score 1 18 -   Total Score - - 6   Some recent data might be hidden     TONY-7 SCORE 3/14/2017   Total Score 7   Some recent data might be hidden       Smoking cessation: Pt smokes hand rolled cigarettes with pipe tobacco. She says she is not ready to quit now, but plans to attempt to quit again this winter. She has previously stopped smoking for one year by switching to e-cigarettes. She does not like nicotine patches because of the dreams they give her. She would probably be interested in trying the lozenges or gum.     Current labs include:  BP Readings from Last 3 Encounters:   09/12/17 137/79   08/28/17 128/64   05/24/17 118/78     Today's Vitals: /79  Pulse 89  Wt (!) 357 lb 8 oz (162.2 kg)  BMI 55.99 kg/m2  Lab Results   Component Value Date    A1C 8.0 08/28/2017   .  Lab Results   Component Value Date    CHOL 166 03/14/2017     Lab Results   Component Value Date    TRIG 235 03/21/2016     Lab Results   Component Value Date    HDL 51 03/21/2016     Lab Results   Component Value Date    LDL 94 03/14/2017    LDL 65 03/21/2016       Liver Function Studies -   Recent Labs   Lab Test  05/24/17   1207   PROTTOTAL  7.2   ALBUMIN  3.5   BILITOTAL  0.3   ALKPHOS  76   AST  14   ALT  21       Lab Results   Component Value Date    UCRR 157 08/28/2017    MICROL 12 08/28/2017    UMALCR 7.58 08/28/2017       Last Basic  Metabolic Panel:  Lab Results   Component Value Date     05/24/2017      Lab Results   Component Value Date    POTASSIUM 4.1 05/24/2017     Lab Results   Component Value Date    CHLORIDE 105 05/24/2017     Lab Results   Component Value Date    BUN 11 05/24/2017     Lab Results   Component Value Date    CR 0.54 05/24/2017     GFR Estimate   Date Value Ref Range Status   05/24/2017 >90  Non  GFR Calc   >60 mL/min/1.7m2 Final   03/14/2017 >90  Non  GFR Calc   >60 mL/min/1.7m2 Final   03/21/2016 >60 >60 ml/min/1.73m2 Final     GFR Estimate If Black   Date Value Ref Range Status   05/24/2017 >90   GFR Calc   >60 mL/min/1.7m2 Final   03/14/2017 >90   GFR Calc   >60 mL/min/1.7m2 Final   03/21/2016 >60 >60 ml/min/1.73m2 Final     TSH   Date Value Ref Range Status   05/24/2017 0.85 0.40 - 4.00 mU/L Final   ]    Most Recent Immunizations   Administered Date(s) Administered     Influenza (H1N1) 12/17/2009     Influenza (IIV3) 12/17/2009     Influenza Vaccine IM 3yrs+ 4 Valent IIV4 08/31/2017     Pneumococcal 23 valent 09/03/2010     TDAP Vaccine (Adacel) 07/17/2009     Tetanus 10/02/1967     ASSESSMENT:                                                       Current medications were reviewed today.       Medication Adherence: Needs improvement - see below  Pt's last recorded blood glucose readings were from 2+ months ago. Unsure if patient has been monitoring blood sugar more recently. Glucometer was not brought in at visit.    Diabetes: Needs Improvement. Pt not meeting fasting blood glucose goal of  mg/dL and is not meeting goal A1c of <7%. Blood glucose values provided by patient from June were not at fasting goal. Pt would benefit from additional medication therapy to manage her diabetes. Pt is on max dose of pioglitazone and recommended dose of metformin. Pt may also be experiencing edema and weight gain as side effects of her pioglitazone, which  are both commonly reported side effects. Her weight gain does not support increasing her glipizide dose and this would likely be insufficient in bringing her blood glucose into goal range. It would also increase her risk for hypoglycemia, which she is experiencing occasionally. A weight negative medication like GLP-1 agonist could be considered. Incidence of hypoglycemia with GLP-1 agonists is higher when taken with sulfonylureas. She would benefit from decreasing dose of glipizide when starting GLP-1.     Hypertension: Stable. Patient is meeting BP goal of < 140/90mmHg.     Hyperlipidemia: Stable. Pt is on moderate intensity statin which is indicated based on 2013 ACC/AHA guidelines for lipid management. Could consider high intensity statin based on [id number] ASCVD risk score of 11.4%, but patient is meeting lipid goals of <200 mg/dl total cholesterol, >50 mg/dL HDL, and <100 mg/dL LDL.     Allergic rhinitis: Stable. Pt is satisfied with current management and is not experiencing and medication related side effects.     Obesity: Needs improvement. Pt weight has increased almost 40 lbs since the beginning of the year and is causing some back pain and impacting mobility. Pt would benefit from increased exercise, as well as as weight loss promoting medications. Pool therapy at the [facility] would be good because it is low impact and appears to be covered by her insurance. Pt is on weight promoting medications for diabetes. Use of a GLP-1 agonist may serve to help both improve glucose control as well as promote weight loss.    Vitamin D deficiency: Stable. Pt has been on an appropriate replacement dose of vitamin D for 3 weeks. Pt will be due for a vitamin D level recheck in 3 to 5 weeks.     Multiple Sclerosis: Unimproved. Pt is being followed by neurology.     Anxiety: Stable. Pt has been experiencing some additional anxiety around her recent medical conditions and health care visits. However, this has improved  recently. No medication changes seem necessary at this time.  Due for PHQ-9/TONY.    Smoking cessation: Needs Improvement. Pt continues to use tobacco. Pt does not appear motivated to quit now, but shows interest in trying to quit this upcoming winter.       PLAN:                                                      Diabetes:  1. Discontinue pioglitazone 45 mg PO QD.  2. Start liraglutide (Victoza) 0.6 mg subcutaneously daily for one week, then increase to 1.2 mg daily if tolerated. (patient reports copay of $40 is reasonable)  3. Decrease glipizide from 10 mg PO BID to 5 mg PO BID.  4. Instruct patient to monitor blood glucose at least once daily and how to manage hypoglycemia  5. Encourage pool therapy at the [facility] with a goal of going 3 times weekly at least    Smoking Cessation:  1. Readdress readiness to quit in November/December.     Future visit:  Complete PHQ-9/TONY    I spent 70 minutes with this patient today.  All changes were made via collaborative practice agreement with Patricia Chan Ra. A copy of the visit note was provided to the patient's primary care provider.    Will follow up in one month.    The patient was given a summary of these recommendations as an after visit summary.     Gopal Regan, PharmD IV Student  [facility] [place]    Katlin Rosado, PharmD [facility]  Medication Therapy Management Practitioner   #[phone]

## 2017-09-12 NOTE — TELEPHONE ENCOUNTER
Patient has appointment with pharmacy today. Will defer to appointment.  Karley Bob, RN  Triage Nurse

## 2017-09-12 NOTE — MR AVS SNAPSHOT
After Visit Summary   9/12/2017    Yuki Boss    MRN: [id number]           Patient Information     Date Of Birth          1965        Visit Information        Provider Department      9/12/2017 10:30 AM Katlin Rosado, [facility] MTM        Today's Diagnoses     Type 2 diabetes mellitus with hyperglycemia, without long-term current use of insulin (H)    -  1      Care Instructions    Recommendations from today's MTM visit:                                                    MTM (medication therapy management) is a service provided by a clinical pharmacist designed to help you get the most of out of your medicines.      Diabetes:  1. Start liraglutide (Victoza) subcutaneous injection daily.  2. Stop pioglitazone (Actos)  3. Decrease glipizide dose to one-half tablet twice daily  4. Continue to monitor blood sugar at least once daily.  5. Plan to start exercising at the [facility] with goal of going three times weekly    Next MTM/pharmacist visit: 1 month    To schedule another MTM appointment, please call the clinic directly or you may call the MTM scheduling line at [phone] or toll-free at [phone].     My Clinical Pharmacist's contact information:                                                      It was a pleasure seeing you today!  Please feel free to contact me with any questions or concerns you have.      Katlin Rosado, PharmD [facility]  Medication Therapy Management Practitioner   #[phone]    You may receive a survey about the MTM services you received.  I would appreciate your feedback to help me serve you better in the future. Please fill it out and return it when you can. Your comments will be anonymous.      My healthcare goals:                                                      Diabetes Goals:    Home Monitoring of Blood Sugars:Fasting  mg/dL and 2 hours after a meal less than 150 mg/dL.    Hemoglobin A1C: Less than 7%. Yours is   Lab  Results   Component Value Date    A1C 8.0 08/28/2017   .    Blood Pressure: Less than 140/90mmHg. Yours is /79  Pulse 89  Wt (!) 357 lb 8 oz (162.2 kg)  BMI 55.99 kg/m2.    Cholesterol: You are taking a statin to help decrease the risk of heart disease.    Things you can do to help lower your blood sugars:    Diet: 3 meals and 1-2 snacks per day with 45-60 grams of carbohydrates per meal and 15-30 grams of carbohydrates per snack. Try to fill your plate at least half-full with vegetables, fill one-quarter full with lean meats or protein, and also make sure you get at least some carbohydrate with every meal.    Exercise: 30 minutes per day of anything that will increase your heart rate and make you break a sweat! Gardening, walking, cleaning the house, changing the oil in your car, etc. If you feel like 30 minutes per day is too much, start small. Even lifting canned foods or working your arms with a resistance band in front of the TV can help.        Hypoglycemia (Low Blood Sugar)     Fast-acting sugar includes a cup of nonfat milk.     Too little sugar (glucose) in your blood is called hypoglycemia or low blood sugar. Low blood sugar usually means anything lower than 70 mg/dL. Talk with your healthcare provider about your target range and what level is too low for you. Diabetes itself doesn t cause low blood sugar. But some of the treatments for diabetes, such as pills or insulin, may raise your risk for it. Low blood sugar may cause you to pass out or have a seizure. So always treat low blood sugar right away, but don't overeat.  Special note: Always carry a source of fast-acting sugar and a snack in case of hypoglycemia.   What you may notice  If you have low blood sugar, you may have one or more of these symptoms:    Shakiness or dizziness    Cold, clammy skin or sweating    Feelings of hunger    Headache    Nervousness    A hard, fast heartbeat    Weakness    Confusion or irritability    Blurred  vision    Having nightmares or waking up confused or sweating    Numbness or tingling in the lips or tongue  What you should do  Here are tips to follow if you have hypoglycemia:     First check your blood sugar. If it is too low (out of your target range), eat or drink 15 to 20 grams of fast-acting sugar. This may be 3 to 4 glucose tablets, 4 ounces (half a cup) of fruit juice or regular (nondiet) soda, 8 ounces (1 cup) of fat-free milk, or 1 tablespoon of honey. Don t take more than this, or your blood sugar may go too high.    Wait 15 minutes. Then recheck your blood sugar if you can.    If your blood sugar is still too low, repeat the steps above and check your blood sugar again. If your blood sugar still has not returned to your target range, contact your healthcare provider or seek emergency care.    Once your blood sugar returns to target range, eat a snack or meal.  Preventing low blood sugar  Things you can do include the following:     If your condition needs a strict treatment plan, eat your meals and snacks at the same times each day. Don t skip meals!    If your treatment plan lets you change when you eat and what you eat, learn how to change the time and dose of your rapid-acting insulin to match this.     Ask your healthcare provider if it is safe for you to drink alcohol. Never drink on an empty stomach.    Take your medicine at the prescribed times.    Always carry a source of fast-acting sugar and a snack when you re away from home.  Other things to do  Additional tips include the following:    Carry a medical ID card, a compact USB drive, or wear a medical alert bracelet or necklace. It should say that you have diabetes. It should also say what to do if you pass out or have a seizure.    Make sure your family, friends, and coworkers know the signs of low blood sugar. Tell them what to do if your blood sugar falls very low and you can t treat yourself.    Keep a glucagon emergency kit handy. Be  sure your family, friends, and coworkers know how and when to use it. Check it regularly and replace the glucagon before it expires.    Talk with your health care team about other things you can do to prevent low blood sugar.     If you have unexplained hypoglycemia or hypoglycemia several times, call your healthcare provider.   Date Last Reviewed: 5/1/2016 2000-2017 [employer]. [address] [place] [address]. All rights reserved. This information is not intended as a substitute for professional medical care. Always follow your healthcare professional's instructions.                Follow-ups after your visit        Your next 10 appointments already scheduled     Sep 18, 2017  2:30 PM CDT   MARJ Extremity with Eliz Cazares PT   [facility] ([facility])    [address] [facility] [address] [phone]            Sep 21, 2017  1:30 PM CDT   Diabetic Education with [facility] Toscano   [facility]    [address] [facility] [id number] [phone]            Oct 10, 2017 11:00 AM CDT   SHORT with Katlin Rosado [facility] ([facility])    [address] [facility] [address] [address] [phone]              Who to contact     If you have questions or need follow up information about today's clinic visit or your schedule please contact [facility] directly at [phone].  Normal or non-critical lab and imaging results will be communicated to you by MyChart, letter or phone within 4 business days after the clinic has received the results. If you do not hear from us within 7 days, please contact the clinic through MyChart or phone. If you have a critical or abnormal lab result, we will notify you by phone as soon as possible.  Submit refill requests through [employer] or call your pharmacy and they will forward the  refill request to us. Please allow 3 business days for your refill to be completed.          Additional Information About Your Visit        [facility]mam[facility] Information     [employer] gives you secure access to your electronic health record. If you see a primary care provider, you can also send messages to your care team and make appointments. If you have questions, please call your primary care clinic.  If you do not have a primary care provider, please call [phone] and they will assist you.        Care EveryWhere ID     This is your Care EveryWhere ID. This could be used by other organizations to access your [facility] medical records  [id number]        Your Vitals Were     Pulse BMI (Body Mass Index)                89 55.99 kg/m2           Blood Pressure from Last 3 Encounters:   09/12/17 137/79   08/28/17 128/64   05/24/17 118/78    Weight from Last 3 Encounters:   09/12/17 (!) 357 lb 8 oz (162.2 kg)   08/28/17 (!) 359 lb 11.2 oz (163.2 kg)   05/24/17 (!) 341 lb 4.8 oz (154.8 kg)              Today, you had the following     No orders found for display         Today's Medication Changes          These changes are accurate as of: 9/12/17 11:41 AM.  If you have any questions, ask your nurse or doctor.               Start taking these medicines.        Dose/Directions    insulin pen needle 31G X 5 MM   Used for:  Type 2 diabetes mellitus with hyperglycemia, without long-term current use of insulin (H)        See Admin Instructions Use 1 pen needles daily or as directed.   Quantity:  100 each   Refills:  3       liraglutide 18 MG/3ML soln   Commonly known as:  VICTOZA PEN   Used for:  Type 2 diabetes mellitus with hyperglycemia, without long-term current use of insulin (H)        Dose:  1.2 mg   Inject 1.2 mg Subcutaneous daily Use 0.6mg once daily for 1 week and then increase to 1.2mg   Quantity:  6 mL   Refills:  2         These medicines have changed or have updated prescriptions.        Dose/Directions    glipiZIDE 5  MG tablet   Commonly known as:  GLUCOTROL   This may have changed:    - medication strength  - how much to take  - how to take this  - when to take this  - additional instructions   Used for:  Type 2 diabetes mellitus with hyperglycemia, without long-term current use of insulin (H)        Take 1/2 tablet (5mg) twice daily   Refills:  0            Where to get your medicines      These medications [place] [facility] [place] - [address]  [address] [facility] [address]     Phone:  [phone]     insulin pen needle 31G X 5 MM    liraglutide 18 MG/3ML soln                Primary Care Provider Office Phone # Fax #    Patricia Chan MICKEY [phone] [address]  [facility] [address]        Equal Access to Services     [facility]: Hadii aad ku hadasho Soomaali, waaxda luqadaha, qaybta kaalmada adeegyada, waxdillon gilliland . So [facility] [phone].    ATENCIÓN: Si habla español, tiene a rose disposición servicios gratuitos de asistencia lingüística. Soila al [phone].    We comply with applicable federal civil rights laws and [place] laws. We do not discriminate on the basis of race, color, national origin, age, disability sex, sexual orientation or gender identity.            Thank you!     Thank you for choosing [facility]  for your care. Our goal is always to provide you with excellent care. Hearing back from our patients is one way we can continue to improve our services. Please take a few minutes to complete the written survey that you may receive in the mail after your visit with us. Thank you!             Your Updated Medication List - Protect others around you: Learn how to safely use, store and throw away your medicines at www.disposemymeds.org.          This list is accurate as of: 9/12/17 11:41 AM.  Always use your most recent med list.                   Brand Name Dispense Instructions  for use Diagnosis    aspirin 81 MG tablet      Take by mouth daily        blood glucose lancets standard    no brand specified    3 Box    Use to test blood sugar once daily or as directed.    Type 2 diabetes mellitus with hyperglycemia, without long-term current use of insulin (H)       blood glucose monitoring test strip    no brand specified    100 strip    Use to test blood sugars one time daily or as directed    Type 2 diabetes mellitus with hyperglycemia, without long-term current use of insulin (H)       cetirizine 10 MG tablet    zyrTEC     Take 10 mg by mouth daily        cholecalciferol 5000 UNITS Caps capsule    vitamin D3     Take 5,000 Units by mouth daily        * FLUTICASONE PROPIONATE (NASAL) 50 MCG/ACT Susp     1 bottle    1 spray each nostril 1-2 times per day    Allergic rhinitis, cause unspecified       * fluticasone 50 MCG/ACT spray    FLONASE    1 Bottle    Spray 1-2 sprays into both nostrils daily    Allergic rhinitis due to animal hair and dander, unspecified chronicity       glipiZIDE 5 MG tablet    GLUCOTROL     Take 1/2 tablet (5mg) twice daily    Type 2 diabetes mellitus with hyperglycemia, without long-term current use of insulin (H)       insulin pen needle 31G X 5 MM     100 each    See Admin Instructions Use 1 pen needles daily or as directed.    Type 2 diabetes mellitus with hyperglycemia, without long-term current use of insulin (H)       liraglutide 18 MG/3ML soln    VICTOZA PEN    6 mL    Inject 1.2 mg Subcutaneous daily Use 0.6mg once daily for 1 week and then increase to 1.2mg    Type 2 diabetes mellitus with hyperglycemia, without long-term current use of insulin (H)       lisinopril-hydrochlorothiazide 10-12.5 MG per tablet    PRINZIDE/ZESTORETIC    90 tablet    Take 1 tablet by mouth daily    Type 2 diabetes mellitus with hyperglycemia, without long-term current use of insulin (H)       loratadine 10 MG tablet    CLARITIN     Take 10 mg by mouth daily        metFORMIN 1000 MG  tablet    GLUCOPHAGE    60 tablet    TAKE 1 TABLET (1,000 MG) BY MOUTH 2 TIMES DAILY (WITH MEALS)    Type 2 diabetes mellitus with hyperglycemia, without long-term current use of insulin (H)       simvastatin 20 MG tablet    ZOCOR    90 tablet    TAKE 1 TABLET BY MOUTH AT BEDTIME    Mixed hyperlipidemia       STOOL SOFTENER 100 MG capsule   Generic drug:  docusate sodium      Take 100 mg by mouth        TYLENOL 8 HOUR 650 MG CR tablet   Generic drug:  acetaminophen      Take 650 mg by mouth every 8 hours as needed for mild pain or fever        venlafaxine 75 MG tablet    EFFEXOR    60 tablet    TAKE 1 TABLET BY MOUTH 2 TIMES DAILY.    Moderate episode of recurrent major depressive disorder (H)       * Notice:  This list has 2 medication(s) that are the same as other medications prescribed for you. Read the directions carefully, and ask your doctor or other care provider to review them with you.

## 2017-09-14 RX ORDER — PIOGLITAZONEHYDROCHLORIDE 45 MG/1
TABLET ORAL
Qty: 90 TABLET | Refills: 0 | OUTPATIENT
Start: 2017-09-14

## 2017-09-14 NOTE — TELEPHONE ENCOUNTER
Noted encounter this week with pharmacist this medication was discontinued.  Sent back with note to pharmacy to discontinue from her profile.  Karley Bob, RN  Triage Nurse

## 2017-09-20 DIAGNOSIS — E11.65 TYPE 2 DIABETES MELLITUS WITH HYPERGLYCEMIA, WITHOUT LONG-TERM CURRENT USE OF INSULIN (H): ICD-10-CM

## 2017-09-21 NOTE — TELEPHONE ENCOUNTER
metFORMIN (GLUCOPHAGE) 1000 MG         Last Written Prescription Date: 8/22/17  Last Fill Quantity: 60, # refills: 0  Last Office Visit with G, P or [facility] prescribing provider:  8/28/17   Next 5 appointments (look out 90 days)     Oct 10, 2017 11:00 AM CDT   SHORT with Katlin Rosado BRIONNA   [facility] ([facility])    [address] [facility] [address] [phone]                   BP Readings from Last 3 Encounters:   09/12/17 137/79   08/28/17 128/64   05/24/17 118/78     Lab Results   Component Value Date    MICROL 12 08/28/2017     Lab Results   Component Value Date    UMALCR 7.58 08/28/2017     Creatinine   Date Value Ref Range Status   05/24/2017 0.54 0.52 - 1.04 mg/dL Final   ]  GFR Estimate   Date Value Ref Range Status   05/24/2017 >90  Non  GFR Calc   >60 mL/min/1.7m2 Final   03/14/2017 >90  Non  GFR Calc   >60 mL/min/1.7m2 Final   03/21/2016 >60 >60 ml/min/1.73m2 Final     GFR Estimate If Black   Date Value Ref Range Status   05/24/2017 >90   GFR Calc   >60 mL/min/1.7m2 Final   03/14/2017 >90   GFR Calc   >60 mL/min/1.7m2 Final   03/21/2016 >60 >60 ml/min/1.73m2 Final     Lab Results   Component Value Date    CHOL 166 03/14/2017     Lab Results   Component Value Date    HDL 51 03/21/2016     Lab Results   Component Value Date    LDL 94 03/14/2017    LDL 65 03/21/2016     Lab Results   Component Value Date    TRIG 235 03/21/2016     Lab Results   Component Value Date    CHOLHDLRATIO 3.2 03/21/2016     Lab Results   Component Value Date    AST 14 05/24/2017     Lab Results   Component Value Date    ALT 21 05/24/2017     Lab Results   Component Value Date    A1C 8.0 08/28/2017    A1C 8.9 03/14/2017    A1C 8.5 10/11/2016    A1C 11.1 06/13/2016    A1C 8.2 03/21/2016     Potassium   Date Value Ref Range Status   05/24/2017 4.1 3.4 - 5.3 mmol/L Final

## 2017-09-28 DIAGNOSIS — E11.65 TYPE 2 DIABETES MELLITUS WITH HYPERGLYCEMIA, WITHOUT LONG-TERM CURRENT USE OF INSULIN (H): ICD-10-CM

## 2017-09-28 NOTE — TELEPHONE ENCOUNTER
metFORMIN (GLUCOPHAGE) 1000 MG         Last Written Prescription Date: 9/21/17  Last Fill Quantity: 60, # refills: 0  Last Office Visit with G, P or [facility] prescribing provider:  8/28/17   Next 5 appointments (look out 90 days)     Oct 10, 2017 11:00 AM CDT   SHORT with Katlin Rosado BRIONNA   [facility] ([facility])    [address] [address] [phone]                   BP Readings from Last 3 Encounters:   09/12/17 137/79   08/28/17 128/64   05/24/17 118/78     Lab Results   Component Value Date    MICROL 12 08/28/2017     Lab Results   Component Value Date    UMALCR 7.58 08/28/2017     Creatinine   Date Value Ref Range Status   05/24/2017 0.54 0.52 - 1.04 mg/dL Final   ]  GFR Estimate   Date Value Ref Range Status   05/24/2017 >90  Non  GFR Calc   >60 mL/min/1.7m2 Final   03/14/2017 >90  Non  GFR Calc   >60 mL/min/1.7m2 Final   03/21/2016 >60 >60 ml/min/1.73m2 Final     GFR Estimate If Black   Date Value Ref Range Status   05/24/2017 >90   GFR Calc   >60 mL/min/1.7m2 Final   03/14/2017 >90   GFR Calc   >60 mL/min/1.7m2 Final   03/21/2016 >60 >60 ml/min/1.73m2 Final     Lab Results   Component Value Date    CHOL 166 03/14/2017     Lab Results   Component Value Date    HDL 51 03/21/2016     Lab Results   Component Value Date    LDL 94 03/14/2017    LDL 65 03/21/2016     Lab Results   Component Value Date    TRIG 235 03/21/2016     Lab Results   Component Value Date    CHOLHDLRATIO 3.2 03/21/2016     Lab Results   Component Value Date    AST 14 05/24/2017     Lab Results   Component Value Date    ALT 21 05/24/2017     Lab Results   Component Value Date    A1C 8.0 08/28/2017    A1C 8.9 03/14/2017    A1C 8.5 10/11/2016    A1C 11.1 06/13/2016    A1C 8.2 03/21/2016     Potassium   Date Value Ref Range Status   05/24/2017 4.1 3.4 - 5.3 mmol/L Final

## 2017-09-29 DIAGNOSIS — F33.1 MODERATE EPISODE OF RECURRENT MAJOR DEPRESSIVE DISORDER (H): ICD-10-CM

## 2017-10-02 NOTE — TELEPHONE ENCOUNTER
90 day supply    venlafaxine (EFFEXOR) 75 MG    Last Written Prescription Date: 9/15/17  Last Fill Quantity: 60, # refills: 0  Last Office Visit with [facility], [facility] or [facility] prescribing provider: 8/28/17   Next 5 appointments (look out 90 days)     Oct 10, 2017 11:00 AM CDT   SHORT with Katlin Rosado [facility] ([facility]    [address] [address] [phone]                   BP Readings from Last 3 Encounters:   09/12/17 137/79   08/28/17 128/64   05/24/17 118/78     Pulse: (for Fetzima)  Creatinine   Date Value Ref Range Status   05/24/2017 0.54 0.52 - 1.04 mg/dL Final   ]    Last PHQ-9 score on record=   PHQ-9 SCORE 3/14/2017   Total Score -   Total Score 6   Some recent data might be hidden

## 2017-10-04 NOTE — TELEPHONE ENCOUNTER
Sent PHQ 9 to update her chart, this is a 90 day   Request. Appointment next week with FATMATA.  Karley Bob, RN  Triage Nurse

## 2017-10-05 RX ORDER — VENLAFAXINE 75 MG/1
75 TABLET ORAL 2 TIMES DAILY
Qty: 180 TABLET | Refills: 1 | Status: SHIPPED | OUTPATIENT
Start: 2017-10-05 | End: 2018-03-15

## 2017-10-09 DIAGNOSIS — E11.65 TYPE 2 DIABETES MELLITUS WITH HYPERGLYCEMIA, WITHOUT LONG-TERM CURRENT USE OF INSULIN (H): ICD-10-CM

## 2017-10-10 ENCOUNTER — OFFICE VISIT (OUTPATIENT)
Dept: PHARMACY | Facility: CLINIC | Age: 52
End: 2017-10-10
Payer: COMMERCIAL

## 2017-10-10 VITALS
WEIGHT: 293 LBS | SYSTOLIC BLOOD PRESSURE: 122 MMHG | DIASTOLIC BLOOD PRESSURE: 73 MMHG | HEART RATE: 95 BPM | BODY MASS INDEX: 54.32 KG/M2

## 2017-10-10 DIAGNOSIS — I10 ESSENTIAL HYPERTENSION WITH GOAL BLOOD PRESSURE LESS THAN 140/90: ICD-10-CM

## 2017-10-10 DIAGNOSIS — E78.2 MIXED HYPERLIPIDEMIA: ICD-10-CM

## 2017-10-10 DIAGNOSIS — E55.9 VITAMIN D DEFICIENCY: Primary | ICD-10-CM

## 2017-10-10 DIAGNOSIS — Z72.0 TOBACCO ABUSE: ICD-10-CM

## 2017-10-10 DIAGNOSIS — F41.1 GENERALIZED ANXIETY DISORDER: ICD-10-CM

## 2017-10-10 DIAGNOSIS — E11.65 TYPE 2 DIABETES MELLITUS WITH HYPERGLYCEMIA, WITHOUT LONG-TERM CURRENT USE OF INSULIN (H): ICD-10-CM

## 2017-10-10 DIAGNOSIS — E66.01 MORBID OBESITY, UNSPECIFIED OBESITY TYPE (H): ICD-10-CM

## 2017-10-10 DIAGNOSIS — G35 MS (MULTIPLE SCLEROSIS) (H): ICD-10-CM

## 2017-10-10 DIAGNOSIS — J30.9 CHRONIC ALLERGIC RHINITIS, UNSPECIFIED SEASONALITY, UNSPECIFIED TRIGGER: ICD-10-CM

## 2017-10-10 PROCEDURE — 99606 MTMS BY PHARM EST 15 MIN: CPT | Performed by: PHARMACIST

## 2017-10-10 PROCEDURE — 99607 MTMS BY PHARM ADDL 15 MIN: CPT | Performed by: PHARMACIST

## 2017-10-10 RX ORDER — LIRAGLUTIDE 6 MG/ML
1.8 INJECTION SUBCUTANEOUS DAILY
Qty: 9 ML | Refills: 2 | Status: SHIPPED | OUTPATIENT
Start: 2017-10-10 | End: 2018-01-04

## 2017-10-10 RX ORDER — POLYETHYLENE GLYCOL 3350 17 G
POWDER IN PACKET (EA) ORAL
Qty: 100 LOZENGE | Refills: 2 | Status: SHIPPED | OUTPATIENT
Start: 2017-10-10 | End: 2017-11-14

## 2017-10-10 RX ORDER — NICOTINE 21 MG/24HR
1 PATCH, TRANSDERMAL 24 HOURS TRANSDERMAL EVERY 24 HOURS
Qty: 30 PATCH | Refills: 2 | Status: SHIPPED | OUTPATIENT
Start: 2017-10-10 | End: 2017-11-14

## 2017-10-10 RX ORDER — NICOTINE 21 MG/24HR
1 PATCH, TRANSDERMAL 24 HOURS TRANSDERMAL EVERY 24 HOURS
Qty: 30 PATCH | Refills: 2 | Status: SHIPPED | OUTPATIENT
Start: 2017-10-10 | End: 2017-10-10

## 2017-10-10 RX ORDER — GLIPIZIDE 5 MG/1
TABLET ORAL
Qty: 90 TABLET | Refills: 2 | Status: SHIPPED | OUTPATIENT
Start: 2017-10-10 | End: 2017-11-14

## 2017-10-10 NOTE — TELEPHONE ENCOUNTER
Lisinopril-hctz      Last Written Prescription Date: 04/18/2017  Last Fill Quantity: 90, # refills: 1  Last Office Visit with G, P or [facility] prescribing provider: 08/28/2017  Next 5 appointments (look out 90 days)     Nov 14, 2017 11:00 AM CST   SHORT with Katlin Rosado [facility] ([facility]    [address] [facility] [address] [phone]                   Potassium   Date Value Ref Range Status   05/24/2017 4.1 3.4 - 5.3 mmol/L Final     Creatinine   Date Value Ref Range Status   05/24/2017 0.54 0.52 - 1.04 mg/dL Final     BP Readings from Last 3 Encounters:   10/10/17 122/73   09/12/17 137/79   08/28/17 128/64     Kiet BULL (R)

## 2017-10-10 NOTE — PATIENT INSTRUCTIONS
Recommendations from today's MTM visit:                                                    Great job on the weight loss!!    Diabetes:  Increase Victoza to 1.8 mg once daily  Increase glipizide to 5mg in the AM and 10mg in the evening before meals.    Smoking cessation  Start nicotine patch 21mg once daily, take off at night  Start nicotine lozenges 1/2 of the 4mg as needed    Strategies for smoking triggers:  Keeping busy:  Cleaning house, Gym, Crafting, watching TV  Lemon/lime in water  Cinnamon sticks  Stress ball    Next MTM/pharmacist visit: 1 month    To schedule another MTM appointment, please call the clinic directly or you may call the MTM scheduling line at [phone] or toll-free at [phone].     My Clinical Pharmacist's contact information:                                                      It was a pleasure seeing you today!  Please feel free to contact me with any questions or concerns you have.      Katlin [facility]ado, [facility]D [facility]  Medication Therapy Management Practitioner   #[phone]    You may receive a survey about the MTM services you received.  I would appreciate your feedback to help me serve you better in the future. Please fill it out and return it when you can. Your comments will be anonymous.      My healthcare goals:                                                      Diabetes Goals:    Home Monitoring of Blood Sugars:Fasting  mg/dL and 2 hours after a meal less than 150 mg/dL.    Hemoglobin A1C: Less than 7%. Yours is   Lab Results   Component Value Date    A1C 8.0 08/28/2017   .    Blood Pressure: Less than 140/90mmHg. Yours is /73  Pulse 95  Wt (!) 346 lb 12.8 oz (157.3 kg)  BMI 54.32 kg/m2.    Cholesterol: You are taking a statin to help decrease the risk of heart disease.    Things you can do to help lower your blood sugars:    Diet: 3 meals and 1-2 snacks per day with 45-60 grams of carbohydrates per meal and 15-30 grams of carbohydrates per snack. Try to  fill your plate at least half-full with vegetables, fill one-quarter full with lean meats or protein, and also make sure you get at least some carbohydrate with every meal.    Exercise: 30 minutes per day of anything that will increase your heart rate and make you break a sweat! Gardening, walking, cleaning the house, changing the oil in your car, etc. If you feel like 30 minutes per day is too much, start small. Even lifting canned foods or working your arms with a resistance band in front of the TV can help.

## 2017-10-10 NOTE — MR AVS SNAPSHOT
After Visit Summary   10/10/2017    Yuki Boss    MRN: [id number]           Patient Information     Date Of Birth          1965        Visit Information        Provider Department      10/10/2017 11:00 AM Katlin Rosado, [facility] MTM        Today's Diagnoses     Vitamin D deficiency    -  1    Type 2 diabetes mellitus with hyperglycemia, without long-term current use of insulin (H)        Tobacco abuse          Care Instructions    Recommendations from today's MTM visit:                                                    Great job on the weight loss!!    Diabetes:  Increase Victoza to 1.8 mg once daily  Increase glipizide to 5mg in the AM and 10mg in the evening before meals.    Smoking cessation  Start nicotine patch 21mg once daily, take off at night  Start nicotine lozenges 1/2 of the 4mg as needed    Strategies for smoking triggers:  Keeping busy:  Cleaning house, Gym, Crafting, watching TV  Lemon/lime in water  Cinnamon sticks  Stress ball    Next MTM/pharmacist visit: 1 month    To schedule another MTM appointment, please call the clinic directly or you may call the MTM scheduling line at [phone] or toll-free at [phone].     My Clinical Pharmacist's contact information:                                                      It was a pleasure seeing you today!  Please feel free to contact me with any questions or concerns you have.      Katlin Rosado, PharmD [facility]  Medication Therapy Management Practitioner   #[phone]    You may receive a survey about the MTM services you received.  I would appreciate your feedback to help me serve you better in the future. Please fill it out and return it when you can. Your comments will be anonymous.      My healthcare goals:                                                      Diabetes Goals:    Home Monitoring of Blood Sugars:Fasting  mg/dL and 2 hours after a meal less than 150  mg/dL.    Hemoglobin A1C: Less than 7%. Yours is   Lab Results   Component Value Date    A1C 8.0 08/28/2017   .    Blood Pressure: Less than 140/90mmHg. Yours is /73  Pulse 95  Wt (!) 346 lb 12.8 oz (157.3 kg)  BMI 54.32 kg/m2.    Cholesterol: You are taking a statin to help decrease the risk of heart disease.    Things you can do to help lower your blood sugars:    Diet: 3 meals and 1-2 snacks per day with 45-60 grams of carbohydrates per meal and 15-30 grams of carbohydrates per snack. Try to fill your plate at least half-full with vegetables, fill one-quarter full with lean meats or protein, and also make sure you get at least some carbohydrate with every meal.    Exercise: 30 minutes per day of anything that will increase your heart rate and make you break a sweat! Gardening, walking, cleaning the house, changing the oil in your car, etc. If you feel like 30 minutes per day is too much, start small. Even lifting canned foods or working your arms with a resistance band in front of the TV can help.                Follow-ups after your visit        Your next 10 appointments already scheduled     Nov 14, 2017 11:00 AM CST   SHORT with Katlin Rosado BRIONNA   [facility] ([facility])    [address] [address] [address] [phone]              Future tests that were ordered for you today     Open Future Orders        Priority Expected Expires Ordered    Vitamin D Deficiency Routine  10/10/2018 10/10/2017            Who to contact     If you have questions or need follow up information about today's clinic visit or your schedule please contact [facility] directly at [phone].  Normal or non-critical lab and imaging results will be communicated to you by MyChart, letter or phone within 4 business days after the clinic has received the results. If you do not hear from us within 7 days, please contact the clinic through [employer] or  phone. If you have a critical or abnormal lab result, we will notify you by phone as soon as possible.  Submit refill requests through [employer] or call your pharmacy and they will forward the refill request to us. Please allow 3 business days for your refill to be completed.          Additional Information About Your Visit        [employer] Information     [employer] gives you secure access to your electronic health record. If you see a primary care provider, you can also send messages to your care team and make appointments. If you have questions, please call your primary care clinic.  If you do not have a primary care provider, please call [phone] and they will assist you.        Care EveryWhere ID     This is your Care EveryWhere ID. This could be used by other organizations to access your [facility] medical records  [id number]        Your Vitals Were     Pulse BMI (Body Mass Index)                95 54.32 kg/m2           Blood Pressure from Last 3 Encounters:   10/10/17 122/73   09/12/17 137/79   08/28/17 128/64    Weight from Last 3 Encounters:   10/10/17 (!) 346 lb 12.8 oz (157.3 kg)   09/12/17 (!) 357 lb 8 oz (162.2 kg)   08/28/17 (!) 359 lb 11.2 oz (163.2 kg)                 Today's Medication Changes          These changes are accurate as of: 10/10/17 12:05 PM.  If you have any questions, ask your nurse or doctor.               Start taking these medicines.        Dose/Directions    nicotine 21 MG/24HR 24 hr patch   Commonly known as:  NICODERM CQ   Used for:  Tobacco abuse        Dose:  1 patch   Place 1 patch onto the skin every 24 hours   Quantity:  30 patch   Refills:  2       nicotine polacrilex 2 MG lozenge   Commonly known as:  COMMIT   Used for:  Tobacco abuse        Dissolve 1 lozenge orally every 4 hours as directed.   Quantity:  100 lozenge   Refills:  2         These medicines have changed or have updated prescriptions.        Dose/Directions    glipiZIDE 5 MG tablet   Commonly known as:   GLUCOTROL   This may have changed:  additional instructions   Used for:  Type 2 diabetes mellitus with hyperglycemia, without long-term current use of insulin (H)        Take one tablet before breakfast and 2 tablets before dinner   Quantity:  90 tablet   Refills:  2       liraglutide 18 MG/3ML soln   Commonly known as:  VICTOZA PEN   This may have changed:  how much to take   Used for:  Type 2 diabetes mellitus with hyperglycemia, without long-term current use of insulin (H)        Dose:  1.8 mg   Inject 1.8 mg Subcutaneous daily Use 0.6mg once daily for 1 week and then increase to 1.2mg   Quantity:  9 mL   Refills:  2       metFORMIN 1000 MG tablet   Commonly known as:  GLUCOPHAGE   This may have changed:  See the new instructions.   Used for:  Type 2 diabetes mellitus with hyperglycemia, without long-term current use of insulin (H)        Dose:  1000 mg   Take 1 tablet (1,000 mg) by mouth 2 times daily (with meals)   Quantity:  180 tablet   Refills:  0            Where to get your medicines      These medications were sent to [facility]/pharmacy #[id number] - [facility] [address] [address]  [address] [address] [facility] [address]     Phone:  [phone]     glipiZIDE 5 MG tablet    liraglutide 18 MG/3ML soln    metFORMIN 1000 MG tablet    nicotine 21 MG/24HR 24 hr patch    nicotine polacrilex 2 MG lozenge                Primary Care Provider Office Phone # Fax #    Patricia Ephraim Chan, MICKEY [facility] [phone] [address] [facility] [address]        Equal Access to Services     [facility] AH: Hadii cherry ku hadvitaliyo Sogamaliel, waaxda luqadaha, qaybta kaalmada adeegyada, cynthia mendez. So [facility] [phone].    ATENCIÓN: Si habla español, tiene a rose disposición servicios gratuitos de asistencia lingüística. Llame al [phone].    We comply with applicable federal civil rights laws and [place] laws. We do not discriminate on the basis of race, color, national origin, age,  disability, sex, sexual orientation, or gender identity.            Thank you!     Thank you for choosing [facility]  for your care. Our goal is always to provide you with excellent care. Hearing back from our patients is one way we can continue to improve our services. Please take a few minutes to complete the written survey that you may receive in the mail after your visit with us. Thank you!             Your Updated Medication List - Protect others around you: Learn how to safely use, store and throw away your medicines at www.disposemymeds.org.          This list is accurate as of: 10/10/17 12:05 PM.  Always use your most recent med list.                   Brand Name Dispense Instructions for use Diagnosis    aspirin 81 MG tablet      Take by mouth daily        blood glucose lancets standard    no brand specified    3 Box    Use to test blood sugar once daily or as directed.    Type 2 diabetes mellitus with hyperglycemia, without long-term current use of insulin (H)       blood glucose monitoring test strip    no brand specified    100 strip    Use to test blood sugars one time daily or as directed    Type 2 diabetes mellitus with hyperglycemia, without long-term current use of insulin (H)       cetirizine 10 MG tablet    zyrTEC     Take 10 mg by mouth daily        cholecalciferol 5000 UNITS Caps capsule    vitamin D3     Take 5,000 Units by mouth daily        fluticasone 50 MCG/ACT spray    FLONASE    1 Bottle    Spray 1-2 sprays into both nostrils daily    Allergic rhinitis due to animal hair and dander, unspecified chronicity       glipiZIDE 5 MG tablet    GLUCOTROL    90 tablet    Take one tablet before breakfast and 2 tablets before dinner    Type 2 diabetes mellitus with hyperglycemia, without long-term current use of insulin (H)       insulin pen needle 31G X 5 MM     100 each    See Admin Instructions Use 1 pen needles daily or as directed.    Type 2 diabetes mellitus with  hyperglycemia, without long-term current use of insulin (H)       liraglutide 18 MG/3ML soln    VICTOZA PEN    9 mL    Inject 1.8 mg Subcutaneous daily Use 0.6mg once daily for 1 week and then increase to 1.2mg    Type 2 diabetes mellitus with hyperglycemia, without long-term current use of insulin (H)       lisinopril-hydrochlorothiazide 10-12.5 MG per tablet    PRINZIDE/ZESTORETIC    90 tablet    Take 1 tablet by mouth daily    Type 2 diabetes mellitus with hyperglycemia, without long-term current use of insulin (H)       loratadine 10 MG tablet    CLARITIN     Take 10 mg by mouth daily        metFORMIN 1000 MG tablet    GLUCOPHAGE    180 tablet    Take 1 tablet (1,000 mg) by mouth 2 times daily (with meals)    Type 2 diabetes mellitus with hyperglycemia, without long-term current use of insulin (H)       nicotine 21 MG/24HR 24 hr patch    NICODERM CQ    30 patch    Place 1 patch onto the skin every 24 hours    Tobacco abuse       nicotine polacrilex 2 MG lozenge    COMMIT    100 lozenge    Dissolve 1 lozenge orally every 4 hours as directed.    Tobacco abuse       simvastatin 20 MG tablet    ZOCOR    90 tablet    TAKE 1 TABLET BY MOUTH AT BEDTIME    Mixed hyperlipidemia       TYLENOL 8 HOUR 650 MG CR tablet   Generic drug:  acetaminophen      Take 650 mg by mouth every 8 hours as needed for mild pain or fever        venlafaxine 75 MG tablet    EFFEXOR    180 tablet    Take 1 tablet (75 mg) by mouth 2 times daily    Moderate episode of recurrent major depressive disorder (H)

## 2017-10-16 RX ORDER — LISINOPRIL/HYDROCHLOROTHIAZIDE 10-12.5 MG
TABLET ORAL
Qty: 90 TABLET | Refills: 1 | Status: SHIPPED | OUTPATIENT
Start: 2017-10-16 | End: 2018-03-15

## 2017-10-16 NOTE — TELEPHONE ENCOUNTER
Prescription approved per [facility] Refill Protocol.  ANITHA Camarillo RN

## 2017-11-06 ENCOUNTER — TELEPHONE (OUTPATIENT)
Dept: FAMILY MEDICINE | Facility: CLINIC | Age: 52
End: 2017-11-06

## 2017-11-06 NOTE — TELEPHONE ENCOUNTER
Panel Management Review      Patient has the following on her problem list:     Diabetes    ASA: Not Required     Last A1C  Lab Results   Component Value Date    A1C 8.0 08/28/2017    A1C 8.9 03/14/2017    A1C 8.5 10/11/2016    A1C 11.1 06/13/2016    A1C 8.2 03/21/2016     A1C tested: FAILED    Last LDL:    Lab Results   Component Value Date    CHOL 166 03/14/2017     Lab Results   Component Value Date    HDL 51 03/21/2016     Lab Results   Component Value Date    LDL 94 03/14/2017    LDL 65 03/21/2016     Lab Results   Component Value Date    TRIG 235 03/21/2016     Lab Results   Component Value Date    CHOLHDLRATIO 3.2 03/21/2016     No results found for: NHDL    Is the patient on a Statin? YES             Is the patient on Aspirin? YES    Medications     HMG CoA Reductase Inhibitors    simvastatin (ZOCOR) 20 MG tablet    Salicylates    aspirin 81 MG tablet          Last three blood pressure readings:  BP Readings from Last 3 Encounters:   10/10/17 122/73   09/12/17 137/79   08/28/17 128/64       Date of last diabetes office visit: 10/10/17     Tobacco History:     History   Smoking Status     Current Every Day Smoker     Packs/day: 1.00     Years: 10.00   Smokeless Tobacco     Never Used             Composite cancer screening  Chart review shows that this patient is due/due soon for the following Pap Smear, Mammogram and Fecal Colorectal (FIT)  Summary:    Patient is due/failing the following:   FIT, MAMMOGRAM, PAP and PHYSICAL    Action needed:   Patient needs referral/order: Physical and orders for Mammo, FIT    Type of outreach:    Sent HelpHive message.    Questions for provider review:    None                                                                                                                                    Cynthia CINTRON M.A.       Chart routed to Care Team .

## 2017-11-14 ENCOUNTER — OFFICE VISIT (OUTPATIENT)
Dept: PHARMACY | Facility: CLINIC | Age: 52
End: 2017-11-14
Payer: COMMERCIAL

## 2017-11-14 VITALS
WEIGHT: 293 LBS | SYSTOLIC BLOOD PRESSURE: 136 MMHG | HEART RATE: 97 BPM | DIASTOLIC BLOOD PRESSURE: 82 MMHG | BODY MASS INDEX: 52.59 KG/M2

## 2017-11-14 DIAGNOSIS — G35 MS (MULTIPLE SCLEROSIS) (H): ICD-10-CM

## 2017-11-14 DIAGNOSIS — E11.65 TYPE 2 DIABETES MELLITUS WITH HYPERGLYCEMIA, WITHOUT LONG-TERM CURRENT USE OF INSULIN (H): ICD-10-CM

## 2017-11-14 DIAGNOSIS — Z72.0 TOBACCO ABUSE: ICD-10-CM

## 2017-11-14 DIAGNOSIS — E66.01 MORBID OBESITY, UNSPECIFIED OBESITY TYPE (H): Primary | ICD-10-CM

## 2017-11-14 PROCEDURE — 99606 MTMS BY PHARM EST 15 MIN: CPT | Performed by: PHARMACIST

## 2017-11-14 PROCEDURE — 99607 MTMS BY PHARM ADDL 15 MIN: CPT | Performed by: PHARMACIST

## 2017-11-14 RX ORDER — GLIPIZIDE 5 MG/1
TABLET ORAL
Qty: 60 TABLET | Refills: 2
Start: 2017-11-14 | End: 2018-04-10

## 2017-11-14 RX ORDER — GLATIRAMER 40 MG/ML
40 INJECTION, SOLUTION SUBCUTANEOUS
COMMUNITY
End: 2017-11-28

## 2017-11-14 NOTE — PROGRESS NOTES
SUBJECTIVE/OBJECTIVE:                Yuki Boss is a 52 year old female coming in for a follow-up visit for Medication Therapy Management.  She was referred to me from Dr. Skaggs.       Chief Complaint: Follow up from our visit on 10/10/2017.  Victoza gets to 0.6 when pen runs out need to use and go to next pen  Victoza cost was $88 per month doubled in price  She has an insurance letter that reports Levemir, Tresiba and Basaglar are insulins that are covered for her next year    Tobacco: 0-1 pack per day - is interested in quittingTobacco Cessation Action Plan: Self help information given to patient  She will contact Quit Plan to see if can get coverage for the nicotine patches and gum  Alcohol: not currently using    Medication Adherence: no issues reported; she is getting help with paying for her Copaxone    Diabetes:  Pt currently taking Victoza 1.8 mg QD, metformin 1000 mg PO BID, glipizide IR 5 mg PO and 10mg at night.   Pt reports some side effects of nausea.  Not eating as much. Pt was previously on pioglitazone before starting the Victoza. She was taken off the pioglitazone because of lower leg edema and weight gain.   SMBG: one time daily or less. See below for values:     Symptoms of low blood sugar? shaky, dizzy.   Frequency of hypoglycemia? Every day before lunch if she does not eat lunch  Recent symptoms of high blood sugar? none  Exercise: Limited. Walks some. Plans to start pool therapy at [facility] or other fitness center in [place] where one of her friends goes.   Diet:  She feels she is nauseated and does not have an appetite, okay with this since she is losing weight    Obesity: Taking liraglutide (Victoza). She has noticed that she cannot eat as much since starting this medication. Her multiple sclerosis makes exercising and getting around difficult.     Wt Readings from Last 4 Encounters:   11/14/17 (!) 335 lb 12.8 oz (152.3 kg)   10/10/17 (!) 346 lb 12.8 oz (157.3 kg)   09/12/17 (!) 357  lb 8 oz (162.2 kg)   08/28/17 (!) 359 lb 11.2 oz (163.2 kg)     Smoking cessation:   Pt smokes hand-rolled cigarettes with pipe tobacco. Continues to smoke 1 pack per day.  She is thinking of contacting Quit Plan for support/help with covering NRT since insurance is not covering this.  After the holiday she hopes to set smoking cessation quit date    Multiple sclerosis: Copaxone given 40mg three times a week  S/E: Injection site reaction.  Diagnosed in the past year. Seeing a neurologist. Just saw neurologist in early October.   Neurology is working on getting insurance coverage still, so patient has yet to start this medication. Working on diet and exercise at this time.     Current labs include:BP Readings from Last 3 Encounters:   10/10/17 122/73   09/12/17 137/79   08/28/17 128/64     Today's Vitals: /82  Pulse 97  Wt (!) 335 lb 12.8 oz (152.3 kg)  BMI 52.59 kg/m2  Lab Results   Component Value Date    A1C 8.0 08/28/2017   .  Lab Results   Component Value Date    CHOL 166 03/14/2017     Lab Results   Component Value Date    TRIG 235 03/21/2016     Lab Results   Component Value Date    HDL 51 03/21/2016     Lab Results   Component Value Date    LDL 94 03/14/2017    LDL 65 03/21/2016       Liver Function Studies -   Recent Labs   Lab Test  05/24/17   1207   PROTTOTAL  7.2   ALBUMIN  3.5   BILITOTAL  0.3   ALKPHOS  76   AST  14   ALT  21       Lab Results   Component Value Date    UCRR 157 08/28/2017    MICROL 12 08/28/2017    UMALCR 7.58 08/28/2017       Last Basic Metabolic Panel:  Lab Results   Component Value Date     05/24/2017      Lab Results   Component Value Date    POTASSIUM 4.1 05/24/2017     Lab Results   Component Value Date    CHLORIDE 105 05/24/2017     Lab Results   Component Value Date    BUN 11 05/24/2017     Lab Results   Component Value Date    CR 0.54 05/24/2017     GFR Estimate   Date Value Ref Range Status   05/24/2017 >90  Non  GFR Calc   >60 mL/min/1.7m2 Final    03/14/2017 >90  Non  GFR Calc   >60 mL/min/1.7m2 Final   03/21/2016 >60 >60 ml/min/1.73m2 Final     GFR Estimate If Black   Date Value Ref Range Status   05/24/2017 >90   GFR Calc   >60 mL/min/1.7m2 Final   03/14/2017 >90   GFR Calc   >60 mL/min/1.7m2 Final   03/21/2016 >60 >60 ml/min/1.73m2 Final     TSH   Date Value Ref Range Status   05/24/2017 0.85 0.40 - 4.00 mU/L Final   ]    Most Recent Immunizations   Administered Date(s) Administered     Influenza (H1N1) 12/17/2009     Influenza (IIV3) 12/17/2009     Influenza Vaccine IM 3yrs+ 4 Valent IIV4 08/31/2017     Pneumococcal 23 valent 09/03/2010     TDAP Vaccine (Adacel) 07/17/2009     Tetanus 10/02/1967       ASSESSMENT:              Current medications were reviewed today as discussed above.      Medication Adherence: no issues identified    Diabetes/Obesity: Needs Improvement. Patient is not meeting A1c goal of < 7%. Self monitoring of blood glucose is not at goal of fasting  mg/dL. Pt would benefit from Basal Insulin (Tresiba) :  Start at dose : 10 units; discussed combination pens with GLP-1 and basal insulin but looks to not be covered through EMR, pt may need to call insurance.  Educated on storage, administration and treatment of hypoglycemia with pt, handout given  SFU (glipizide) :  decrease dose to 10mg at dinner.  Patient had 9 lb weight loss!    Smoking cessation: Needs Improvement. Pt continues to use tobacco. Pt is not ready to quit using tobacco.  We discussed: ways to prepare for a quit date.    Multiple sclerosis: Unimproved.  Pt would benefit from continuing to work with neurologist, appointment set up for follow-up pt reports.     PLAN:                  Diabetes:    Change glipizide to 10mg at dinner  Start Tresiba 10 units at bedtime and increase by 2 units every 3 days for morning blood sugars >130 mg/dL  Continue Victoza 1.8mg once daily    Smoking cessation:  Set quit date after New  Year    Future visit:  Give Xultrophy (liraglutide and insulin degludec combination in 1 pen) for pt to see if insurance covers    Next MTM/pharmacist visit: 2 weeks via telephone and Patricia Chan in December    I spent 47 minutes with this patient today. All changes were made via collaborative practice agreement with Patricia Chan Ra. A copy of the visit note was provided to the patient's primary care provider.     The patient was given a summary of these recommendations as an after visit summary.    Katlin Rosado, PharmD [facility]  Medication Therapy Management Practitioner   #[phone]

## 2017-11-14 NOTE — MR AVS SNAPSHOT
After Visit Summary   11/14/2017    Yuki Boss    MRN: [id number]           Patient Information     Date Of Birth          1965        Visit Information        Provider Department      11/14/2017 11:00 AM Katlin Rosado, [facility] MTM        Today's Diagnoses     Type 2 diabetes mellitus with hyperglycemia, without long-term current use of insulin (H)          Care Instructions    Recommendations from today's MTM visit:                                                      Diabetes:    Change glipizide to 10mg at dinner  Start Tresiba 10 units at bedtime and increase by 2 units every 3 days for morning blood sugars >130 mg/dL  Continue Victoza    Next MTM/pharmacist visit: 2 weeks via telephone and Patricia Chan in December    To schedule another MTM appointment, please call the clinic directly or you may call the MTM scheduling line at [phone] or toll-free at [phone].     My Clinical Pharmacist's contact information:                                                      It was a pleasure seeing you today!  Please feel free to contact me with any questions or concerns you have.      Katlin Rosado, PharmD [facility]  Medication Therapy Management Practitioner   #[phone]    You may receive a survey about the MTM services you received.  I would appreciate your feedback to help me serve you better in the future. Please fill it out and return it when you can. Your comments will be anonymous.      My healthcare goals:                                                              Hypoglycemia (Low Blood Sugar)     Fast-acting sugar includes a cup of nonfat milk.     Too little sugar (glucose) in your blood is called hypoglycemia or low blood sugar. Low blood sugar usually means anything lower than 70 mg/dL. Talk with your healthcare provider about your target range and what level is too low for you. Diabetes itself doesn t cause low blood sugar. But some of  the treatments for diabetes, such as pills or insulin, may raise your risk for it. Low blood sugar may cause you to pass out or have a seizure. So always treat low blood sugar right away, but don't overeat.  Special note: Always carry a source of fast-acting sugar and a snack in case of hypoglycemia.   What you may notice  If you have low blood sugar, you may have one or more of these symptoms:    Shakiness or dizziness    Cold, clammy skin or sweating    Feelings of hunger    Headache    Nervousness    A hard, fast heartbeat    Weakness    Confusion or irritability    Blurred vision    Having nightmares or waking up confused or sweating    Numbness or tingling in the lips or tongue  What you should do  Here are tips to follow if you have hypoglycemia:     First check your blood sugar. If it is too low (out of your target range), eat or drink 15 to 20 grams of fast-acting sugar. This may be 3 to 4 glucose tablets, 4 ounces (half a cup) of fruit juice or regular (nondiet) soda, 8 ounces (1 cup) of fat-free milk, or 1 tablespoon of honey. Don t take more than this, or your blood sugar may go too high.    Wait 15 minutes. Then recheck your blood sugar if you can.    If your blood sugar is still too low, repeat the steps above and check your blood sugar again. If your blood sugar still has not returned to your target range, contact your healthcare provider or seek emergency care.    Once your blood sugar returns to target range, eat a snack or meal.  Preventing low blood sugar  Things you can do include the following:     If your condition needs a strict treatment plan, eat your meals and snacks at the same times each day. Don t skip meals!    If your treatment plan lets you change when you eat and what you eat, learn how to change the time and dose of your rapid-acting insulin to match this.     Ask your healthcare provider if it is safe for you to drink alcohol. Never drink on an empty stomach.    Take your medicine  at the prescribed times.    Always carry a source of fast-acting sugar and a snack when you re away from home.  Other things to do  Additional tips include the following:    Carry a medical ID card, a compact USB drive, or wear a medical alert bracelet or necklace. It should say that you have diabetes. It should also say what to do if you pass out or have a seizure.    Make sure your family, friends, and coworkers know the signs of low blood sugar. Tell them what to do if your blood sugar falls very low and you can t treat yourself.    Keep a glucagon emergency kit handy. Be sure your family, friends, and coworkers know how and when to use it. Check it regularly and replace the glucagon before it expires.    Talk with your health care team about other things you can do to prevent low blood sugar.     If you have unexplained hypoglycemia or hypoglycemia several times, call your healthcare provider.   Date Last Reviewed: 5/1/2016 2000-2017 [employer]. [address] [place] [address]. All rights reserved. This information is not intended as a substitute for professional medical care. Always follow your healthcare professional's instructions.                Follow-ups after your visit        Your next 10 appointments already scheduled     Nov 28, 2017  9:00 AM CST   TELEMEDICINE with Katlin Rosado BRIONNA   [facility] ([facility])    [address] [address] [facility] [address] [phone]           Note: this is not an onsite visit; there is no need to come to the facility.              Who to contact     If you have questions or need follow up information about today's clinic visit or your schedule please contact [facility] directly at [phone].  Normal or non-critical lab and imaging results will be communicated to you by MyChart, letter or phone within 4 business days after the clinic has received the results. If you do  not hear from us within 7 days, please contact the clinic through [employer] or phone. If you have a critical or abnormal lab result, we will notify you by phone as soon as possible.  Submit refill requests through [employer] or call your pharmacy and they will forward the refill request to us. Please allow 3 business days for your refill to be completed.          Additional Information About Your Visit        [employer] Information     [employer] gives you secure access to your electronic health record. If you see a primary care provider, you can also send messages to your care team and make appointments. If you have questions, please call your primary care clinic.  If you do not have a primary care provider, please call [phone] and they will assist you.        Care EveryWhere ID     This is your Care EveryWhere ID. This could be used by other organizations to access your [facility] medical records  [id number]        Your Vitals Were     Pulse BMI (Body Mass Index)                97 52.59 kg/m2           Blood Pressure from Last 3 Encounters:   11/14/17 136/82   10/10/17 122/73   09/12/17 137/79    Weight from Last 3 Encounters:   11/14/17 (!) 335 lb 12.8 oz (152.3 kg)   10/10/17 (!) 346 lb 12.8 oz (157.3 kg)   09/12/17 (!) 357 lb 8 oz (162.2 kg)              Today, you had the following     No orders found for display         Today's Medication Changes          These changes are accurate as of: 11/14/17 11:39 AM.  If you have any questions, ask your nurse or doctor.               Start taking these medicines.        Dose/Directions    insulin degludec 100 UNIT/ML pen   Commonly known as:  TRESIBA   Used for:  Type 2 diabetes mellitus with hyperglycemia, without long-term current use of insulin (H)        Dose:  10 Units   Inject 10 Units Subcutaneous daily Increase by 2 units every 3 days for AM BS>130; max 30 units   Quantity:  15 mL   Refills:  1         These medicines have changed or have updated prescriptions.         Dose/Directions    glipiZIDE 5 MG tablet   Commonly known as:  GLUCOTROL   This may have changed:  additional instructions   Used for:  Type 2 diabetes mellitus with hyperglycemia, without long-term current use of insulin (H)        Take  2 tablets before dinner   Quantity:  60 tablet   Refills:  2       insulin pen needle 31G X 5 MM   This may have changed:  additional instructions   Used for:  Type 2 diabetes mellitus with hyperglycemia, without long-term current use of insulin (H)        See Admin Instructions Use 2 pen needles daily or as directed.   Quantity:  100 each   Refills:  3            Where to get your medicines      These medications were sent to [id number] - [place], [place] - [address]  [address] [place], [facility] [address]     Phone:  [phone]     insulin degludec 100 UNIT/ML pen    insulin pen needle 31G X 5 MM         Some of these will need a paper prescription and others can be bought over the counter.  Ask your nurse if you have questions.     You don't need a prescription for these medications     glipiZIDE 5 MG tablet                Primary Care Provider Office Phone # Fax #    Patricia MICKEY [facility] [phone]       [address] [facility] [address]        Equal Access to Services     NELSY SHORT AH: Hadii aad ku hadasho Soomaali, waaxda luqadaha, qaybta kaalmada adeegyada, waxay idiin hayaan salinas mendez. So [facility] [phone].    ATENCIÓN: Si habla español, tiene a rose disposición servicios gratuitos de asistencia lingüística. Wenceslaoame al [phone].    We comply with applicable federal civil rights laws and [place] laws. We do not discriminate on the basis of race, color, national origin, age, disability, sex, sexual orientation, or gender identity.            Thank you!     Thank you for choosing [facility]  for your care. Our goal is always to provide you with excellent care. Hearing back from our patients is  one way we can continue to improve our services. Please take a few minutes to complete the written survey that you may receive in the mail after your visit with us. Thank you!             Your Updated Medication List - Protect others around you: Learn how to safely use, store and throw away your medicines at www.disposemymeds.org.          This list is accurate as of: 11/14/17 11:39 AM.  Always use your most recent med list.                   Brand Name Dispense Instructions for use Diagnosis    aspirin 81 MG tablet      Take by mouth daily        blood glucose lancets standard    no brand specified    3 Box    Use to test blood sugar once daily or as directed.    Type 2 diabetes mellitus with hyperglycemia, without long-term current use of insulin (H)       blood glucose monitoring test strip    no brand specified    100 strip    Use to test blood sugars one time daily or as directed    Type 2 diabetes mellitus with hyperglycemia, without long-term current use of insulin (H)       cetirizine 10 MG tablet    zyrTEC     Take 10 mg by mouth daily        cholecalciferol 5000 UNITS Caps capsule    vitamin D3     Take 5,000 Units by mouth daily        COPAXONE 40 MG/ML Sosy   Generic drug:  Glatiramer Acetate      Inject 40 mg Subcutaneous three times a week        fluticasone 50 MCG/ACT spray    FLONASE    1 Bottle    Spray 1-2 sprays into both nostrils daily    Allergic rhinitis due to animal hair and dander, unspecified chronicity       glipiZIDE 5 MG tablet    GLUCOTROL    60 tablet    Take  2 tablets before dinner    Type 2 diabetes mellitus with hyperglycemia, without long-term current use of insulin (H)       insulin degludec 100 UNIT/ML pen    TRESIBA    15 mL    Inject 10 Units Subcutaneous daily Increase by 2 units every 3 days for AM BS>130; max 30 units    Type 2 diabetes mellitus with hyperglycemia, without long-term current use of insulin (H)       insulin pen needle 31G X 5 MM     100 each    See Admin  Instructions Use 2 pen needles daily or as directed.    Type 2 diabetes mellitus with hyperglycemia, without long-term current use of insulin (H)       liraglutide 18 MG/3ML soln    VICTOZA PEN    9 mL    Inject 1.8 mg Subcutaneous daily Use 0.6mg once daily for 1 week and then increase to 1.2mg    Type 2 diabetes mellitus with hyperglycemia, without long-term current use of insulin (H)       lisinopril-hydrochlorothiazide 10-12.5 MG per tablet    PRINZIDE/ZESTORETIC    90 tablet    TAKE 1 TABLET BY MOUTH DAILY    Type 2 diabetes mellitus with hyperglycemia, without long-term current use of insulin (H)       metFORMIN 1000 MG tablet    GLUCOPHAGE    180 tablet    Take 1 tablet (1,000 mg) by mouth 2 times daily (with meals)    Type 2 diabetes mellitus with hyperglycemia, without long-term current use of insulin (H)       simvastatin 20 MG tablet    ZOCOR    90 tablet    TAKE 1 TABLET BY MOUTH AT BEDTIME    Mixed hyperlipidemia       TYLENOL 8 HOUR 650 MG CR tablet   Generic drug:  acetaminophen      Take 650 mg by mouth every 8 hours as needed for mild pain or fever        venlafaxine 75 MG tablet    EFFEXOR    180 tablet    Take 1 tablet (75 mg) by mouth 2 times daily    Moderate episode of recurrent major depressive disorder (H)

## 2017-11-14 NOTE — PATIENT INSTRUCTIONS
Recommendations from today's MTM visit:                                                      Diabetes:    Change glipizide to 10mg at dinner  Start Tresiba 10 units at bedtime and increase by 2 units every 3 days for morning blood sugars >130 mg/dL  Continue Victoza    Next MTM/pharmacist visit: 2 weeks via telephone and Patricia Chan in December    To schedule another MTM appointment, please call the clinic directly or you may call the MTM scheduling line at [phone] or toll-free at [phone].     My Clinical Pharmacist's contact information:                                                      It was a pleasure seeing you today!  Please feel free to contact me with any questions or concerns you have.      Katlin Rosado, PharmD [facility]  Medication Therapy Management Practitioner   #[phone]    You may receive a survey about the MTM services you received.  I would appreciate your feedback to help me serve you better in the future. Please fill it out and return it when you can. Your comments will be anonymous.      My healthcare goals:                                                              Hypoglycemia (Low Blood Sugar)     Fast-acting sugar includes a cup of nonfat milk.     Too little sugar (glucose) in your blood is called hypoglycemia or low blood sugar. Low blood sugar usually means anything lower than 70 mg/dL. Talk with your healthcare provider about your target range and what level is too low for you. Diabetes itself doesn t cause low blood sugar. But some of the treatments for diabetes, such as pills or insulin, may raise your risk for it. Low blood sugar may cause you to pass out or have a seizure. So always treat low blood sugar right away, but don't overeat.  Special note: Always carry a source of fast-acting sugar and a snack in case of hypoglycemia.   What you may notice  If you have low blood sugar, you may have one or more of these symptoms:    Shakiness or dizziness    Cold,  clammy skin or sweating    Feelings of hunger    Headache    Nervousness    A hard, fast heartbeat    Weakness    Confusion or irritability    Blurred vision    Having nightmares or waking up confused or sweating    Numbness or tingling in the lips or tongue  What you should do  Here are tips to follow if you have hypoglycemia:     First check your blood sugar. If it is too low (out of your target range), eat or drink 15 to 20 grams of fast-acting sugar. This may be 3 to 4 glucose tablets, 4 ounces (half a cup) of fruit juice or regular (nondiet) soda, 8 ounces (1 cup) of fat-free milk, or 1 tablespoon of honey. Don t take more than this, or your blood sugar may go too high.    Wait 15 minutes. Then recheck your blood sugar if you can.    If your blood sugar is still too low, repeat the steps above and check your blood sugar again. If your blood sugar still has not returned to your target range, contact your healthcare provider or seek emergency care.    Once your blood sugar returns to target range, eat a snack or meal.  Preventing low blood sugar  Things you can do include the following:     If your condition needs a strict treatment plan, eat your meals and snacks at the same times each day. Don t skip meals!    If your treatment plan lets you change when you eat and what you eat, learn how to change the time and dose of your rapid-acting insulin to match this.     Ask your healthcare provider if it is safe for you to drink alcohol. Never drink on an empty stomach.    Take your medicine at the prescribed times.    Always carry a source of fast-acting sugar and a snack when you re away from home.  Other things to do  Additional tips include the following:    Carry a medical ID card, a compact USB drive, or wear a medical alert bracelet or necklace. It should say that you have diabetes. It should also say what to do if you pass out or have a seizure.    Make sure your family, friends, and coworkers know the signs  of low blood sugar. Tell them what to do if your blood sugar falls very low and you can t treat yourself.    Keep a glucagon emergency kit handy. Be sure your family, friends, and coworkers know how and when to use it. Check it regularly and replace the glucagon before it expires.    Talk with your health care team about other things you can do to prevent low blood sugar.     If you have unexplained hypoglycemia or hypoglycemia several times, call your healthcare provider.   Date Last Reviewed: 5/1/2016 2000-2017 [employer]. [address] [place] [address], [place] [address]. All rights reserved. This information is not intended as a substitute for professional medical care. Always follow your healthcare professional's instructions.

## 2017-11-28 ENCOUNTER — ALLIED HEALTH/NURSE VISIT (OUTPATIENT)
Dept: PHARMACY | Facility: CLINIC | Age: 52
End: 2017-11-28
Payer: COMMERCIAL

## 2017-11-28 DIAGNOSIS — E11.65 TYPE 2 DIABETES MELLITUS WITH HYPERGLYCEMIA, WITHOUT LONG-TERM CURRENT USE OF INSULIN (H): ICD-10-CM

## 2017-11-28 DIAGNOSIS — G35 MS (MULTIPLE SCLEROSIS) (H): ICD-10-CM

## 2017-11-28 DIAGNOSIS — Z72.0 TOBACCO ABUSE: Primary | ICD-10-CM

## 2017-11-28 PROCEDURE — 99606 MTMS BY PHARM EST 15 MIN: CPT | Performed by: PHARMACIST

## 2017-11-28 NOTE — MR AVS SNAPSHOT
After Visit Summary   11/28/2017    Yuki Boss    MRN: [id number]           Patient Information     Date Of Birth          1965        Visit Information        Provider Department      11/28/2017 9:00 AM Katlin Rosado, [facility] MTM        Today's Diagnoses     Tobacco abuse    -  1    Type 2 diabetes mellitus with hyperglycemia, without long-term current use of insulin (H)        MS (multiple sclerosis) (H)          Care Instructions    Recommendations from today's MTM visit:                                                      Diabetes:    Increase Tresiba 20 units once daily, continue to increase by 2 units every 3 days for morning blood sugars >130 mg/dL.  HA1C to be done at Patricia Chan CNP visit on 12/4    New Year Goals, to discuss future plan at next visit:  1)  Exercise  2)  Smoking cessation    MS:  Katlin to ask provider about new referral to different neurologist.    Next MTM/pharmacist visit: January 9th    To schedule another MTM appointment, please call the clinic directly or you may call the MTM scheduling line at [phone] or toll-free at [phone].     My Clinical Pharmacist's contact information:                                                      It was a pleasure seeing you today!  Please feel free to contact me with any questions or concerns you have.      Katlin Rosado, PharmD [facility]  Medication Therapy Management Practitioner   #[phone]    You may receive a survey about the MTM services you received.  I would appreciate your feedback to help me serve you better in the future. Please fill it out and return it when you can. Your comments will be anonymous.                  Follow-ups after your visit        Your next 10 appointments already scheduled     Dec 04, 2017 10:00 AM BALDEMAR FRAZIER with MICKEY Aguiar Ra [facility] ([facility])    [address]  [address] [phone]            Jan 09, 2018 10:30 AM CST   Office Visit with Katlin Rosado [facility] ([facility])    [address] [address] [phone]           Bring a current list of meds and any records pertaining to this visit. For Physicals, please bring immunization records and any forms needing to be filled out. Please arrive 10 minutes early to complete paperwork.              Who to contact     If you have questions or need follow up information about today's clinic visit or your schedule please contact [facility] directly at [phone].  Normal or non-critical lab and imaging results will be communicated to you by [employer], letter or phone within 4 business days after the clinic has received the results. If you do not hear from us within 7 days, please contact the clinic through [employer] or phone. If you have a critical or abnormal lab result, we will notify you by phone as soon as possible.  Submit refill requests through [employer] or call your pharmacy and they will forward the refill request to us. Please allow 3 business days for your refill to be completed.          Additional Information About Your Visit        MyCharTextingly Information     [employer] gives you secure access to your electronic health record. If you see a primary care provider, you can also send messages to your care team and make appointments. If you have questions, please call your primary care clinic.  If you do not have a primary care provider, please call [phone] and they will assist you.        Care EveryWhere ID     This is your Care EveryWhere ID. This could be used by other organizations to access your [facility] medical records  [id number]         Blood Pressure from Last 3 Encounters:   11/14/17 136/82   10/10/17 122/73   09/12/17 137/79    Weight from Last 3 Encounters:   11/14/17 (!) 335 lb 12.8 oz (152.3 kg)   10/10/17 (!)  346 lb 12.8 oz (157.3 kg)   09/12/17 (!) 357 lb 8 oz (162.2 kg)              Today, you had the following     No orders found for display         Today's Medication Changes          These changes are accurate as of: 11/28/17  9:34 AM.  If you have any questions, ask your nurse or doctor.               These medicines have changed or have updated prescriptions.        Dose/Directions    insulin degludec 100 UNIT/ML pen   Commonly known as:  TRESIBA   This may have changed:  how much to take   Used for:  Type 2 diabetes mellitus with hyperglycemia, without long-term current use of insulin (H)   Changed by:  Katlin Rosado RPH        Dose:  20 Units   Inject 20 Units Subcutaneous daily Increase by 2 units every 3 days for AM BS>130; max 30 units   Quantity:  15 mL   Refills:  1         Stop taking these medicines if you haven't already. Please contact your care team if you have questions.     COPAXONE 40 MG/ML Sosy   Generic drug:  Glatiramer Acetate   Stopped by:  Katlin Rosado RPH                Where to get your medicines      Some of these will need a paper prescription and others can be bought over the counter.  Ask your nurse if you have questions.     You don't need a prescription for these medications     insulin degludec 100 UNIT/ML pen                Primary Care Provider Office Phone # Fax #    Patricia MICKEY [facility] [phone] [address] [address] [facility] [address]        Equal Access to Services     [facility] Center[facility] AH: Hadii aad ku hadasho Soomaali, waaxda luqadaha, qaybta kaalmada adeegyada, cynthia dozier haytodd gilliland . So [facility] [phone].    ATENCIÓN: Si habla español, tiene a rose disposición servicios gratuitos de asistencia lingüística. Llame al [phone].    We comply with applicable federal civil rights laws and [place] laws. We do not discriminate on the basis of race, color, national origin, age, disability, sex, sexual orientation, or  gender identity.            Thank you!     Thank you for choosing [facility] [place]  for your care. Our goal is always to provide you with excellent care. Hearing back from our patients is one way we can continue to improve our services. Please take a few minutes to complete the written survey that you may receive in the mail after your visit with us. Thank you!             Your Updated Medication List - Protect others around you: Learn how to safely use, store and throw away your medicines at www.disposemymeds.org.          This list is accurate as of: 11/28/17  9:34 AM.  Always use your most recent med list.                   Brand Name Dispense Instructions for use Diagnosis    aspirin 81 MG tablet      Take by mouth daily        blood glucose lancets standard    no brand specified    3 Box    Use to test blood sugar once daily or as directed.    Type 2 diabetes mellitus with hyperglycemia, without long-term current use of insulin (H)       blood glucose monitoring test strip    no brand specified    100 strip    Use to test blood sugars one time daily or as directed    Type 2 diabetes mellitus with hyperglycemia, without long-term current use of insulin (H)       cetirizine 10 MG tablet    zyrTEC     Take 10 mg by mouth daily        cholecalciferol 5000 UNITS Caps capsule    vitamin D3     Take 5,000 Units by mouth daily        fluticasone 50 MCG/ACT spray    FLONASE    1 Bottle    Spray 1-2 sprays into both nostrils daily    Allergic rhinitis due to animal hair and dander, unspecified chronicity       glipiZIDE 5 MG tablet    GLUCOTROL    60 tablet    Take  2 tablets before dinner    Type 2 diabetes mellitus with hyperglycemia, without long-term current use of insulin (H)       insulin degludec 100 UNIT/ML pen    TRESIBA    15 mL    Inject 20 Units Subcutaneous daily Increase by 2 units every 3 days for AM BS>130; max 30 units    Type 2 diabetes mellitus with hyperglycemia, without long-term current  use of insulin (H)       insulin pen needle 31G X 5 MM     100 each    See Admin Instructions Use 2 pen needles daily or as directed.    Type 2 diabetes mellitus with hyperglycemia, without long-term current use of insulin (H)       liraglutide 18 MG/3ML soln    VICTOZA PEN    9 mL    Inject 1.8 mg Subcutaneous daily Use 0.6mg once daily for 1 week and then increase to 1.2mg    Type 2 diabetes mellitus with hyperglycemia, without long-term current use of insulin (H)       lisinopril-hydrochlorothiazide 10-12.5 MG per tablet    PRINZIDE/ZESTORETIC    90 tablet    TAKE 1 TABLET BY MOUTH DAILY    Type 2 diabetes mellitus with hyperglycemia, without long-term current use of insulin (H)       metFORMIN 1000 MG tablet    GLUCOPHAGE    180 tablet    Take 1 tablet (1,000 mg) by mouth 2 times daily (with meals)    Type 2 diabetes mellitus with hyperglycemia, without long-term current use of insulin (H)       simvastatin 20 MG tablet    ZOCOR    90 tablet    TAKE 1 TABLET BY MOUTH AT BEDTIME    Mixed hyperlipidemia       TYLENOL 8 HOUR 650 MG CR tablet   Generic drug:  acetaminophen      Take 650 mg by mouth every 8 hours as needed for mild pain or fever        venlafaxine 75 MG tablet    EFFEXOR    180 tablet    Take 1 tablet (75 mg) by mouth 2 times daily    Moderate episode of recurrent major depressive disorder (H)

## 2017-11-28 NOTE — PATIENT INSTRUCTIONS
Recommendations from today's MTM visit:                                                      Diabetes:    Increase Tresiba 20 units once daily, continue to increase by 2 units every 3 days for morning blood sugars >130 mg/dL.  HA1C to be done at Patricia Chan CNP visit on 12/4    New Year Goals, to discuss future plan at next visit:  1)  Exercise  2)  Smoking cessation    MS:  Katlin to ask provider about new referral to different neurologist.    Next MTM/pharmacist visit: January 9th    To schedule another MTM appointment, please call the clinic directly or you may call the MTM scheduling line at [phone] or toll-free at [phone].     My Clinical Pharmacist's contact information:                                                      It was a pleasure seeing you today!  Please feel free to contact me with any questions or concerns you have.      Katlin Rosado, PharmD [facility]  Medication Therapy Management Practitioner   #[phone]    You may receive a survey about the MTM services you received.  I would appreciate your feedback to help me serve you better in the future. Please fill it out and return it when you can. Your comments will be anonymous.

## 2017-11-28 NOTE — PROGRESS NOTES
SUBJECTIVE/OBJECTIVE:                Yuki Boss is a 52 year old female coming in for a follow-up visit for Medication Therapy Management.  She was referred to me from Patricia Chan NP.       Chief Complaint: Follow up from our visit on 11/14/2017.  Diabetes, she has not seen her blood sugars decrease yet with the Tresiba    Tobacco: 0-1 pack per day - is not interested in quittingTobacco Cessation Action Plan: Information offered: Patient not interested at this time  Alcohol: not currently using    Medication Adherence: no issues reported    Diabetes:  Pt currently taking Victoza 1.8 mg QD, metformin 1000 mg PO BID, glipizide 10mg at night, Tresiba 16 units once daily (increased from yesterday).   Pt reports no side effects.  Pt was previously on pioglitazone before starting the Victoza. She was taken off the pioglitazone because of lower leg edema and weight gain.   SMBG: one time daily or less. 161-181  Date FBG/ 2hours post Lunch/2hours post Dinner /2hours post Bedtime   11/27 181      11/26 187      11/25 161      11/22  /122 After eating something 255    11/20 180  135    11/18 159   147, 162   11/17 169   279     Symptoms of low blood sugar? shaky, dizzy.   Frequency of hypoglycemia? None; Every day before lunch if she does not eat lunch  Recent symptoms of high blood sugar? none  Exercise: Limited. Walks some. Plans to start pool therapy at [facility] or other fitness center in [place] after the holidays.    Smoking cessation:   Pt smokes hand-rolled cigarettes with pipe tobacco. Continues to smoke 1 pack per day.    After the holiday she hopes to set smoking cessation quit date    Multiple sclerosis: She stopped the Copaxone given 40mg three times a week due to injection site reaction.  She would like to find a different neurologist.   She was at [facility], Dr. Jesus Christianson.    Current labs include:  BP Readings from Last 3 Encounters:   11/14/17 136/82   10/10/17 122/73   09/12/17 137/79     Today's  Vitals: There were no vitals taken for this visit. telemed  Lab Results   Component Value Date    A1C 8.0 08/28/2017   .  Lab Results   Component Value Date    CHOL 166 03/14/2017     Lab Results   Component Value Date    TRIG 235 03/21/2016     Lab Results   Component Value Date    HDL 51 03/21/2016     Lab Results   Component Value Date    LDL 94 03/14/2017    LDL 65 03/21/2016       Liver Function Studies -   Recent Labs   Lab Test  05/24/17   1207   PROTTOTAL  7.2   ALBUMIN  3.5   BILITOTAL  0.3   ALKPHOS  76   AST  14   ALT  21       Lab Results   Component Value Date    UCRR 157 08/28/2017    MICROL 12 08/28/2017    UMALCR 7.58 08/28/2017       Last Basic Metabolic Panel:  Lab Results   Component Value Date     05/24/2017      Lab Results   Component Value Date    POTASSIUM 4.1 05/24/2017     Lab Results   Component Value Date    CHLORIDE 105 05/24/2017     Lab Results   Component Value Date    BUN 11 05/24/2017     Lab Results   Component Value Date    CR 0.54 05/24/2017     GFR Estimate   Date Value Ref Range Status   05/24/2017 >90  Non  GFR Calc   >60 mL/min/1.7m2 Final   03/14/2017 >90  Non  GFR Calc   >60 mL/min/1.7m2 Final   03/21/2016 >60 >60 ml/min/1.73m2 Final     GFR Estimate If Black   Date Value Ref Range Status   05/24/2017 >90   GFR Calc   >60 mL/min/1.7m2 Final   03/14/2017 >90   GFR Calc   >60 mL/min/1.7m2 Final   03/21/2016 >60 >60 ml/min/1.73m2 Final     TSH   Date Value Ref Range Status   05/24/2017 0.85 0.40 - 4.00 mU/L Final   ]    Most Recent Immunizations   Administered Date(s) Administered     Influenza (H1N1) 12/17/2009     Influenza (IIV3) PF 12/17/2009     Influenza Vaccine IM 3yrs+ 4 Valent IIV4 08/31/2017     Pneumococcal 23 valent 09/03/2010     TDAP Vaccine (Adacel) 07/17/2009     Tetanus 10/02/1967       ASSESSMENT:              Current medications were reviewed today as discussed above.      Medication  Adherence: no issues identified    Diabetes: Needs Improvement. Patient is not meeting A1c goal of < 7%. Self monitoring of blood glucose is not at goal of fasting  mg/dL. Pt would benefit from Basal Insulin (Tresiba) :  increase dose to 20 units once daily  Increased exercise--pt would like to start in January  Updated Hemoglobin V9d--llsf do at provider visit next Monday  Weight loss recommended.     Smoking cessation: Needs Improvement. Pt continues to use tobacco. Pt is not ready to quit using tobacco.     Multiple sclerosis:  Needs improvement.  Pt would like a referral to a new neurologist.     PLAN:                  Diabetes:    Increase Tresiba 20 units once daily, continue to increase by 2 units every 3 days for morning blood sugars >130 mg/dL.  HA1C to be done at provider visit on 12/4    New Year Goals, to discuss future plan at next visit:  1)  Exercise  2)  Smoking cessation    MS:  MTM to ask provider about new referral to different neurologist.    I spent 15 minutes with this patient today. All changes were made via collaborative practice agreement with Patricia Chan Ra. A copy of the visit note was provided to the patient's primary care provider.     Will follow up January 9th.    The patient was given a summary of these recommendations as an after visit summary and sent via statusboom.    Katlin Rosado, PharmD BCPS  Medication Therapy Management Practitioner   #[phone]

## 2017-11-28 NOTE — Clinical Note
Seeing you 12/4.  She is looking for a referral for a new neurologist, she previously was at [facility], Dr. Jesus Christianson.

## 2017-12-04 ENCOUNTER — OFFICE VISIT (OUTPATIENT)
Dept: FAMILY MEDICINE | Facility: CLINIC | Age: 52
End: 2017-12-04
Payer: COMMERCIAL

## 2017-12-04 VITALS
OXYGEN SATURATION: 96 % | BODY MASS INDEX: 52.91 KG/M2 | SYSTOLIC BLOOD PRESSURE: 124 MMHG | DIASTOLIC BLOOD PRESSURE: 82 MMHG | TEMPERATURE: 98.3 F | WEIGHT: 293 LBS | HEART RATE: 98 BPM

## 2017-12-04 DIAGNOSIS — I10 ESSENTIAL HYPERTENSION WITH GOAL BLOOD PRESSURE LESS THAN 140/90: ICD-10-CM

## 2017-12-04 DIAGNOSIS — E66.01 OBESITY, MORBID (H): ICD-10-CM

## 2017-12-04 DIAGNOSIS — J32.0 CHRONIC MAXILLARY SINUSITIS: ICD-10-CM

## 2017-12-04 DIAGNOSIS — E11.65 TYPE 2 DIABETES MELLITUS WITH HYPERGLYCEMIA, WITHOUT LONG-TERM CURRENT USE OF INSULIN (H): Primary | ICD-10-CM

## 2017-12-04 DIAGNOSIS — Z12.11 SPECIAL SCREENING FOR MALIGNANT NEOPLASMS, COLON: ICD-10-CM

## 2017-12-04 DIAGNOSIS — Z12.31 ENCOUNTER FOR SCREENING MAMMOGRAM FOR BREAST CANCER: ICD-10-CM

## 2017-12-04 DIAGNOSIS — G35 MS (MULTIPLE SCLEROSIS) (H): ICD-10-CM

## 2017-12-04 LAB — HBA1C MFR BLD: 7.3 % (ref 4.3–6)

## 2017-12-04 PROCEDURE — 36415 COLL VENOUS BLD VENIPUNCTURE: CPT | Performed by: NURSE PRACTITIONER

## 2017-12-04 PROCEDURE — 83036 HEMOGLOBIN GLYCOSYLATED A1C: CPT | Performed by: NURSE PRACTITIONER

## 2017-12-04 PROCEDURE — 80053 COMPREHEN METABOLIC PANEL: CPT | Performed by: NURSE PRACTITIONER

## 2017-12-04 PROCEDURE — 99207 C FOOT EXAM  NO CHARGE: CPT | Performed by: NURSE PRACTITIONER

## 2017-12-04 PROCEDURE — 99214 OFFICE O/P EST MOD 30 MIN: CPT | Performed by: NURSE PRACTITIONER

## 2017-12-04 NOTE — PROGRESS NOTES
SUBJECTIVE:   Yuki Boss is a 52 year old female who presents to clinic today for the following health issues:      Diabetes Follow-up    Patient is checking blood sugars: once daily.  Results are as follows:       am - 160-180        Diabetic concerns: None     Symptoms of hypoglycemia (low blood sugar): shaky     Paresthesias (numbness or burning in feet) or sores: No     Date of last diabetic eye exam: 12/2016    Hyperlipidemia Follow-Up      Rate your low fat/cholesterol diet?: fair    Taking statin?  No    Other lipid medications/supplements?:  none    Hypertension Follow-up      Outpatient blood pressures are not being checked.  Low Salt Diet: no added salt      Amount of exercise or physical activity: None    Problems taking medications regularly: No    Medication side effects: none    Diet: regular (no restrictions) None    Pt has been working hard with MTM to manage DM.  She is losing weight and very happy about this.  She denies any chest pain, palpitations, sob.  No GI symptoms.  She is tolerating her medications without problem.    She is not ready to quit smoking yet, but aware of the need.    Interested in a new neurologist.  Is not finding [facility] to be a good fit.    She is also wondering about who can help with chronic sinus congestion.  No infections.  Congestion, post nasal drip for years.    Problem list and histories reviewed & adjusted, as indicated.  Additional history: as documented    Patient Active Problem List   Diagnosis     Allergic rhinitis     Obesity, morbid (H)     CARDIOVASCULAR SCREENING; LDL GOAL LESS THAN 130     Type 2 diabetes mellitus with hyperglycemia, without long-term current use of insulin (H)     Essential hypertension with goal blood pressure less than 140/90     Mixed hyperlipidemia     Spondylolisthesis of lumbar region     Left lumbar radiculitis     Moderate episode of recurrent major depressive disorder (H)     Physical deconditioning     Past Surgical  History:   Procedure Laterality Date     D & C  10/5/1990     HYSTERECTOMY, PAP NO LONGER INDICATED  2001    bilateral ovaries remain     TONSILLECTOMY         Social History   Substance Use Topics     Smoking status: Current Every Day Smoker     Packs/day: 1.00     Years: 10.00     Smokeless tobacco: Never Used     Alcohol use No     Family History   Problem Relation Age of Onset     Thyroid Disease Mother      CANCER Father      lung, smoker     CANCER Maternal Grandmother      pancreatic     CANCER Paternal Grandfather      lymphoma     Breast Cancer Maternal Aunt      Breast Cancer Paternal Grandmother              Reviewed and updated as needed this visit by clinical staffTobacco  Allergies  Meds  Med Hx  Surg Hx  Fam Hx  Soc Hx      Reviewed and updated as needed this visit by Provider         ROS:  SEE HPI.    OBJECTIVE:     /82  Pulse 98  Temp 98.3  F (36.8  C) (Oral)  Wt (!) 337 lb 12.8 oz (153.2 kg)  SpO2 96%  BMI 52.91 kg/m2  Body mass index is 52.91 kg/(m^2).  GENERAL: healthy, alert and no distress  EYES: Eyes grossly normal to inspection  HENT: ear canals and TM's normal, nose and mouth without ulcers or lesions  NECK: no adenopathy, no asymmetry, masses, or scars and thyroid normal to palpation  RESP: lungs clear to auscultation - no rales, rhonchi or wheezes  CV: regular rate and rhythm, normal S1 S2, no S3 or S4, no murmur, click or rub, no peripheral edema and peripheral pulses strong  PSYCH: mentation appears normal, affect normal/bright  Diabetic foot exam: normal DP and PT pulses, no trophic changes or ulcerative lesions, normal sensory exam and normal monofilament exam    Diagnostic Test Results:  Results for orders placed or performed in visit on 12/04/17 (from the past 24 hour(s))   Hemoglobin A1c   Result Value Ref Range    Hemoglobin A1C 7.3 (H) 4.3 - 6.0 %       ASSESSMENT/PLAN:   1. Type 2 diabetes mellitus with hyperglycemia, without long-term current use of insulin  (H)  Improved a1c today!  Pt working hard.  Will start water aerobics shortly.  Not ready to quit smoking.  MTM visit in January.  Labs today.  Pt agrees with plan and verbalized understanding.  - Hemoglobin A1c  - Comprehensive metabolic panel (BMP + Alb, Alk Phos, ALT, AST, Total. Bili, TP)  - FOOT EXAM    2. Chronic maxillary sinusitis  Discussed options.  No need for abx now.  See ENT to talk about possible CT and treatment.  - OTOLARYNGOLOGY REFERRAL    3. Obesity, morbid (H)  Exercise planned.    4. MS (multiple sclerosis) (H)  Requesting new referral.  - NEUROLOGY ADULT REFERRAL    5. Essential hypertension with goal blood pressure less than 140/90  At goal,  No change.  Continue with plan to exercise and lose weight.  Stop smoking- aware we are here when ready.    6. Encounter for screening mammogram for breast cancer  - *MA Screening Digital Bilateral; Future    7. Special screening for malignant neoplasms, colon  - Fecal colorectal cancer screen (FIT); Future    MICKEY Aguiar Ra, CNP  [facility]

## 2017-12-04 NOTE — NURSING NOTE
"Chief Complaint   Patient presents with     Diabetes       Initial /82  Pulse 98  Temp 98.3  F (36.8  C) (Oral)  Wt (!) 337 lb 12.8 oz (153.2 kg)  SpO2 96%  BMI 52.91 kg/m2 Estimated body mass index is 52.91 kg/(m^2) as calculated from the following:    Height as of 3/14/17: 5' 7\" (1.702 m).    Weight as of this encounter: 337 lb 12.8 oz (153.2 kg).  Medication Reconciliation: complete   Cynthia CINTRON M.A.      "

## 2017-12-04 NOTE — MR AVS SNAPSHOT
After Visit Summary   12/4/2017    Yuki Boss    MRN: [id number]           Patient Information     Date Of Birth          1965        Visit Information        Provider Department      12/4/2017 10:00 AM Patricia Chan Ra, MICKEY [facility]        Today's Diagnoses     Type 2 diabetes mellitus with hyperglycemia, without long-term current use of insulin (H)    -  1    Encounter for screening mammogram for breast cancer        Special screening for malignant neoplasms, colon        MS (multiple sclerosis) (H)        Chronic maxillary sinusitis           Follow-ups after your visit        Additional Services     NEUROLOGY ADULT REFERRAL       Your provider has referred you for the following:   Consult at [facility]: [facility] [phone]   [email or web address]    Please be aware that coverage of these services is subject to the terms and limitations of your health insurance plan.  Call member services at your health plan with any benefit or coverage questions.      Please bring the following with you to your appointment:    (1) Any X-Rays, CTs or MRIs which have been performed.  Contact the facility where they were done to arrange for  prior to your scheduled appointment.    (2) List of current medications  (3) This referral request   (4) Any documents/labs given to you for this referral            OTOLARYNGOLOGY REFERRAL       Your provider has referred you to: [facility]: [facility] [phone]   [email or web address]    Please be aware that coverage of these services is subject to the terms and limitations of your health insurance plan.  Call member services at your health plan with any benefit or coverage questions.      Please bring the following with you to your appointment:    (1) Any X-Rays, CTs or MRIs which have been  performed.  Contact the facility where they were done to arrange for  prior to your scheduled appointment.   (2) List of current medications  (3) This referral request   (4) Any documents/labs given to you for this referral                  Your next 10 appointments already scheduled     Jan 09, 2018 10:30 AM CST   Office Visit with Katlin Rosado [facility] ([facility])    [address] [address] [phone]           Bring a current list of meds and any records pertaining to this visit. For Physicals, please bring immunization records and any forms needing to be filled out. Please arrive 10 minutes early to complete paperwork.              Future tests that were ordered for you today     Open Future Orders        Priority Expected Expires Ordered    Fecal colorectal cancer screen (FIT) Routine 12/25/2017 2/26/2018 12/4/2017    *MA Screening Digital Bilateral Routine  12/4/2018 12/4/2017            Who to contact     If you have questions or need follow up information about today's clinic visit or your schedule please contact [facility] directly at [phone].  Normal or non-critical lab and imaging results will be communicated to you by MyChart, letter or phone within 4 business days after the clinic has received the results. If you do not hear from us within 7 days, please contact the clinic through [employer] or phone. If you have a critical or abnormal lab result, we will notify you by phone as soon as possible.  Submit refill requests through [employer] or call your pharmacy and they will forward the refill request to us. Please allow 3 business days for your refill to be completed.          Additional Information About Your Visit        [employer] Information     [employer] gives you secure access to your electronic health record. If you see a primary care provider, you can also send messages to your care team and make  appointments. If you have questions, please call your primary care clinic.  If you do not have a primary care provider, please call [phone] and they will assist you.        Care EveryWhere ID     This is your Care EveryWhere ID. This could be used by other organizations to access your [facility] medical records  [id number]        Your Vitals Were     Pulse Temperature Pulse Oximetry BMI (Body Mass Index)          98 98.3  F (36.8  C) (Oral) 96% 52.91 kg/m2         Blood Pressure from Last 3 Encounters:   12/04/17 124/82   11/14/17 136/82   10/10/17 122/73    Weight from Last 3 Encounters:   12/04/17 (!) 337 lb 12.8 oz (153.2 kg)   11/14/17 (!) 335 lb 12.8 oz (152.3 kg)   10/10/17 (!) 346 lb 12.8 oz (157.3 kg)              We Performed the Following     Comprehensive metabolic panel (BMP + Alb, Alk Phos, ALT, AST, Total. Bili, TP)     Hemoglobin A1c     NEUROLOGY ADULT REFERRAL     OTOLARYNGOLOGY REFERRAL        Primary Care Provider Office Phone # Fax #    Patricia MICKEY [facility] [phone]       [address] [address] [facility] [address]        Equal Access to Services     NELSY SHORT AH: Hadii aad ku hadasho Soomaali, waaxda luqadaha, qaybta kaalmada adeegyada, waxay idiin haymayan salinas mendez. So [facility] [phone].    ATENCIÓN: Si habla español, tiene a rose disposición servicios gratuitos de asistencia lingüística. Llame al [phone].    We comply with applicable federal civil rights laws and [place] laws. We do not discriminate on the basis of race, color, national origin, age, disability, sex, sexual orientation, or gender identity.            Thank you!     Thank you for choosing [facility]  for your care. Our goal is always to provide you with excellent care. Hearing back from our patients is one way we can continue to improve our services. Please take a few minutes to complete the written survey that you may receive in the mail after your visit with us. Thank  you!             Your Updated Medication List - Protect others around you: Learn how to safely use, store and throw away your medicines at www.disposemymeds.org.          This list is accurate as of: 12/4/17 10:44 AM.  Always use your most recent med list.                   Brand Name Dispense Instructions for use Diagnosis    aspirin 81 MG tablet      Take by mouth daily        blood glucose lancets standard    no brand specified    3 Box    Use to test blood sugar once daily or as directed.    Type 2 diabetes mellitus with hyperglycemia, without long-term current use of insulin (H)       blood glucose monitoring test strip    no brand specified    100 strip    Use to test blood sugars one time daily or as directed    Type 2 diabetes mellitus with hyperglycemia, without long-term current use of insulin (H)       cetirizine 10 MG tablet    zyrTEC     Take 10 mg by mouth daily        cholecalciferol 5000 UNITS Caps capsule    vitamin D3     Take 5,000 Units by mouth daily        fluticasone 50 MCG/ACT spray    FLONASE    1 Bottle    Spray 1-2 sprays into both nostrils daily    Allergic rhinitis due to animal hair and dander, unspecified chronicity       glipiZIDE 5 MG tablet    GLUCOTROL    60 tablet    Take  2 tablets before dinner    Type 2 diabetes mellitus with hyperglycemia, without long-term current use of insulin (H)       insulin degludec 100 UNIT/ML pen    TRESIBA    15 mL    Inject 20 Units Subcutaneous daily Increase by 2 units every 3 days for AM BS>130; max 30 units    Type 2 diabetes mellitus with hyperglycemia, without long-term current use of insulin (H)       insulin pen needle 31G X 5 MM     100 each    See Admin Instructions Use 2 pen needles daily or as directed.    Type 2 diabetes mellitus with hyperglycemia, without long-term current use of insulin (H)       liraglutide 18 MG/3ML soln    VICTOZA PEN    9 mL    Inject 1.8 mg Subcutaneous daily Use 0.6mg once daily for 1 week and then increase to  1.2mg    Type 2 diabetes mellitus with hyperglycemia, without long-term current use of insulin (H)       lisinopril-hydrochlorothiazide 10-12.5 MG per tablet    PRINZIDE/ZESTORETIC    90 tablet    TAKE 1 TABLET BY MOUTH DAILY    Type 2 diabetes mellitus with hyperglycemia, without long-term current use of insulin (H)       metFORMIN 1000 MG tablet    GLUCOPHAGE    180 tablet    Take 1 tablet (1,000 mg) by mouth 2 times daily (with meals)    Type 2 diabetes mellitus with hyperglycemia, without long-term current use of insulin (H)       simvastatin 20 MG tablet    ZOCOR    90 tablet    TAKE 1 TABLET BY MOUTH AT BEDTIME    Mixed hyperlipidemia       TYLENOL 8 HOUR 650 MG CR tablet   Generic drug:  acetaminophen      Take 650 mg by mouth every 8 hours as needed for mild pain or fever        venlafaxine 75 MG tablet    EFFEXOR    180 tablet    Take 1 tablet (75 mg) by mouth 2 times daily    Moderate episode of recurrent major depressive disorder (H)

## 2017-12-05 LAB
ALBUMIN SERPL-MCNC: 3.3 G/DL (ref 3.4–5)
ALP SERPL-CCNC: 73 U/L (ref 40–150)
ALT SERPL W P-5'-P-CCNC: 27 U/L (ref 0–50)
ANION GAP SERPL CALCULATED.3IONS-SCNC: 6 MMOL/L (ref 3–14)
AST SERPL W P-5'-P-CCNC: 20 U/L (ref 0–45)
BILIRUB SERPL-MCNC: 0.2 MG/DL (ref 0.2–1.3)
BUN SERPL-MCNC: 5 MG/DL (ref 7–30)
CALCIUM SERPL-MCNC: 8.8 MG/DL (ref 8.5–10.1)
CHLORIDE SERPL-SCNC: 102 MMOL/L (ref 94–109)
CO2 SERPL-SCNC: 28 MMOL/L (ref 20–32)
CREAT SERPL-MCNC: 0.63 MG/DL (ref 0.52–1.04)
GFR SERPL CREATININE-BSD FRML MDRD: >90 ML/MIN/1.7M2
GLUCOSE SERPL-MCNC: 162 MG/DL (ref 70–99)
POTASSIUM SERPL-SCNC: 4.3 MMOL/L (ref 3.4–5.3)
PROT SERPL-MCNC: 6.9 G/DL (ref 6.8–8.8)
SODIUM SERPL-SCNC: 136 MMOL/L (ref 133–144)

## 2017-12-21 ENCOUNTER — TRANSFERRED RECORDS (OUTPATIENT)
Dept: HEALTH INFORMATION MANAGEMENT | Facility: CLINIC | Age: 52
End: 2017-12-21

## 2018-01-02 PROCEDURE — G0328 FECAL BLOOD SCRN IMMUNOASSAY: HCPCS | Performed by: NURSE PRACTITIONER

## 2018-01-04 ENCOUNTER — TELEPHONE (OUTPATIENT)
Dept: FAMILY MEDICINE | Facility: CLINIC | Age: 53
End: 2018-01-04

## 2018-01-04 DIAGNOSIS — E11.65 TYPE 2 DIABETES MELLITUS WITH HYPERGLYCEMIA, WITHOUT LONG-TERM CURRENT USE OF INSULIN (H): ICD-10-CM

## 2018-01-04 NOTE — TELEPHONE ENCOUNTER
Not due for a refill.     Requested Prescriptions   Pending Prescriptions Disp Refills     VICTOZA PEN 18 MG/3ML soln [Pharmacy Med Name: VICTOZA 3-STEPHAN 18 MG/3 ML PEN]    Last Written Prescription Date:  10/10/2017  Last Fill Quantity: 9ml ,  # refills: 2   Last Office Visit with [facility], [facility] or [facility] prescribing provider:  12/4/2017   Future Office Visit:    Next 5 appointments (look out 90 days)     Jan 16, 2018 10:00 AM CST   Office Visit with Katlin Rosado [facility] ([facility])    [address] [address] [phone]                   2     Sig: INJECT 1.8 MG SUBCUTANEOUS DAILY USE 0.6MG ONCE DAILY FOR 1 WEEK AND THEN INCREASE TO 1.2MG    GLP-1 Agonists Protocol Passed    1/4/2018  1:15 AM       Passed - Blood pressure less than 140/90 in past 6 months    BP Readings from Last 3 Encounters:   12/04/17 124/82   11/14/17 136/82   10/10/17 122/73                Passed - LDL on file in past 12 months    Recent Labs   Lab Test  03/14/17   1515   LDL  94            Passed - Microalbumin on file in past 12 months    Recent Labs   Lab Test  08/28/17   1550   MICROL  12   UMALCR  7.58            Passed - HgbA1C in past 3 or 6 months    Recent Labs   Lab Test  12/04/17   1003   A1C  7.3*            Passed - Patient is age 18 or older       Passed - No active pregnancy on record       Passed - A normal serum creatinine on file in past 12 months    Recent Labs   Lab Test  12/04/17   1003   CR  0.63            Passed - No positive pregnancy test in past 12 months       Passed - Recent visit with authorizing provider's specialty in past 6 months    IV to PO - Antibiotics     None                    glipiZIDE (GLUCOTROL) 5 MG tablet [Pharmacy Med Name: GLIPIZIDE 5 MG TABLET]    Last Written Prescription Date:  11/14/2017  Last Fill Quantity: 60,  # refills: 2   Last Office Visit with [facility], [facility] or [facility] prescribing provider:  12/4/2017   Future Office  Visit:    Next 5 appointments (look out 90 days)     Jan 16, 2018 10:00 AM CST   Office Visit with Katlin Rosado [facility] ([facility])    [address] [address] [phone]                  90 tablet 2     Sig: TAKE 1 TABLET BY MOUTH BEFORE BREAKFAST AND 2 TABLETS BEFORE DINNER    Sulfonylurea Agents Passed    1/4/2018  1:15 AM       Passed - Patient's BP is less than 140/90    BP Readings from Last 3 Encounters:   12/04/17 124/82   11/14/17 136/82   10/10/17 122/73                Passed - Patient has documented LDL within the past 12 mos.    Recent Labs   Lab Test  03/14/17   1515   LDL  94            Passed - Patient has had a Microalbumin in the past 12 mos.    Recent Labs   Lab Test  08/28/17   1550   MICROL  12   UMALCR  7.58            Passed - Patient has documented A1c within the specified period of time.    Recent Labs   Lab Test  12/04/17   1003   A1C  7.3*            Passed - Patient is age 18 or older       Passed - No active pregnancy on record       Passed - Patient has a recent creatinine (normal) within the past 12 mos.    Recent Labs   Lab Test  12/04/17   1003   CR  0.63            Passed - Patient has not had a positive pregnancy test within the past 12 mos.       Passed - Patient has had an appointment with authorizing provider within the past 6 mos. or  within next 30 days    Patient had office visit in the last 6 months or has a visit in the next 30 days with authorizing provider.  See chart review.

## 2018-01-04 NOTE — TELEPHONE ENCOUNTER
Panel Management Review      Patient has the following on her problem list:     Diabetes    ASA: Passed    Last A1C  Lab Results   Component Value Date    A1C 7.3 12/04/2017    A1C 8.0 08/28/2017    A1C 8.9 03/14/2017    A1C 8.5 10/11/2016    A1C 11.1 06/13/2016     A1C tested: FAILED    Last LDL:    Lab Results   Component Value Date    CHOL 166 03/14/2017     Lab Results   Component Value Date    HDL 51 03/21/2016     Lab Results   Component Value Date    LDL 94 03/14/2017    LDL 65 03/21/2016     Lab Results   Component Value Date    TRIG 235 03/21/2016     Lab Results   Component Value Date    CHOLHDLRATIO 3.2 03/21/2016     No results found for: NHDL    Is the patient on a Statin? YES             Is the patient on Aspirin? YES    Medications     HMG CoA Reductase Inhibitors    simvastatin (ZOCOR) 20 MG tablet    Salicylates    aspirin 81 MG tablet          Last three blood pressure readings:  BP Readings from Last 3 Encounters:   12/04/17 124/82   11/14/17 136/82   10/10/17 122/73       Date of last diabetes office visit: 12/4/17     Tobacco History:     History   Smoking Status     Current Every Day Smoker     Packs/day: 1.00     Years: 10.00   Smokeless Tobacco     Never Used             Composite cancer screening  Chart review shows that this patient is due/due soon for the following Mammogram and Fecal Colorectal (FIT)  Summary:    Patient is due/failing the following:   FIT and MAMMOGRAM    Action needed:   Referral given for tests at visit    Type of outreach:    Sent [employer] message.    Questions for provider review:    None                                                                                                                                    Cynthia CINTRON M.A.       Chart routed to Care Team .

## 2018-01-05 RX ORDER — GLIPIZIDE 5 MG/1
TABLET ORAL
Qty: 90 TABLET | Refills: 3 | Status: SHIPPED | OUTPATIENT
Start: 2018-01-05 | End: 2018-01-16 | Stop reason: DRUGHIGH

## 2018-01-05 RX ORDER — LIRAGLUTIDE 6 MG/ML
INJECTION SUBCUTANEOUS
Qty: 9 ML | Refills: 2 | Status: SHIPPED | OUTPATIENT
Start: 2018-01-05 | End: 2018-02-27

## 2018-01-05 NOTE — TELEPHONE ENCOUNTER
Lab Results   Component Value Date    A1C 7.3 12/04/2017    A1C 8.0 08/28/2017    A1C 8.9 03/14/2017    A1C 8.5 10/11/2016    A1C 11.1 06/13/2016     Prescription approved per [facility] Refill Protocol.    Anyi HERNANDEZ RN, BSN, PHN  [place] [facility] RN

## 2018-01-07 LAB — HEMOCCULT STL QL IA: NEGATIVE

## 2018-01-08 DIAGNOSIS — Z12.11 SPECIAL SCREENING FOR MALIGNANT NEOPLASMS, COLON: ICD-10-CM

## 2018-01-16 ENCOUNTER — ALLIED HEALTH/NURSE VISIT (OUTPATIENT)
Dept: PHARMACY | Facility: CLINIC | Age: 53
End: 2018-01-16
Payer: COMMERCIAL

## 2018-01-16 ENCOUNTER — TELEPHONE (OUTPATIENT)
Dept: PHARMACY | Facility: CLINIC | Age: 53
End: 2018-01-16

## 2018-01-16 DIAGNOSIS — E78.2 MIXED HYPERLIPIDEMIA: ICD-10-CM

## 2018-01-16 DIAGNOSIS — E55.9 VITAMIN D DEFICIENCY: ICD-10-CM

## 2018-01-16 DIAGNOSIS — F41.1 GENERALIZED ANXIETY DISORDER: ICD-10-CM

## 2018-01-16 DIAGNOSIS — E11.65 TYPE 2 DIABETES MELLITUS WITH HYPERGLYCEMIA, WITHOUT LONG-TERM CURRENT USE OF INSULIN (H): Primary | ICD-10-CM

## 2018-01-16 DIAGNOSIS — G35 MS (MULTIPLE SCLEROSIS) (H): ICD-10-CM

## 2018-01-16 DIAGNOSIS — M19.90 ARTHRITIS PAIN: ICD-10-CM

## 2018-01-16 DIAGNOSIS — Z72.0 TOBACCO ABUSE: ICD-10-CM

## 2018-01-16 DIAGNOSIS — I10 ESSENTIAL HYPERTENSION WITH GOAL BLOOD PRESSURE LESS THAN 140/90: ICD-10-CM

## 2018-01-16 DIAGNOSIS — J30.9 CHRONIC ALLERGIC RHINITIS, UNSPECIFIED SEASONALITY, UNSPECIFIED TRIGGER: ICD-10-CM

## 2018-01-16 PROCEDURE — 99607 MTMS BY PHARM ADDL 15 MIN: CPT | Performed by: PHARMACIST

## 2018-01-16 PROCEDURE — 99605 MTMS BY PHARM NP 15 MIN: CPT | Performed by: PHARMACIST

## 2018-01-16 NOTE — MR AVS SNAPSHOT
After Visit Summary   1/16/2018    Yuki Boss    MRN: [id number]           Patient Information     Date Of Birth          1965        Visit Information        Provider Department      1/16/2018 10:00 AM Katlin Rosado, [facility] MTM        Today's Diagnoses     Type 2 diabetes mellitus with hyperglycemia, without long-term current use of insulin (H)    -  1    Arthritis pain        Vitamin D deficiency        Tobacco abuse        MS (multiple sclerosis) (H)        Generalized anxiety disorder        Chronic allergic rhinitis, unspecified seasonality, unspecified trigger        Essential hypertension with goal blood pressure less than 140/90        Mixed hyperlipidemia          Care Instructions    Recommendations from today's MTM visit:                                                    Knee Pain:  1) Take acetaminophen 650mg, 2 tablets three times daily.  When you buy this again, buy the 500mg tablets and can take up to 2 tablets three times daily  2) Try Aspercreme: Trolamine Salicylate 10% cream topically to the affected knee up to 4 times daily.     Diabetes:  1) Decrease dinner carbohydrate intake to less than 60 grams per dinner meal.  2) Take glipizide 10mg with dinner meal. (eliminate 5mg breakfast dose)    Vitamin D deficiency:  1) Get vitamin D level at next lab appointment in March    Next MTM/pharmacist visit: 1 month 2/27 at 10am via telephone    To schedule another MTM appointment, please call the clinic directly or you may call the [facility] scheduling line at [phone] or toll-free at [phone].     My Clinical Pharmacist's contact information:                                                      It was a pleasure seeing you today!  Please feel free to contact me with any questions or concerns you have.      Katlin Rosado, PharmD [facility]  Medication Therapy Management Practitioner   #[phone]    You may receive a survey about the MT  services you received.  I would appreciate your feedback to help me serve you better in the future. Please fill it out and return it when you can. Your comments will be anonymous.                  Follow-ups after your visit        Your next 10 appointments already scheduled     Feb 27, 2018 10:00 AM CST   TELEMEDICINE with Katlin Rosado [facility] ([facility])    [address] [facility] [address] [phone]           Note: this is not an onsite visit; there is no need to come to the facility.              Who to contact     If you have questions or need follow up information about today's clinic visit or your schedule please contact [facility] directly at [phone].  Normal or non-critical lab and imaging results will be communicated to you by MyChart, letter or phone within 4 business days after the clinic has received the results. If you do not hear from us within 7 days, please contact the clinic through MyChart or phone. If you have a critical or abnormal lab result, we will notify you by phone as soon as possible.  Submit refill requests through [employer] or call your pharmacy and they will forward the refill request to us. Please allow 3 business days for your refill to be completed.          Additional Information About Your Visit        MyChart Information     [employer] gives you secure access to your electronic health record. If you see a primary care provider, you can also send messages to your care team and make appointments. If you have questions, please call your primary care clinic.  If you do not have a primary care provider, please call [phone] and they will assist you.        Care EveryWhere ID     This is your Care EveryWhere ID. This could be used by other organizations to access your [facility] medical records  [id number]         Blood Pressure from Last 3 Encounters:   12/04/17 124/82   11/14/17 136/82    10/10/17 122/73    Weight from Last 3 Encounters:   12/04/17 (!) 337 lb 12.8 oz (153.2 kg)   11/14/17 (!) 335 lb 12.8 oz (152.3 kg)   10/10/17 (!) 346 lb 12.8 oz (157.3 kg)              Today, you had the following     No orders found for display         Today's Medication Changes          These changes are accurate as of: 1/16/18 11:59 PM.  If you have any questions, ask your nurse or doctor.               These medicines have changed or have updated prescriptions.        Dose/Directions    glipiZIDE 5 MG tablet   Commonly known as:  GLUCOTROL   This may have changed:  Another medication with the same name was removed. Continue taking this medication, and follow the directions you see here.   Used for:  Type 2 diabetes mellitus with hyperglycemia, without long-term current use of insulin (H)   Changed by:  Katlin Rosado RPH        Take  2 tablets before dinner   Quantity:  60 tablet   Refills:  2       insulin degludec 100 UNIT/ML pen   Commonly known as:  TRESIBA   This may have changed:    - how much to take  - additional instructions   Used for:  Type 2 diabetes mellitus with hyperglycemia, without long-term current use of insulin (H)   Changed by:  Katlin Rosado RPH        Dose:  50 Units   Inject 50 Units Subcutaneous daily   Quantity:  15 mL   Refills:  1            Where to get your medicines      These medications were sent to [facility]/pharmacy #[id number] - [facility] [id number] [facility] [address]     Phone:  [phone]     insulin degludec 100 UNIT/ML pen    metFORMIN 1000 MG tablet                Primary Care Provider Office Phone # Fax #    MICKEY Aguiar Ra [facility] [phone]       [address]  [facility] hospital [address]        Equal Access to Services     NELSY SHORT : Zoe Coon, wasadia haas, qaforrest kaalkacy parada, cynthia mendez. So [facility] [phone].    ATENCIÓN: Lali conklin  español, tiene a rose disposición servicios gratuitos de asistencia lingüística. Soila carrera [phone].    We comply with applicable federal civil rights laws and [place] laws. We do not discriminate on the basis of race, color, national origin, age, disability, sex, sexual orientation, or gender identity.            Thank you!     Thank you for choosing [facility]  for your care. Our goal is always to provide you with excellent care. Hearing back from our patients is one way we can continue to improve our services. Please take a few minutes to complete the written survey that you may receive in the mail after your visit with us. Thank you!             Your Updated Medication List - Protect others around you: Learn how to safely use, store and throw away your medicines at www.disposemymeds.org.          This list is accurate as of: 1/16/18 11:59 PM.  Always use your most recent med list.                   Brand Name Dispense Instructions for use Diagnosis    aspirin 81 MG tablet      Take by mouth daily        blood glucose lancets standard    no brand specified    3 Box    Use to test blood sugar once daily or as directed.    Type 2 diabetes mellitus with hyperglycemia, without long-term current use of insulin (H)       blood glucose monitoring test strip    no brand specified    100 strip    Use to test blood sugars one time daily or as directed    Type 2 diabetes mellitus with hyperglycemia, without long-term current use of insulin (H)       cetirizine 10 MG tablet    zyrTEC     Take 10 mg by mouth daily        cholecalciferol 5000 UNITS Caps capsule    vitamin D3     Take 5,000 Units by mouth daily        fluticasone 50 MCG/ACT spray    FLONASE    1 Bottle    Spray 1-2 sprays into both nostrils daily    Allergic rhinitis due to animal hair and dander, unspecified chronicity       glipiZIDE 5 MG tablet    GLUCOTROL    60 tablet    Take  2 tablets before dinner    Type 2 diabetes mellitus with  hyperglycemia, without long-term current use of insulin (H)       insulin degludec 100 UNIT/ML pen    TRESIBA    15 mL    Inject 50 Units Subcutaneous daily    Type 2 diabetes mellitus with hyperglycemia, without long-term current use of insulin (H)       insulin pen needle 31G X 5 MM     100 each    See Admin Instructions Use 2 pen needles daily or as directed.    Type 2 diabetes mellitus with hyperglycemia, without long-term current use of insulin (H)       lisinopril-hydrochlorothiazide 10-12.5 MG per tablet    PRINZIDE/ZESTORETIC    90 tablet    TAKE 1 TABLET BY MOUTH DAILY    Type 2 diabetes mellitus with hyperglycemia, without long-term current use of insulin (H)       metFORMIN 1000 MG tablet    GLUCOPHAGE    180 tablet    Take 1 tablet (1,000 mg) by mouth 2 times daily (with meals)    Type 2 diabetes mellitus with hyperglycemia, without long-term current use of insulin (H)       simvastatin 20 MG tablet    ZOCOR    90 tablet    TAKE 1 TABLET BY MOUTH AT BEDTIME    Mixed hyperlipidemia       TYLENOL 8 HOUR 650 MG CR tablet   Generic drug:  acetaminophen      Take 650 mg by mouth every 8 hours as needed for mild pain or fever        venlafaxine 75 MG tablet    EFFEXOR    180 tablet    Take 1 tablet (75 mg) by mouth 2 times daily    Moderate episode of recurrent major depressive disorder (H)       VICTOZA PEN 18 MG/3ML soln   Generic drug:  liraglutide     9 mL    INJECT 1.8 MG SUBCUTANEOUS DAILY USE 0.6MG ONCE DAILY FOR 1 WEEK AND THEN INCREASE TO 1.2MG    Type 2 diabetes mellitus with hyperglycemia, without long-term current use of insulin (H)

## 2018-01-16 NOTE — PROGRESS NOTES
SUBJECTIVE/OBJECTIVE:                Yuki Boss is a 52 year old female called for an new visit for Medication Therapy Management.  She was referred to me from Patricia Chan CNP.     Chief Complaint: Follow up from our visit on 11/28/17.  Question on acetaminophen for knee pain.    Personal Healthcare Goals: Pain and Diabetes management.  Tobacco: 0-1 pack per day - is not interested in quittingTobacco Cessation Action Plan: Information offered: Patient not interested at this time  Alcohol: not currently using  Caffeine: 2 cups per day    Medication Adherence: no issues reported    Knee pain: Pt is currently taking acetaminophen 650mg twice daily, Biofreeze topically, and applying ice topically as needed.  Woke up with excruciating knee pain. She is not sure it was MS or due to to osteoarthritis as seen on Xray. Pain is localized to her knee cap and the side of her knee. The pain is limiting her mobility to requiring a walker from not requiring a walker prior. The pain also reduces the quality of sleep too. Acetaminophen helps and she was wondering if she could increase her dose/frequency of acetaminophen.     Diabetes:  Pt currently taking Victoza 1.8 mg QD, metformin 1000 mg PO BID, glipizide 5mg at breakfast in the last week (RX filled this way at refill) and 10mg at night, Tresiba 50 units once daily . Pt was previously on pioglitazone before starting the Victoza. She was taken off the pioglitazone because of lower leg edema and weight gain.   SMBG: one time daily or less. BG usually reads  later in the day. She reports feeling shaky, dizzy, and sick when her BG hits 74-79.  Date FBG/ 2hours post Lunch/2hours post Dinner /2hours post Bedtime   1/16 142      1/15 153      1/14 154  74    1/13 122      1/12 145      1/11   79 147, 162   1/10 155   279     Symptoms of low blood sugar? shaky, dizzy, sick when her BG hit 74-79  Frequency of hypoglycemia?  Recent symptoms of high blood sugar?  none  Exercise: Limited due to knee pain.   Dinner is biggest meal  Breakfast cinda cracker  Lunch: ham bun or ham and cheese or turkey bun, or soup  Dinner:  Last night [employer]aco [employer]--3 hard shell tacos (39g carbs) and cheesy potatoes (28g carbs) [67g carbs total]   Continues to take aspirin 81mg QD with no problems.    Lab Results   Component Value Date    A1C 7.3 12/04/2017    A1C 8.0 08/28/2017    A1C 8.9 03/14/2017    A1C 8.5 10/11/2016    A1C 11.1 06/13/2016     Wt Readings from Last 4 Encounters:   12/04/17 (!) 337 lb 12.8 oz (153.2 kg)   11/14/17 (!) 335 lb 12.8 oz (152.3 kg)   10/10/17 (!) 346 lb 12.8 oz (157.3 kg)   09/12/17 (!) 357 lb 8 oz (162.2 kg)     Vitamin D deficiency: taking vitamin D 5000 IU QD. Was started because her vitamin D level was low - thinks it was around 13 ng/mL; level not available through [place] records. Due for a vitamin D level. Patient asked about getting another vitamin B12 level, but her last lab was 315 pg/mL (Ref 193-986pg/mL) on 5/24/17.     Multiple sclerosis: Diagnosed in the past year. Seeing a new neurologist--1/29th. Just saw neurologist in early October.  Copaxone for treatment has been recommended she is not currently taking this.      Anxiety: venlafaxine 75 mg PO BID. Pt feels this medication has helped her a lot. She denies dizziness, nausea, tremor, upset stomach, or any other side effects from this medication.      TONY-7 SCORE 3/14/2017   Total Score 7   Some recent data might be hidden       PHQ-9 SCORE 7/17/2009 3/14/2017 10/4/2017   Total Score 18 - -   Total Score MyChart - - 3 (Minimal depression)   Total Score - 6 3   Some recent data might be hidden      Allergies: Patient takes fluticasone nasal spray two sprays in each nostril twice daily, saline mist 3-5 times a day, cetirizine/loratadine QD. No side effects reported by the patient. Not effective, continues to have symptoms.  Recently saw ENT.  Last ENT visit: patient was told to cut caffeine out.  (reason unknown). She is scheduled for CT scan January 21st for further evaluation.    Smoking cessation: Pt Continues to smoke 1 pack per day.  Smoking cessation support was offered, but was declined. Pt stated not being ready to quit quite yet.     Hypertension: Current medications include lisinopril-hctz 10-12.5mg QD.  Patient. Patient reports no current medication side effects.  BP Readings from Last 6 Encounters:   12/04/17 124/82   11/14/17 136/82   10/10/17 122/73   09/12/17 137/79   08/28/17 128/64   05/24/17 118/78     Hyperlipidemia: Current therapy includes simvastatin 20mg once daily.  Pt reports no significant myalgias or other side effects.   The 10-year ASCVD risk score (Icardmaya FONTANEZ Jr, et al., 2013) is: 9.5%    Values used to calculate the score:      Age: 52 years      Sex: Female      Is Non- : No      Diabetic: Yes      Tobacco smoker: Yes      Systolic Blood Pressure: 124 mmHg      Is BP treated: Yes      HDL Cholesterol: 51 mg/dL      Total Cholesterol: 166 mg/dL       Today's Vitals: There were no vitals taken for this visit. telemed  Lab Results   Component Value Date    A1C 7.3 12/04/2017   .  Lab Results   Component Value Date    CHOL 166 03/14/2017     Lab Results   Component Value Date    TRIG 235 03/21/2016     Lab Results   Component Value Date    HDL 51 03/21/2016     Lab Results   Component Value Date    LDL 94 03/14/2017    LDL 65 03/21/2016       Liver Function Studies -   Recent Labs   Lab Test  12/04/17   1003   PROTTOTAL  6.9   ALBUMIN  3.3*   BILITOTAL  0.2   ALKPHOS  73   AST  20   ALT  27       Lab Results   Component Value Date    UCRR 157 08/28/2017    MICROL 12 08/28/2017    UMALCR 7.58 08/28/2017       Last Basic Metabolic Panel:  Lab Results   Component Value Date     12/04/2017      Lab Results   Component Value Date    POTASSIUM 4.3 12/04/2017     Lab Results   Component Value Date    CHLORIDE 102 12/04/2017     Lab Results   Component Value  Date    BUN 5 12/04/2017     Lab Results   Component Value Date    CR 0.63 12/04/2017     GFR Estimate   Date Value Ref Range Status   12/04/2017 >90 >60 mL/min/1.7m2 Final     Comment:     Non  GFR Calc   05/24/2017 >90  Non  GFR Calc   >60 mL/min/1.7m2 Final   03/14/2017 >90  Non  GFR Calc   >60 mL/min/1.7m2 Final     GFR Estimate If Black   Date Value Ref Range Status   12/04/2017 >90 >60 mL/min/1.7m2 Final     Comment:      GFR Calc   05/24/2017 >90   GFR Calc   >60 mL/min/1.7m2 Final   03/14/2017 >90   GFR Calc   >60 mL/min/1.7m2 Final     TSH   Date Value Ref Range Status   05/24/2017 0.85 0.40 - 4.00 mU/L Final   ]    Most Recent Immunizations   Administered Date(s) Administered     Influenza (H1N1) 12/17/2009     Influenza (IIV3) PF 12/17/2009     Influenza Vaccine IM 3yrs+ 4 Valent IIV4 08/31/2017     Pneumococcal 23 valent 09/03/2010     TDAP Vaccine (Adacel) 07/17/2009     Tetanus 10/02/1967       ASSESSMENT:              Current medications were reviewed today as discussed above.      Medication Adherence: no issues identified    Knee Pain: Needs Improvement. Patient is still experiencing knee pain on APAP 650mg BID, Biofreeze, and Ice packs. The pain is limiting her mobility to requiring a walker from not requiring a walker prior. The pain also reduces the quality of sleep too. Patient would benefit from increasing from acetaminophen 650 mg BID to acetaminophen 650 mg TID and at next fill changing to 500mg tablets.  Discussed typical max dose of 3000mg per day but absolute max of 4000mg per day. Patient to try topical Aspercreme - triolamine salicylate too.Patient was informed to contact sports/orthopedic medicine if she requires additional help.     Diabetes: Needs Improvement. Patient is not meeting A1c goal of < 7%. Discussed limiting her carb intake during dinner to <60g carbs. Discussed taking glipizide  10mg at dinner time only (eliminating AM dose to reduce chances of hypoglycemia due to small breakfast meals).     Vit. D deficiency: Needs Improvement. Patient taking vitamin D 5000 IU QD since 8/28/17 and requires a Vit.D level to assess efficacy.     Smoking Cessation: Needs improvement. Pt Continues to smoke 1 pack per day.  Smoking cessation support was offered, but was declined. Pt not ready to quit. Reassess at next follow up.     Multiple Sclerosis: Unimproved.  Pt would benefit from evaluation by new neurologist at 1/29/18 appointment.    Anxiety: Stable. Patient states the venlafaxine has helped a lot.      Allergies: Needs improvement. Pt would benefit from continuing to work with ENT.    Hypertension: Stable. Patient is meeting BP goal of < 130/80mmHg.    Hyperlipidemia: stable.  Pt would benefit from lipid panel in March. LDL <100    PLAN:                  Knee Pain:  1) patient to take acetaminophen 650mg, 2 tablets  three times daily, change to 500mg tablet strength   2) patient to try Aspercreme: Trolamine Salicylate 10% cream topically to the affected knee up to 4 times daily.     Diabetes:  1) patient to decrease dinner carb intake to less than 60 grams per dinner meal.  2) patient to take glipizide 10mg with dinner meal. (eliminate 5mg breakfast dose)  Future visit:  Discuss changing Tresiba to the 200 units/ml (only gave 1 month with 1 refill of 100 unit/ml due to wanting to make this change)    Vitamin D deficiency:  1) patient to obtain vitamin D level at next lab appointment in March, future order previously placed.     Smoking Cessation:  1) assess smoking cessation at next MT visit in February.    Multiple Sclerosis:  1) patient to see new neurologist on 1/29/18.        I spent 30 minutes with this patient today. All changes were made via collaborative practice agreement with Patricia Chan Ra. A copy of the visit note was provided to the patient's primary care provider.     Will follow  up in 1 month. (02/27/18 10:00am)    The patient was sent via [employer] a summary of these recommendations as an after visit summary.    Katlin Rosado, PharmD [facility]  Medication Therapy Management Practitioner   #[phone]    John Gaona PharmD IV Student

## 2018-01-17 NOTE — PATIENT INSTRUCTIONS
Recommendations from today's MTM visit:                                                    Knee Pain:  1) Take acetaminophen 650mg, 2 tablets three times daily.  When you buy this again, buy the 500mg tablets and can take up to 2 tablets three times daily  2) Try Aspercreme: Trolamine Salicylate 10% cream topically to the affected knee up to 4 times daily.     Diabetes:  1) Decrease dinner carbohydrate intake to less than 60 grams per dinner meal.  2) Take glipizide 10mg with dinner meal. (eliminate 5mg breakfast dose)    Vitamin D deficiency:  1) Get vitamin D level at next lab appointment in March    Next MTM/pharmacist visit: 1 month 2/27 at 10am via telephone    To schedule another MTM appointment, please call the clinic directly or you may call the MTM scheduling line at [phone] or toll-free at [phone].     My Clinical Pharmacist's contact information:                                                      It was a pleasure seeing you today!  Please feel free to contact me with any questions or concerns you have.      Katlin Rosado, PharmD [facility]  Medication Therapy Management Practitioner   #[phone]    You may receive a survey about the MTM services you received.  I would appreciate your feedback to help me serve you better in the future. Please fill it out and return it when you can. Your comments will be anonymous.

## 2018-01-19 DIAGNOSIS — E11.65 TYPE 2 DIABETES MELLITUS WITH HYPERGLYCEMIA, WITHOUT LONG-TERM CURRENT USE OF INSULIN (H): ICD-10-CM

## 2018-01-19 NOTE — TELEPHONE ENCOUNTER
"Requested Prescriptions   Pending Prescriptions Disp Refills     metFORMIN (GLUCOPHAGE) 1000 MG tablet [Pharmacy Med Name: METFORMIN HCL 1,000 MG TABLET]  Last Written Prescription Date:  1/17/18  Last Fill Quantity: 180,  # refills: 0   Last Office Visit with FMKHANH, SMILEY or [facility] prescribing provider:  1/16/2018     Future Office Visit:      180 tablet 0     Sig: TAKE 1 TABLET (1,000 MG) BY MOUTH 2 TIMES DAILY (WITH MEALS)    Biguanide Agents Passed    1/19/2018  9:48 AM       Passed - Patient's BP is less than 140/90    BP Readings from Last 3 Encounters:   12/04/17 124/82   11/14/17 136/82   10/10/17 122/73                Passed - Patient has documented LDL within the past 12 mos.    Recent Labs   Lab Test  03/14/17   1515   LDL  94            Passed - Patient has had a Microalbumin in the past 12 mos.    Recent Labs   Lab Test  08/28/17   1550   MICROL  12   UMALCR  7.58            Passed - Patient is age 10 or older       Passed - Patient has documented A1c within the specified period of time.    Recent Labs   Lab Test  12/04/17   1003   A1C  7.3*            Passed - Patient's CR is NOT>1.4 OR Patient's EGFR is NOT<45 within past 12 mos.    Recent Labs   Lab Test  12/04/17   1003   GFRESTIMATED  >90   GFRESTBLACK  >90       Recent Labs   Lab Test  12/04/17   1003   CR  0.63            Passed - Patient does NOT have a diagnosis of CHF.       Passed - Patient is not pregnant       Passed - Patient has not had a positive pregnancy test within the past 12 mos.        Passed - Recent (6 mos) or future visit with authorizing provider's specialty    Patient had office visit in the last 6 months or has a visit in the next 30 days with authorizing provider.  See \"Patient Info\" tab in inbasket, or \"Choose Columns\" in Meds & Orders section of the refill encounter.              "

## 2018-02-27 ENCOUNTER — ALLIED HEALTH/NURSE VISIT (OUTPATIENT)
Dept: PHARMACY | Facility: CLINIC | Age: 53
End: 2018-02-27
Payer: COMMERCIAL

## 2018-02-27 DIAGNOSIS — G35 MS (MULTIPLE SCLEROSIS) (H): ICD-10-CM

## 2018-02-27 DIAGNOSIS — E55.9 VITAMIN D DEFICIENCY: ICD-10-CM

## 2018-02-27 DIAGNOSIS — E11.65 TYPE 2 DIABETES MELLITUS WITH HYPERGLYCEMIA, WITHOUT LONG-TERM CURRENT USE OF INSULIN (H): Primary | ICD-10-CM

## 2018-02-27 DIAGNOSIS — M19.90 ARTHRITIS PAIN: ICD-10-CM

## 2018-02-27 DIAGNOSIS — Z13.6 CARDIOVASCULAR SCREENING; LDL GOAL LESS THAN 130: ICD-10-CM

## 2018-02-27 DIAGNOSIS — Z72.0 TOBACCO ABUSE: ICD-10-CM

## 2018-02-27 PROCEDURE — 99607 MTMS BY PHARM ADDL 15 MIN: CPT | Performed by: PHARMACIST

## 2018-02-27 PROCEDURE — 99606 MTMS BY PHARM EST 15 MIN: CPT | Performed by: PHARMACIST

## 2018-02-27 RX ORDER — LIRAGLUTIDE 6 MG/ML
1.8 INJECTION SUBCUTANEOUS DAILY
Qty: 9 ML | Refills: 2 | Status: SHIPPED | OUTPATIENT
Start: 2018-02-27 | End: 2018-04-10

## 2018-02-27 RX ORDER — NICOTINE 21 MG/24HR
1 PATCH, TRANSDERMAL 24 HOURS TRANSDERMAL EVERY 24 HOURS
COMMUNITY
End: 2018-04-10

## 2018-02-27 NOTE — MR AVS SNAPSHOT
After Visit Summary   2/27/2018    Yuki Boss    MRN: [id number]           Patient Information     Date Of Birth          1965        Visit Information        Provider Department      2/27/2018 10:00 AM Katlin Rosado, [facility] MTM        Today's Diagnoses     Type 2 diabetes mellitus with hyperglycemia, without long-term current use of insulin (H)    -  1    CARDIOVASCULAR SCREENING; LDL GOAL LESS THAN 130        Vitamin D deficiency        Tobacco abuse        Arthritis pain        MS (multiple sclerosis) (H)          Care Instructions    Recommendations from today's MTM visit:                                                      Smoking cessation:  Great job quitting cigarettes!  Continue nicotine patch and lozenges for 6 weeks before stepping down.    Diabetes:    1.  Sent a new prescription for higher concentration of Tresiba 200 units/ml.  2.  Decrease Tresiba to 46-48 units daily to see if it helps with feelings of low blood sugar.   3.  Sent a prescription for 3 pens of Victoza for the 1.8mg once daily dose.   4.  Due for hemogloblin A1C and lipid panel next month    Vitamin D deficiency: Due for vitamin D levels next month    Next MTM/pharmacist visit: 1 month (March) with MTM and Patricia Chan CNP    To schedule another MTM appointment, please call the clinic directly or you may call the MTM scheduling line at [phone] or toll-free at [phone].     My Clinical Pharmacist's contact information:                                                      It was a pleasure seeing you today!  Please feel free to contact me with any questions or concerns you have.      Katlin Rosado, [facility]  Medication Therapy Management Practitioner   #[phone]    You may receive a survey about the MTM services you received.  I would appreciate your feedback to help me serve you better in the future. Please fill it out and return it when you can. Your  comments will be anonymous.                  Follow-ups after your visit        Your next 10 appointments already scheduled     Mar 20, 2018  8:30 AM CDT   LAB with  LAB   [facility] ([facility]    [address] [address] [address] [phone]           Please do not eat 10-12 hours before your appointment if you are coming in fasting for labs on lipids, cholesterol, or glucose (sugar). This does not apply to pregnant women. Water, hot tea and black coffee (with nothing added) are okay. Do not drink other fluids, diet soda or chew gum.            Mar 23, 2018 10:40 AM CDT   Office Visit with MICKEY Aguiar Ra, CNP   [facility] ([facility])    [address] [address] [phone]           Bring a current list of meds and any records pertaining to this visit. For Physicals, please bring immunization records and any forms needing to be filled out. Please arrive 10 minutes early to complete paperwork.            Apr 10, 2018 11:00 AM CDT   Office Visit with Katlin Rosado [facility] ([facility])    [address] [address] [phone]           Bring a current list of meds and any records pertaining to this visit. For Physicals, please bring immunization records and any forms needing to be filled out. Please arrive 10 minutes early to complete paperwork.              Future tests that were ordered for you today     Open Future Orders        Priority Expected Expires Ordered    Hemoglobin A1c Routine  5/28/2018 2/27/2018    Lipid panel reflex to direct LDL Fasting Routine  5/28/2018 2/27/2018            Who to contact     If you have questions or need follow up information about today's clinic visit or your schedule please contact [facility] directly at [phone].  Normal or non-critical lab and imaging results  will be communicated to you by [employer], letter or phone within 4 business days after the clinic has received the results. If you do not hear from us within 7 days, please contact the clinic through [employer] or phone. If you have a critical or abnormal lab result, we will notify you by phone as soon as possible.  Submit refill requests through [employer] or call your pharmacy and they will forward the refill request to us. Please allow 3 business days for your refill to be completed.          Additional Information About Your Visit        [employer] Information     [employer] gives you secure access to your electronic health record. If you see a primary care provider, you can also send messages to your care team and make appointments. If you have questions, please call your primary care clinic.  If you do not have a primary care provider, please call [phone] and they will assist you.        Care EveryWhere ID     This is your Care EveryWhere ID. This could be used by other organizations to access your [place] medical records  [id number]         Blood Pressure from Last 3 Encounters:   12/04/17 124/82   11/14/17 136/82   10/10/17 122/73    Weight from Last 3 Encounters:   12/04/17 (!) 337 lb 12.8 oz (153.2 kg)   11/14/17 (!) 335 lb 12.8 oz (152.3 kg)   10/10/17 (!) 346 lb 12.8 oz (157.3 kg)                 Today's Medication Changes          These changes are accurate as of 2/27/18 11:40 AM.  If you have any questions, ask your nurse or doctor.               Start taking these medicines.        Dose/Directions    insulin degludec 200 UNIT/ML pen   Commonly known as:  TRESIBA   Used for:  Type 2 diabetes mellitus with hyperglycemia, without long-term current use of insulin (H)   Replaces:  insulin degludec 100 UNIT/ML pen   Started by:  Katlin Rosado RPH        Dose:  48 Units   Inject 48 Units Subcutaneous daily   Quantity:  9 mL   Refills:  2         These medicines have changed or have updated prescriptions.         Dose/Directions    liraglutide 18 MG/3ML soln   Commonly known as:  VICTOZA PEN   This may have changed:  See the new instructions.   Used for:  Type 2 diabetes mellitus with hyperglycemia, without long-term current use of insulin (H)   Changed by:  Katlin Rosado RPH        Dose:  1.8 mg   Inject 1.8 mg Subcutaneous daily   Quantity:  9 mL   Refills:  2         Stop taking these medicines if you haven't already. Please contact your care team if you have questions.     insulin degludec 100 UNIT/ML pen   Commonly known as:  TRESIBA   Replaced by:  insulin degludec 200 UNIT/ML pen   Stopped by:  Katlin Rosado RPH                Where to get your medicines      These medications were sent to [facility]/pharmacy #[id number] - [facility] [address]  [address] [facility] [address]     Phone:  [phone]     insulin degludec 200 UNIT/ML pen    liraglutide 18 MG/3ML soln                Primary Care Provider Office Phone # Fax #    Patricia Ephraim Chan, APRN [facility] [phone]       [address] [address] [address]        Equal Access to Services     [facility] Hospital[facility] AH: Hadii aad ku hadasho Soomaali, waaxda luqadaha, qaybta kaalmada adeegyada, cynthia azevedoin hayaan adeadore gilliland . So [facility] [phone].    ATENCIÓN: Si habla español, tiene a rose disposición servicios gratuitos de asistencia lingüística. Llame al [phone].    We comply with applicable federal civil rights laws and [place] laws. We do not discriminate on the basis of race, color, national origin, age, disability, sex, sexual orientation, or gender identity.            Thank you!     Thank you for choosing [facility]  for your care. Our goal is always to provide you with excellent care. Hearing back from our patients is one way we can continue to improve our services. Please take a few minutes to complete the written survey that you may receive in the mail after your visit with us.  Thank you!             Your Updated Medication List - Protect others around you: Learn how to safely use, store and throw away your medicines at www.disposemymeds.org.          This list is accurate as of 2/27/18 11:40 AM.  Always use your most recent med list.                   Brand Name Dispense Instructions for use Diagnosis    ACETAMINOPHEN PO      Take 500-1,000 mg by mouth every 8 hours as needed for pain        aspirin 81 MG tablet      Take by mouth daily        blood glucose lancets standard    no brand specified    3 Box    Use to test blood sugar once daily or as directed.    Type 2 diabetes mellitus with hyperglycemia, without long-term current use of insulin (H)       blood glucose monitoring test strip    no brand specified    100 strip    Use to test blood sugars one time daily or as directed    Type 2 diabetes mellitus with hyperglycemia, without long-term current use of insulin (H)       cetirizine 10 MG tablet    zyrTEC     Take 10 mg by mouth daily        cholecalciferol 5000 UNITS Caps capsule    vitamin D3     Take 5,000 Units by mouth daily        fluticasone 50 MCG/ACT spray    FLONASE    1 Bottle    Spray 1-2 sprays into both nostrils daily    Allergic rhinitis due to animal hair and dander, unspecified chronicity       glipiZIDE 5 MG tablet    GLUCOTROL    60 tablet    Take  2 tablets before dinner    Type 2 diabetes mellitus with hyperglycemia, without long-term current use of insulin (H)       insulin degludec 200 UNIT/ML pen    TRESIBA    9 mL    Inject 48 Units Subcutaneous daily    Type 2 diabetes mellitus with hyperglycemia, without long-term current use of insulin (H)       insulin pen needle 31G X 5 MM     100 each    See Admin Instructions Use 2 pen needles daily or as directed.    Type 2 diabetes mellitus with hyperglycemia, without long-term current use of insulin (H)       liraglutide 18 MG/3ML soln    VICTOZA PEN    9 mL    Inject 1.8 mg Subcutaneous daily    Type 2 diabetes  mellitus with hyperglycemia, without long-term current use of insulin (H)       lisinopril-hydrochlorothiazide 10-12.5 MG per tablet    PRINZIDE/ZESTORETIC    90 tablet    TAKE 1 TABLET BY MOUTH DAILY    Type 2 diabetes mellitus with hyperglycemia, without long-term current use of insulin (H)       metFORMIN 1000 MG tablet    GLUCOPHAGE    180 tablet    Take 1 tablet (1,000 mg) by mouth 2 times daily (with meals)    Type 2 diabetes mellitus with hyperglycemia, without long-term current use of insulin (H)       nicotine 21 MG/24HR 24 hr patch    NICODERM CQ     Place 1 patch onto the skin every 24 hours        NICOTINE POLACRILEX MT      Take 4 mg by mouth        simvastatin 20 MG tablet    ZOCOR    90 tablet    TAKE 1 TABLET BY MOUTH AT BEDTIME    Mixed hyperlipidemia       venlafaxine 75 MG tablet    EFFEXOR    180 tablet    Take 1 tablet (75 mg) by mouth 2 times daily    Moderate episode of recurrent major depressive disorder (H)

## 2018-02-27 NOTE — PATIENT INSTRUCTIONS
Recommendations from today's MTM visit:                                                      Smoking cessation:  Great job quitting cigarettes!  Continue nicotine patch and lozenges for 6 weeks before stepping down.    Diabetes:    1.  Sent a new prescription for higher concentration of Tresiba 200 units/ml.  2.  Decrease Tresiba to 46-48 units daily to see if it helps with feelings of low blood sugar.   3.  Sent a prescription for 3 pens of Victoza for the 1.8mg once daily dose.   4.  Due for hemogloblin A1C and lipid panel next month    Vitamin D deficiency: Due for vitamin D levels next month    Next MTM/pharmacist visit: 1 month (March) with MTM and Patricia Chan CNP    To schedule another MTM appointment, please call the clinic directly or you may call the MTM scheduling line at [phone] or toll-free at [phone].     My Clinical Pharmacist's contact information:                                                      It was a pleasure seeing you today!  Please feel free to contact me with any questions or concerns you have.      Katlin Rosado, PharmD [facility]  Medication Therapy Management Practitioner   #[phone]    You may receive a survey about the MTM services you received.  I would appreciate your feedback to help me serve you better in the future. Please fill it out and return it when you can. Your comments will be anonymous.

## 2018-02-27 NOTE — PROGRESS NOTES
SUBJECTIVE/OBJECTIVE:                Yuki Boss is a 52 year old female called for a follow-up visit for Medication Therapy Management.  She was referred to me from Patricia Chan CNP.     Chief Complaint: Follow up from our visit on 1/16/2018.  Smoking cessation, Victoza, concern for cost    Personal Healthcare Goals: Weight loss    Tobacco: History of tobacco dependence - quit 2/24/2018  Alcohol: not currently using    Medication Adherence: Patient is worried about cost of Victoza, she only received 2 pens per box versus 3 pens per box last refill she said this costs her $39 per prescription    Smoking Cessation: On the patch 21mg and lozenges 4mg. No complaints of taste. Started this Sat/Sun. Keeps some cigarettes at home still. Has a stress ball, but doesn't help due to hand numbness. Patient feels calmer since stopping. Patient feels she doesn't need any further assistance from MTM for this at this time.     Knee pain: Patient reports acetaminophen 1000mg up to 4 times a day and aspercreme QID didn't work for knee pain. Reports pain is better with warmer weather. She is not currently taking medications.    Diabetes:  Currently taking metformin 1000 mg PO BID, glipizide 10mg at dinner, Tresiba 50 units qd, Victoza 1.8mg once daily.  Patient says Victoza's expensive and may not be helping with wt loss anymore ($39). Although then she thinks she wants to continue to decrease in appetite/appetite suppression that she is noticing.  Patient needs Victoza refill. Pt was previously on pioglitazone before starting the Victoza. She was taken off the pioglitazone because of lower leg edema and weight gain. Pt reports no weight change recently.     SMBG:  Date FBG/ 2hours post Lunch/2hours post Dinner /2hours post    2/27 114      2/26 160 (didn't take tresiba)      2/23   92 befor dinner     134      2/18 124      96, 101, 103 before dinner readings      Lab Results   Component Value Date    A1C 7.3 12/04/2017     A1C 8.0 08/28/2017    A1C 8.9 03/14/2017    A1C 8.5 10/11/2016    A1C 11.1 06/13/2016     Symptoms of low blood sugar? shaky, dizzy, sick when her BG hit 92; Felt shaky when starting 50 units of Tresiba and glipizide at dinner.  Frequency of hypoglycemia? weekly  Recent symptoms of high blood sugar? none  Exercise: Limited due to knee pain.   Cereal for breakfast, ham bun for lunch,   Largest meal of the day is dinner.     Wt Readings from Last 4 Encounters:   12/04/17 (!) 337 lb 12.8 oz (153.2 kg)   11/14/17 (!) 335 lb 12.8 oz (152.3 kg)   10/10/17 (!) 346 lb 12.8 oz (157.3 kg)   09/12/17 (!) 357 lb 8 oz (162.2 kg)     Vitamin D deficiency: taking vitamin D 5000 IU QD. Was started because her vitamin D level was low - thinks it was around 13 ng/mL; level not available through [place] records. Due for a vitamin D level. Patient asked about getting another vitamin B12 level, but her last lab was 315 pg/mL (Ref 193-986pg/mL) on 5/24/17.     Multiple sclerosis: Pt has an appointment to see neurologist in April  Diagnosed in the past year.  Just saw neurologist in early but wanted to see different neurologist.  Copaxone for treatment has been recommended she is not currently taking this.      Hyperlipidemia: Current therapy includes simvastatin 20mg once daily.  Pt reports no significant myalgias or other side effects.     Current labs include:  BP Readings from Last 3 Encounters:   12/04/17 124/82   11/14/17 136/82   10/10/17 122/73     Today's Vitals: There were no vitals taken for this visit. telemed visit  Lab Results   Component Value Date    A1C 7.3 12/04/2017   .  Lab Results   Component Value Date    CHOL 166 03/14/2017     Lab Results   Component Value Date    TRIG 235 03/21/2016     Lab Results   Component Value Date    HDL 51 03/21/2016     Lab Results   Component Value Date    LDL 94 03/14/2017    LDL 65 03/21/2016       Liver Function Studies -   Recent Labs   Lab Test  12/04/17   1003   PROTTOTAL  6.9    ALBUMIN  3.3*   BILITOTAL  0.2   ALKPHOS  73   AST  20   ALT  27       Lab Results   Component Value Date    UCRR 157 08/28/2017    MICROL 12 08/28/2017    UMALCR 7.58 08/28/2017       Last Basic Metabolic Panel:  Lab Results   Component Value Date     12/04/2017      Lab Results   Component Value Date    POTASSIUM 4.3 12/04/2017     Lab Results   Component Value Date    CHLORIDE 102 12/04/2017     Lab Results   Component Value Date    BUN 5 12/04/2017     Lab Results   Component Value Date    CR 0.63 12/04/2017     GFR Estimate   Date Value Ref Range Status   12/04/2017 >90 >60 mL/min/1.7m2 Final     Comment:     Non  GFR Calc   05/24/2017 >90  Non  GFR Calc   >60 mL/min/1.7m2 Final   03/14/2017 >90  Non  GFR Calc   >60 mL/min/1.7m2 Final     GFR Estimate If Black   Date Value Ref Range Status   12/04/2017 >90 >60 mL/min/1.7m2 Final     Comment:      GFR Calc   05/24/2017 >90   GFR Calc   >60 mL/min/1.7m2 Final   03/14/2017 >90   GFR Calc   >60 mL/min/1.7m2 Final     TSH   Date Value Ref Range Status   05/24/2017 0.85 0.40 - 4.00 mU/L Final   ]    Most Recent Immunizations   Administered Date(s) Administered     Influenza (H1N1) 12/17/2009     Influenza (IIV3) PF 12/17/2009     Influenza Vaccine IM 3yrs+ 4 Valent IIV4 08/31/2017     Pneumococcal 23 valent 09/03/2010     TDAP Vaccine (Adacel) 07/17/2009     Tetanus 10/02/1967       ASSESSMENT:              Current medications were reviewed today as discussed above.      Medication Adherence: needs improvement - see below    Diabetes: Needs Improvement. Patient is not meeting A1c goal of < 7%. Pt would benefit from Basal Insulin (Tresiba) :  decrease dose to 46-48 units to reduce hypoglycemia symptoms (pt feels better if blood sugars are >100 mg/dL).  Continue on Victoza for now, pt to see what the cost is for the higher dose prescription.  Pt is due for labs in 1  month    Hyperlipidemia: Needs Improvement. Pt will be due for fasting labs in 1 month.    Smoking cessation: Improved. Pt is smoke free.    Knee pain: partially improved.  Encouraged patient to discuss with her provider at next visit.    Vitamin D deficiency: needs improvement.  Due for vitamin D recheck to determine appropriate vitamin D dosing     Multiple sclerosis:  Needs improvement.  Pt would benefit from seeing neurology.    PLAN:                  Smoking Cessation:  Great job quitting cigarettes! Quit date 2/24.  Continue nicotine patch and lozenges for 6 weeks before stepping down.    Diabetes:    1.  Sent a new prescription for higher concentration of Tresiba 200 units/ml.  2.  Decrease Tresiba to 46-48 units daily  3.  Sent a prescription for 3 pens of Victoza for the 1.8mg once daily dose.   4.  Due for hemogloblin A1C and lipid panel next month-orders placed    Vitamin D deficiency: Due for vitamin D levels next month, order previously placed    MS: Pt plans on seeing neurology in April    Next MTM/pharmacist visit: 1 month (March) with MTM and Patricia Chan CNP    I spent 20 minutes with this patient today. All changes were made via collaborative practice agreement with Patricia Chan Ra. A copy of the visit note was provided to the patient's primary care provider.     The patient was given a summary of these recommendations as an after visit summary.    Katlin Rosado, PharmD [facility]  Medication Therapy Management Practitioner   #[phone]

## 2018-03-12 DIAGNOSIS — E78.2 MIXED HYPERLIPIDEMIA: ICD-10-CM

## 2018-03-12 NOTE — TELEPHONE ENCOUNTER
"Requested Prescriptions   Pending Prescriptions Disp Refills     simvastatin (ZOCOR) 20 MG tablet [Pharmacy Med Name: SIMVASTATIN 20 MG TABLET]  Last Written Prescription Date:  6/21/17  Last Fill Quantity: 90,  # refills: 2   Last office visit: 12/4/2017 with prescribing provider:  12/4/2017     Future Office Visit:   Next 5 appointments (look out 90 days)     Mar 23, 2018 10:40 AM CDT   Office Visit with MICKEY Aguiar Ra, CNP   [facility]    [address] [facility] [address] [address]   [phone]            Apr 10, 2018 11:00 AM CDT   Office Visit with Katlin Rosado [facility] ([facility])    [address] [facility] [address] [address]   [phone]                  90 tablet 2     Sig: TAKE 1 TABLET BY MOUTH AT BEDTIME    Statins Protocol Passed    3/12/2018  9:08 AM       Passed - LDL on file in past 12 months    Recent Labs   Lab Test  03/14/17   1515   LDL  94            Passed - No abnormal creatine kinase in past 12 months    No lab results found.         Passed - Recent (12 mo) or future (30 days) visit within the authorizing provider's specialty    Patient had office visit in the last 12 months or has a visit in the next 30 days with authorizing provider or within the authorizing provider's specialty.  See \"Patient Info\" tab in inbasket, or \"Choose Columns\" in Meds & Orders section of the refill encounter.           Passed - Patient is age 18 or older       Passed - No active pregnancy on record       Passed - No positive pregnancy test in past 12 months          "

## 2018-03-14 RX ORDER — SIMVASTATIN 20 MG
TABLET ORAL
Qty: 90 TABLET | Refills: 2 | Status: SHIPPED | OUTPATIENT
Start: 2018-03-14 | End: 2018-10-03

## 2018-03-14 NOTE — TELEPHONE ENCOUNTER
Medication is being filled for 1 time refill only due to:  Patient needs to be seen because due for fasting labs.     OV scheduled.     Prescription approved per [facility] Refill Protocol.    Anyi HERNANDEZ RN, BSN, PHN  [place] [facility] RN

## 2018-03-15 DIAGNOSIS — F33.1 MODERATE EPISODE OF RECURRENT MAJOR DEPRESSIVE DISORDER (H): ICD-10-CM

## 2018-03-15 DIAGNOSIS — E11.65 TYPE 2 DIABETES MELLITUS WITH HYPERGLYCEMIA, WITHOUT LONG-TERM CURRENT USE OF INSULIN (H): ICD-10-CM

## 2018-03-16 NOTE — TELEPHONE ENCOUNTER
"Requested Prescriptions   Pending Prescriptions Disp Refills     lisinopril-hydrochlorothiazide (PRINZIDE/ZESTORETIC) 10-12.5 MG per tablet [Pharmacy Med Name: LISINOPRIL-HCTZ 10-12.5 MG TAB]  Last Written Prescription Date:  10/16/17  Last Fill Quantity: 90,  # refills: 1   Last office visit: 12/4/2017 with prescribing provider:  12/4/2017     Future Office Visit:   Next 5 appointments (look out 90 days)     Mar 23, 2018 10:40 AM CDT   Office Visit with MICKEY Aguiar Ra [facility] ([facility])    [address] [address] [phone]            Apr 10, 2018 11:00 AM CDT   Office Visit with Katlin Rosado [facility] ([facility])    [address] [facility] [address] [phone]                  90 tablet 1     Sig: TAKE 1 TABLET BY MOUTH DAILY    Diuretics (Including Combos) Protocol Passed    3/15/2018  9:37 PM       Passed - Blood pressure under 140/90 in past 12 months    BP Readings from Last 3 Encounters:   12/04/17 124/82   11/14/17 136/82   10/10/17 122/73                Passed - Recent (12 mo) or future (30 days) visit within the authorizing provider's specialty    Patient had office visit in the last 12 months or has a visit in the next 30 days with authorizing provider or within the authorizing provider's specialty.  See \"Patient Info\" tab in inbasket, or \"Choose Columns\" in Meds & Orders section of the refill encounter.           Passed - Patient is age 18 or older       Passed - No active pregancy on record       Passed - Normal serum creatinine on file in past 12 months    Recent Labs   Lab Test  12/04/17   1003   CR  0.63             Passed - Normal serum potassium on file in past 12 months    Recent Labs   Lab Test  12/04/17   1003   POTASSIUM  4.3                   Passed - Normal serum sodium on file in past 12 months    Recent Labs   Lab Test  12/04/17   1003   NA  " 136             Passed - No positive pregnancy test in past 12 months        venlafaxine (EFFEXOR) 75 MG tablet [Pharmacy Med Name: VENLAFAXINE HCL 75 MG TABLET]  Last Written Prescription Date:  10/5/17  Last Fill Quantity: 180,  # refills: 1   Last office visit: 12/4/2017 with prescribing provider:  12/4/2017     Future Office Visit:   Next 5 appointments (look out 90 days)     Mar 23, 2018 10:40 AM CDT   Office Visit with MICKEY Aguiar Ra, CNP   [facility]    [address] [address] [address]   [phone]            Apr 10, 2018 11:00 AM CDT   Office Visit with Katlin Rosado [facility] ([facility])    [address] [facility] [facility] [address]   [phone]                  180 tablet 1     Sig: TAKE 1 TABLET (75 MG) BY MOUTH 2 TIMES DAILY    Serotonin-Norepinephrine Reuptake Inhibitors  Passed    3/15/2018  9:37 PM       Passed - Blood pressure under 140/90 in past 12 months    BP Readings from Last 3 Encounters:   12/04/17 124/82   11/14/17 136/82   10/10/17 122/73                Passed - PHQ-9 score of less than 5 in past 6 months    The PHQ-9 criteria is meant to fail. It requires a PHQ-9 score review  PHQ-9 SCORE 7/17/2009 3/14/2017 10/4/2017   Total Score 18 - -   Total Score MyChart - - 3 (Minimal depression)   Total Score - 6 3     TONY-7 SCORE 3/14/2017   Total Score 7              Passed - Patient is age 18 or older       Passed - No active pregnancy on record       Passed - Normal serum creatinine on file in past 12 months    Recent Labs   Lab Test  12/04/17   1003   CR  0.63            Passed - No positive pregnancy test in past 12 months       Passed - Recent (6 mo) or future (30 days) visit within the authorizing provider's specialty    Patient had office visit in the last 6 months or has a visit in the next 30 days with authorizing provider or within the authorizing provider's  "specialty.  See \"Patient Info\" tab in inbasket, or \"Choose Columns\" in Meds & Orders section of the refill encounter.              "

## 2018-03-20 DIAGNOSIS — E55.9 VITAMIN D DEFICIENCY: ICD-10-CM

## 2018-03-20 DIAGNOSIS — E11.65 TYPE 2 DIABETES MELLITUS WITH HYPERGLYCEMIA, WITHOUT LONG-TERM CURRENT USE OF INSULIN (H): ICD-10-CM

## 2018-03-20 DIAGNOSIS — Z13.6 CARDIOVASCULAR SCREENING; LDL GOAL LESS THAN 130: ICD-10-CM

## 2018-03-20 LAB
CHOLEST SERPL-MCNC: 162 MG/DL
DEPRECATED CALCIDIOL+CALCIFEROL SERPL-MC: 54 UG/L (ref 20–75)
HBA1C MFR BLD: 6.8 % (ref 4.3–6)
HDLC SERPL-MCNC: 46 MG/DL
LDLC SERPL CALC-MCNC: 73 MG/DL
NONHDLC SERPL-MCNC: 116 MG/DL
TRIGL SERPL-MCNC: 216 MG/DL

## 2018-03-20 PROCEDURE — 83036 HEMOGLOBIN GLYCOSYLATED A1C: CPT | Performed by: NURSE PRACTITIONER

## 2018-03-20 PROCEDURE — 36415 COLL VENOUS BLD VENIPUNCTURE: CPT | Performed by: NURSE PRACTITIONER

## 2018-03-20 PROCEDURE — 82306 VITAMIN D 25 HYDROXY: CPT | Performed by: NURSE PRACTITIONER

## 2018-03-20 PROCEDURE — 80061 LIPID PANEL: CPT | Performed by: NURSE PRACTITIONER

## 2018-03-20 RX ORDER — VENLAFAXINE 75 MG/1
TABLET ORAL
Qty: 180 TABLET | Refills: 1 | Status: SHIPPED | OUTPATIENT
Start: 2018-03-20 | End: 2018-10-22

## 2018-03-20 RX ORDER — LISINOPRIL/HYDROCHLOROTHIAZIDE 10-12.5 MG
TABLET ORAL
Qty: 90 TABLET | Refills: 1 | Status: SHIPPED | OUTPATIENT
Start: 2018-03-20 | End: 2018-10-09

## 2018-04-05 ENCOUNTER — TELEPHONE (OUTPATIENT)
Dept: FAMILY MEDICINE | Facility: CLINIC | Age: 53
End: 2018-04-05

## 2018-04-05 NOTE — TELEPHONE ENCOUNTER
Panel Management Review      Patient has the following on her problem list: None      Composite cancer screening  Chart review shows that this patient is due/due soon for the following Mammogram  Summary:    Patient is due/failing the following:   MAMMOGRAM    Action needed:   Patient needs referral/order: Mammogram    Type of outreach:    Sent Zite message.    Questions for provider review:    None                                                                                                                                    Katlin Chacon CMA       Chart routed to Care Team .

## 2018-04-10 ENCOUNTER — OFFICE VISIT (OUTPATIENT)
Dept: PHARMACY | Facility: CLINIC | Age: 53
End: 2018-04-10
Payer: COMMERCIAL

## 2018-04-10 VITALS
HEART RATE: 86 BPM | SYSTOLIC BLOOD PRESSURE: 129 MMHG | DIASTOLIC BLOOD PRESSURE: 79 MMHG | WEIGHT: 293 LBS | BODY MASS INDEX: 54.33 KG/M2

## 2018-04-10 DIAGNOSIS — E11.65 TYPE 2 DIABETES MELLITUS WITH HYPERGLYCEMIA, WITHOUT LONG-TERM CURRENT USE OF INSULIN (H): ICD-10-CM

## 2018-04-10 DIAGNOSIS — E55.9 VITAMIN D DEFICIENCY: ICD-10-CM

## 2018-04-10 DIAGNOSIS — F33.1 MODERATE EPISODE OF RECURRENT MAJOR DEPRESSIVE DISORDER (H): ICD-10-CM

## 2018-04-10 DIAGNOSIS — M19.90 ARTHRITIS PAIN: ICD-10-CM

## 2018-04-10 DIAGNOSIS — G35 MS (MULTIPLE SCLEROSIS) (H): ICD-10-CM

## 2018-04-10 DIAGNOSIS — Z72.0 TOBACCO ABUSE: Primary | ICD-10-CM

## 2018-04-10 PROCEDURE — 99607 MTMS BY PHARM ADDL 15 MIN: CPT | Performed by: PHARMACIST

## 2018-04-10 PROCEDURE — 99606 MTMS BY PHARM EST 15 MIN: CPT | Performed by: PHARMACIST

## 2018-04-10 RX ORDER — GLIPIZIDE 5 MG/1
TABLET ORAL
Qty: 90 TABLET | Refills: 2
Start: 2018-04-10 | End: 2018-08-20

## 2018-04-10 NOTE — PATIENT INSTRUCTIONS
Recommendations from today's MTM visit:                                                      Smoking Cessation: consider in 6 weeks moving to the 2mg lozenges, great job on not smoking    Diabetes:      1.  Increase Tresiba to 52 units once daily and increase by 4 units every week for AM blood sugars >130 mg/dL until max dose of 64 units per day.    2.  If afternoon blood sugars decrease than stop glipizide in the AM.    3.  Aim to use pool every day    Cost of medications in the future if you decide to take medication for Multiple Sclerosis--can think about Relion NPH insulin at [facility], $25 per vial    See neurologist.    Next MTM/pharmacist visit: as needed    To schedule another MTM appointment, please call the clinic directly or you may call the MTM scheduling line at [phone] or toll-free at [phone].     My Clinical Pharmacist's contact information:                                                      It was a pleasure seeing you today!  Please feel free to contact me with any questions or concerns you have.      Katlin Rosado, PharmD [facility]  Medication Therapy Management Practitioner   #[phone]    You may receive a survey about the MTM services you received.  I would appreciate your feedback to help me serve you better in the future. Please fill it out and return it when you can. Your comments will be anonymous.      My healthcare goals:                                                      Diabetes Goals:    Home Monitoring of Blood Sugars:Fasting  mg/dL and 2 hours after a meal less than 180 mg/dL.    Hemoglobin A1C: Less than 7%. Yours is   Lab Results   Component Value Date    A1C 6.8 03/20/2018   .    Blood Pressure: Less than 140/90mmHg. Yours is Wt (!) 346 lb 14.4 oz (157.4 kg)  BMI 54.33 kg/m2.    Cholesterol: You are taking simvastatin to help decrease the risk of heart disease.    Things you can do to help lower your blood sugars:    Diet: 3 meals and 1-2 snacks per day with  45-60 grams of carbohydrates per meal and 15-30 grams of carbohydrates per snack. Try to fill your plate at least half-full with vegetables, fill one-quarter full with lean meats or protein, and also make sure you get at least some carbohydrate with every meal.    Exercise: 30 minutes per day of anything that will increase your heart rate and make you break a sweat! Gardening, walking, cleaning the house, changing the oil in your car, etc. If you feel like 30 minutes per day is too much, start small. Even lifting canned foods or working your arms with a resistance band in front of the TV can help.

## 2018-04-10 NOTE — MR AVS SNAPSHOT
After Visit Summary   4/10/2018    Yuki Boss    MRN: [id number]           Patient Information     Date Of Birth          1965        Visit Information        Provider Department      4/10/2018 11:00 AM Katlin Rosado, [facility] MTM        Today's Diagnoses     Type 2 diabetes mellitus with hyperglycemia, without long-term current use of insulin (H)          Care Instructions    Recommendations from today's MTM visit:                                                      Smoking Cessation: consider in 6 weeks moving to the [place] lozenges, great job on not smoking    Diabetes:      1.  Increase Tresiba to 52 units once daily and increase by 4 units every week for AM blood sugars >130 mg/dL until max dose of 64 units per day.    2.  If afternoon blood sugars decrease than stop glipizide in the AM.    3.  Aim to use pool every day    Cost of medications in the future if you decide to take medication for Multiple Sclerosis--can think about Relion NPH insulin at [facility], $25 per vial    See neurologist.    Next MTM/pharmacist visit: as needed    To schedule another MTM appointment, please call the clinic directly or you may call the MTM scheduling line at [phone] or toll-free at [phone].     My Clinical Pharmacist's contact information:                                                      It was a pleasure seeing you today!  Please feel free to contact me with any questions or concerns you have.      Katlin Rosado, PharmD [facility]  Medication Therapy Management Practitioner   #[phone]    You may receive a survey about the MTM services you received.  I would appreciate your feedback to help me serve you better in the future. Please fill it out and return it when you can. Your comments will be anonymous.      My healthcare goals:                                                      Diabetes Goals:    Home Monitoring of Blood Sugars:Fasting  mg/dL and  2 hours after a meal less than 180 mg/dL.    Hemoglobin A1C: Less than 7%. Yours is   Lab Results   Component Value Date    A1C 6.8 03/20/2018   .    Blood Pressure: Less than 140/90mmHg. Yours is Wt (!) 346 lb 14.4 oz (157.4 kg)  BMI 54.33 kg/m2.    Cholesterol: You are taking simvastatin to help decrease the risk of heart disease.    Things you can do to help lower your blood sugars:    Diet: 3 meals and 1-2 snacks per day with 45-60 grams of carbohydrates per meal and 15-30 grams of carbohydrates per snack. Try to fill your plate at least half-full with vegetables, fill one-quarter full with lean meats or protein, and also make sure you get at least some carbohydrate with every meal.    Exercise: 30 minutes per day of anything that will increase your heart rate and make you break a sweat! Gardening, walking, cleaning the house, changing the oil in your car, etc. If you feel like 30 minutes per day is too much, start small. Even lifting canned foods or working your arms with a resistance band in front of the TV can help.                    Follow-ups after your visit        Who to contact     If you have questions or need follow up information about today's clinic visit or your schedule please contact [facility] directly at [phone].  Normal or non-critical lab and imaging results will be communicated to you by [employer], letter or phone within 4 business days after the clinic has received the results. If you do not hear from us within 7 days, please contact the clinic through [employer] or phone. If you have a critical or abnormal lab result, we will notify you by phone as soon as possible.  Submit refill requests through [employer] or call your pharmacy and they will forward the refill request to us. Please allow 3 business days for your refill to be completed.          Additional Information About Your Visit        [employer] Information     [employer] gives you secure access to your electronic health  record. If you see a primary care provider, you can also send messages to your care team and make appointments. If you have questions, please call your primary care clinic.  If you do not have a primary care provider, please call [phone] and they will assist you.        Care EveryWhere ID     This is your Care EveryWhere ID. This could be used by other organizations to access your [facility] medical records  [id number]        Your Vitals Were     Pulse BMI (Body Mass Index)                86 54.33 kg/m2           Blood Pressure from Last 3 Encounters:   04/10/18 129/79   12/04/17 124/82   11/14/17 136/82    Weight from Last 3 Encounters:   04/10/18 (!) 346 lb 14.4 oz (157.4 kg)   12/04/17 (!) 337 lb 12.8 oz (153.2 kg)   11/14/17 (!) 335 lb 12.8 oz (152.3 kg)              Today, you had the following     No orders found for display         Today's Medication Changes          These changes are accurate as of 4/10/18 11:51 AM.  If you have any questions, ask your nurse or doctor.               These medicines have changed or have updated prescriptions.        Dose/Directions    glipiZIDE 5 MG tablet   Commonly known as:  GLUCOTROL   This may have changed:  additional instructions   Used for:  Type 2 diabetes mellitus with hyperglycemia, without long-term current use of insulin (H)        Take 1 tablet at breakfast and  2 tablets before dinner   Quantity:  90 tablet   Refills:  2       insulin degludec 200 UNIT/ML pen   Commonly known as:  TRESIBA   This may have changed:    - how much to take  - additional instructions   Used for:  Type 2 diabetes mellitus with hyperglycemia, without long-term current use of insulin (H)        Dose:  52 Units   Inject 52 Units Subcutaneous daily Increase by 4 units every week for AM BS>130mg/dL max dose 64 units   Quantity:  12 mL   Refills:  2       insulin pen needle 31G X 5 MM   This may have changed:  additional instructions   Used for:  Type 2 diabetes mellitus with  hyperglycemia, without long-term current use of insulin (H)        See Admin Instructions Use 1 pen needles daily or as directed.   Quantity:  100 each   Refills:  3       VITAMIN D (CHOLECALCIFEROL) PO   This may have changed:  Another medication with the same name was removed. Continue taking this medication, and follow the directions you see here.        Dose:  1000 Units   Take 1,000 Units by mouth daily   Refills:  0         Stop taking these medicines if you haven't already. Please contact your care team if you have questions.     liraglutide 18 MG/3ML soln   Commonly known as:  VICTOZA PEN                Where to get your medicines      These medications were sent to [facility]/pharmacy #[id number] - [place], [place] - [address]  [address] [place], [facility] [address]     Phone:  [phone]     insulin degludec 200 UNIT/ML pen    insulin pen needle 31G X 5 MM    metFORMIN 1000 MG tablet         Some of these will need a paper prescription and others can be bought over the counter.  Ask your nurse if you have questions.     You don't need a prescription for these medications     glipiZIDE 5 MG tablet                Primary Care Provider Office Phone # Fax #    Patricia Ephraim Chan, APRN [facility] [phone]       [address] [facility] [address]        Equal Access to Services     NELSY SHORT AH: Hadii cherry siu hadasho Soomaali, waaxda luqadaha, qaybta kaalmada adeegyada, cynthia mendez. So [facility] [phone].    ATENCIÓN: Si habla español, tiene a rose disposición servicios gratuitos de asistencia lingüística. Soila al [phone].    We comply with applicable federal civil rights laws and [place] laws. We do not discriminate on the basis of race, color, national origin, age, disability, sex, sexual orientation, or gender identity.            Thank you!     Thank you for choosing [facility]  for your care. Our goal is always to provide you with excellent  care. Hearing back from our patients is one way we can continue to improve our services. Please take a few minutes to complete the written survey that you may receive in the mail after your visit with us. Thank you!             Your Updated Medication List - Protect others around you: Learn how to safely use, store and throw away your medicines at www.disposemymeds.org.          This list is accurate as of 4/10/18 11:51 AM.  Always use your most recent med list.                   Brand Name Dispense Instructions for use Diagnosis    ACETAMINOPHEN PO      Take 500-1,000 mg by mouth every 8 hours as needed for pain        aspirin 81 MG tablet      Take by mouth daily        blood glucose lancets standard    no brand specified    3 Box    Use to test blood sugar once daily or as directed.    Type 2 diabetes mellitus with hyperglycemia, without long-term current use of insulin (H)       blood glucose monitoring test strip    no brand specified    100 strip    Use to test blood sugars one time daily or as directed    Type 2 diabetes mellitus with hyperglycemia, without long-term current use of insulin (H)       cetirizine 10 MG tablet    zyrTEC     Take 10 mg by mouth daily        fluticasone 50 MCG/ACT spray    FLONASE    1 Bottle    Spray 1-2 sprays into both nostrils daily    Allergic rhinitis due to animal hair and dander, unspecified chronicity       glipiZIDE 5 MG tablet    GLUCOTROL    90 tablet    Take 1 tablet at breakfast and  2 tablets before dinner    Type 2 diabetes mellitus with hyperglycemia, without long-term current use of insulin (H)       insulin degludec 200 UNIT/ML pen    TRESIBA    12 mL    Inject 52 Units Subcutaneous daily Increase by 4 units every week for AM BS>130mg/dL max dose 64 units    Type 2 diabetes mellitus with hyperglycemia, without long-term current use of insulin (H)       insulin pen needle 31G X 5 MM     100 each    See Admin Instructions Use 1 pen needles daily or as directed.     Type 2 diabetes mellitus with hyperglycemia, without long-term current use of insulin (H)       lisinopril-hydrochlorothiazide 10-12.5 MG per tablet    PRINZIDE/ZESTORETIC    90 tablet    TAKE 1 TABLET BY MOUTH DAILY    Type 2 diabetes mellitus with hyperglycemia, without long-term current use of insulin (H)       metFORMIN 1000 MG tablet    GLUCOPHAGE    180 tablet    Take 1 tablet (1,000 mg) by mouth 2 times daily (with meals)    Type 2 diabetes mellitus with hyperglycemia, without long-term current use of insulin (H)       NICOTINE POLACRILEX MT      Take 4 mg by mouth Take 1/4 tablet several times a day        simvastatin 20 MG tablet    ZOCOR    90 tablet    TAKE 1 TABLET BY MOUTH AT BEDTIME    Mixed hyperlipidemia       venlafaxine 75 MG tablet    EFFEXOR    180 tablet    TAKE 1 TABLET (75 MG) BY MOUTH 2 TIMES DAILY    Moderate episode of recurrent major depressive disorder (H)       VITAMIN D (CHOLECALCIFEROL) PO      Take 1,000 Units by mouth daily

## 2018-04-10 NOTE — PROGRESS NOTES
SUBJECTIVE/OBJECTIVE:                Yuki Boss is a 53 year old female coming in for a follow-up visit for Medication Therapy Management.  She was referred to me from Patricia Chan NP.     Chief Complaint: Follow up from our visit on 2/27/2018.  Reports needing to go off Victoza due to $240 was paying $39.  [place] .    Tobacco: No tobacco use  Alcohol: not currently using    Medication Adherence/Access:  Medication barriers: affording medications.     Diabetes:  Currently taking metformin 1000 mg PO BID, glipizide 5mg at breakfast (started today)  10mg at dinner, Tresiba 48 units qd.  Patient says Victoza's expensive and stopped medication several weeks ago.  Pt was previously on pioglitazone before starting the Victoza. She was taken off the pioglitazone because of lower leg edema and weight gain. Pt reports weight gain after stopping the Victoza and with smoking cessation    Lab Results   Component Value Date    A1C 6.8 03/20/2018    A1C 7.3 12/04/2017    A1C 8.0 08/28/2017    A1C 8.9 03/14/2017    A1C 8.5 10/11/2016       Symptoms of low blood sugar? shaky, dizzy, sick    Frequency of hypoglycemia? None since stopping Victoza  Recent symptoms of high blood sugar? none  Exercise: Limited due to knee pain.   Breakfast: cheese omelette  Lunch:  Meals that  got, leftovers or ham and cheese and bun  Dinner:  Largest meal of the day is dinner.   Wt Readings from Last 4 Encounters:   04/10/18 (!) 346 lb 14.4 oz (157.4 kg)   12/04/17 (!) 337 lb 12.8 oz (153.2 kg)   11/14/17 (!) 335 lb 12.8 oz (152.3 kg)   10/10/17 (!) 346 lb 12.8 oz (157.3 kg)     Smoking Cessation: Not on patch.  Current medication lozenges 4mg as needed and cutting 1/4 and taking about 3 per day.  Feeling change in environment will help.  It will help with triggers.  She is not struggling with anything at this time.    Knee/back pain: Patient reports acetaminophen 1000mg up to 3 times a day.   Reports no side effects.    Vitamin D deficiency: taking vitamin D 1000 IU QD. Was started because her vitamin D level was low - thinks it was around 13 ng/mL and recent level done and therefore she decreased her vitamin D dose.     Component      Latest Ref Rng & Units 3/20/2018   Vitamin D Deficiency screening      20 - 75 ug/L 54     Multiple sclerosis: Pt is hesitant to see neurologist.  She wants to know about CBD.  Has 20 symptoms but do not last very long.  Fearful of cost of medications and how it affects affording other medications.  She really wants to work on stress.     Depression:  Current medications include: Venlafaxine 75mg twice daily. Pt reports that depression symptoms are improved.  She has been working on controlling her stress and this has helped.  PHQ-9 SCORE 7/17/2009 3/14/2017 10/4/2017   Total Score 18 - -   Total Score MyChart - - 3 (Minimal depression)   Total Score - 6 3       Current labs include:  BP Readings from Last 3 Encounters:   12/04/17 124/82   11/14/17 136/82   10/10/17 122/73     Today's Vitals: /79  Pulse 86  Wt (!) 346 lb 14.4 oz (157.4 kg)  BMI 54.33 kg/m2  Lab Results   Component Value Date    A1C 6.8 03/20/2018   .  Lab Results   Component Value Date    CHOL 162 03/20/2018     Lab Results   Component Value Date    TRIG 216 03/20/2018     Lab Results   Component Value Date    HDL 46 03/20/2018     Lab Results   Component Value Date    LDL 73 03/20/2018       Liver Function Studies -   Recent Labs   Lab Test  12/04/17   1003   PROTTOTAL  6.9   ALBUMIN  3.3*   BILITOTAL  0.2   ALKPHOS  73   AST  20   ALT  27       Lab Results   Component Value Date    UCRR 157 08/28/2017    MICROL 12 08/28/2017    UMALCR 7.58 08/28/2017       Last Basic Metabolic Panel:  Lab Results   Component Value Date     12/04/2017      Lab Results   Component Value Date    POTASSIUM 4.3 12/04/2017     Lab Results   Component Value Date    CHLORIDE 102 12/04/2017     Lab Results    Component Value Date    BUN 5 12/04/2017     Lab Results   Component Value Date    CR 0.63 12/04/2017     GFR Estimate   Date Value Ref Range Status   12/04/2017 >90 >60 mL/min/1.7m2 Final     Comment:     Non  GFR Calc   05/24/2017 >90  Non  GFR Calc   >60 mL/min/1.7m2 Final   03/14/2017 >90  Non  GFR Calc   >60 mL/min/1.7m2 Final     GFR Estimate If Black   Date Value Ref Range Status   12/04/2017 >90 >60 mL/min/1.7m2 Final     Comment:      GFR Calc   05/24/2017 >90   GFR Calc   >60 mL/min/1.7m2 Final   03/14/2017 >90   GFR Calc   >60 mL/min/1.7m2 Final     TSH   Date Value Ref Range Status   05/24/2017 0.85 0.40 - 4.00 mU/L Final   ]    Most Recent Immunizations   Administered Date(s) Administered     Influenza (H1N1) 12/17/2009     Influenza (IIV3) PF 12/17/2009     Influenza Vaccine IM 3yrs+ 4 Valent IIV4 08/31/2017     Pneumococcal 23 valent 09/03/2010     TDAP Vaccine (Adacel) 07/17/2009     Tetanus 10/02/1967       ASSESSMENT:              Current medications were reviewed today as discussed above.      Medication Adherence: good, issues with cost of medications    Diabetes: Needs Improvement. Patient is meeting A1c goal of < 7%. Self monitoring of blood glucose is not at goal of fasting  mg/dL. Pt would benefit from Basal Insulin (Tresiba) :  increase dose to 52 units once daily  SFU (glipizide) :  increase dose to 5mg in the morning.  Increase exercise.    Smoking cessation: Needs Improvement. Pt is smoke free. We discussed: weaning off nicotine lozenges in the future.    Knee/back pain: unimproved    Vitamin D deficiency: stable, agree with current vitamin D dose    Multiple sclerosis: needs improvement.  Encouraged patient to make an appointment with neurologist.  Discussed CBD oil which [employer] has insufficient evidence for use in MS.    Depression: stable    PLAN:                  Smoking  Cessation: decrease to 2mg lozenges in 6 weeks, great job on not smoking    Diabetes:      1.  Increase Tresiba to 52 units once daily and increase by 4 units every week for AM blood sugars >130 mg/dL until max dose of 64 units per day.    2.  If afternoon blood sugars decrease than stop glipizide in the AM.    3.  Aim to use pool every day    Cost of medications in the future if you decide to take medication for Multiple Sclerosis--can think about Relion NPH insulin at [facility], $25 per vial    Multiple Sclerosis:  Pt to make an appointment with neurologist.    Next MTM/pharmacist visit: as needed, patient is moving    I spent 50 minutes with this patient today. All changes were made via collaborative practice agreement with Patricia Chan Ra. A copy of the visit note was provided to the patient's primary care provider.     The patient was given a summary of these recommendations as an after visit summary.    Katlin Rosado, PharmD [facility]  Medication Therapy Management Practitioner   #[phone]

## 2018-04-26 NOTE — TELEPHONE ENCOUNTER
Simvastatin     Last Written Prescription Date: 11/21/2016  Last Fill Quantity: 30, # refills: 1  Last Office Visit with G, P or [facility] prescribing provider: 10/11/2016       CHOL      163   3/21/2016  HDL       51   3/21/2016  LDL       65   3/21/2016  LDL       78   2/24/2015  TRIG      235   3/21/2016  CHOLHDLRATIO      3.2   3/21/2016      Kiet BULL (R)     None

## 2018-06-06 DIAGNOSIS — E11.65 TYPE 2 DIABETES MELLITUS WITH HYPERGLYCEMIA, WITHOUT LONG-TERM CURRENT USE OF INSULIN (H): ICD-10-CM

## 2018-06-06 NOTE — TELEPHONE ENCOUNTER
"PHARMACY MESSAGE: ALTERNATIVE REQUESTED. PRESCRIBER NOT ENROLLED WITH MEDICARE PART B PROGRAM. ANOTHER DR WILL HAVE TO WRITE THE SCRIPT.    PATIENT IS DUE FOR AN OFFICE VISIT.  blood glucose (NO BRAND SPECIFIED) lancets standard      Last Written Prescription Date:  6/6/18  Last Fill Quantity: 100 EACH,   # refills: 0  Last Office Visit: 12/4/17 WITH GUILLE  Future Office visit:       Routing refill request to provider for review/approval because:  Drug not on the [facility], [facility] or  Health refill protocol or controlled substance      Requested Prescriptions   Pending Prescriptions Disp Refills                       blood glucose monitoring (NO BRAND SPECIFIED) test strip  PATIENT IS DUE FOR AN OFFICE VISIT.  Last Written Prescription Date:  6/6/18  Last Fill Quantity: 100 STRIP,  # refills: 0   Last office visit: 12/4/2017 with prescribing provider:  GUILLE   Future Office Visit:     100 strip 0     Sig: Use to test blood sugars one time daily or as directed    Diabetic Supplies Protocol Failed    6/6/2018  3:40 PM       Failed - Recent (6 mo) or future (30 days) visit within the authorizing provider's specialty    Patient had office visit in the last 6 months or has a visit in the next 30 days with authorizing provider.  See \"Patient Info\" tab in inbasket, or \"Choose Columns\" in Meds & Orders section of the refill encounter.           Passed - Patient is 18 years of age or older          "

## 2018-06-12 NOTE — TELEPHONE ENCOUNTER
Will forward to Dr. Skaggs, VIVEK this a.m. To see if she will fill.  See pharmacy message below.

## 2018-06-13 NOTE — TELEPHONE ENCOUNTER
There should be no problem with me being able to prescribe these.  Can we look into this further?  LANRE

## 2018-06-13 NOTE — TELEPHONE ENCOUNTER
Pt saw PCP (Patricia Chan Ra)  6 months ago;   MTM in April.  Request for supplies should be on nurse refill protocol or sent to pharmacy.  Lab Results   Component Value Date    A1C 6.8 03/20/2018    A1C 7.3 12/04/2017      should be on protocol.

## 2018-06-13 NOTE — TELEPHONE ENCOUNTER
Called the pharmacy and spoke to Raghavendra.  It is going through now.  He is not sure why it did not before.

## 2018-06-13 NOTE — TELEPHONE ENCOUNTER
Yes, they are on the nurse protocol and I have already tried to fill them on 6/6/18, but the pharmacy states that under Patricia's name that the prescriber is not enrolled in Medicare Part B program per below.  They are requesting another doctor to write the script.  Please advise.  Thanks!    Will forward to the station again also as pt due for an appt and has not called back.

## 2018-06-27 ENCOUNTER — TRANSFERRED RECORDS (OUTPATIENT)
Dept: HEALTH INFORMATION MANAGEMENT | Facility: CLINIC | Age: 53
End: 2018-06-27

## 2018-06-27 LAB
ALT SERPL-CCNC: 28 U/L (ref 8–45)
AST SERPL-CCNC: 19 U/L (ref 5–41)
CREAT SERPL-MCNC: 0.75 MG/DL (ref 0.57–1.11)
GFR SERPL CREATININE-BSD FRML MDRD: >60 ML/MIN/1.73M2
GLUCOSE SERPL-MCNC: 318 MG/DL (ref 70–100)
HBA1C MFR BLD: 10.2 % (ref 0–5.7)
POTASSIUM SERPL-SCNC: 4.1 MMOL/L (ref 3.5–5.1)
TSH SERPL-ACNC: 0.29 UIU/ML (ref 0.47–5)

## 2018-07-13 DIAGNOSIS — E11.65 TYPE 2 DIABETES MELLITUS WITH HYPERGLYCEMIA, WITHOUT LONG-TERM CURRENT USE OF INSULIN (H): ICD-10-CM

## 2018-07-13 NOTE — TELEPHONE ENCOUNTER
"Requested Prescriptions   Pending Prescriptions Disp Refills     metFORMIN (GLUCOPHAGE) 1000 MG tablet [Pharmacy Med Name: METFORMIN HCL 1,000 MG TABLET]  Last Written Prescription Date:  4/10/18  Last Fill Quantity: 180,  # refills: 0   Last office visit: 12/4/18 with prescribing provider:   Patricia Chan Ra, APRN CNP  Future Office Visit:     180 tablet 0     Sig: TAKE 1 TABLET (1,000 MG) BY MOUTH 2 TIMES DAILY (WITH MEALS)    Biguanide Agents Passed    7/13/2018 12:58 AM       Passed - Blood pressure less than 140/90 in past 6 months    BP Readings from Last 3 Encounters:   04/10/18 129/79   12/04/17 124/82   11/14/17 136/82                Passed - Patient has documented LDL within the past 12 mos.    Recent Labs   Lab Test  03/20/18   0810   LDL  73            Passed - Patient has had a Microalbumin in the past 12 mos.    Recent Labs   Lab Test  08/28/17   1550   MICROL  12   UMALCR  7.58            Passed - Patient is age 10 or older       Passed - Patient has documented A1c within the specified period of time.    If HgbA1C is 8 or greater, it needs to be on file within the past 3 months.  If less than 8, must be on file within the past 6 months.     Recent Labs   Lab Test  03/20/18   0810   A1C  6.8*            Passed - Patient's CR is NOT>1.4 OR Patient's EGFR is NOT<45 within past 12 mos.    Recent Labs   Lab Test  12/04/17   1003   GFRESTIMATED  >90   GFRESTBLACK  >90       Recent Labs   Lab Test  12/04/17   1003   CR  0.63            Passed - Patient does NOT have a diagnosis of CHF.       Passed - Patient is not pregnant       Passed - Patient has not had a positive pregnancy test within the past 12 mos.        Passed - Recent (6 mo) or future (30 days) visit within the authorizing provider's specialty    Patient had office visit in the last 6 months or has a visit in the next 30 days with authorizing provider or within the authorizing provider's specialty.  See \"Patient Info\" tab in inbasket, or " "\"Choose Columns\" in Meds & Orders section of the refill encounter.              "

## 2018-07-17 NOTE — TELEPHONE ENCOUNTER
Routing to PCP to review. Pt saw MTM in April for Diabetes.   Please advise if you require 6 month follow up with you.     Daina Lopez RN -- [facility] Work[facility]

## 2018-08-01 ENCOUNTER — TELEPHONE (OUTPATIENT)
Dept: FAMILY MEDICINE | Facility: CLINIC | Age: 53
End: 2018-08-01

## 2018-08-01 NOTE — TELEPHONE ENCOUNTER
Panel Management Review      Patient has the following on her problem list: None      Composite cancer screening  Chart review shows that this patient is due/due soon for the following Mammogram  Summary:    Patient is due/failing the following:   MAMMOGRAM    Action needed:   Patient need to schedule Mammogram    Type of outreach:    Sent [employer] message.    Questions for provider review:    None                                                                                                                                    Katlin Chacon CMA       Chart routed to Care Team .

## 2018-08-01 NOTE — LETTER
[facility]  [id number] [facility] [address] [phone]       August 15, 2018    Yuki Boss  [address] [place] [address]  [facility] [address]    Dear Yuki,    We care about your health and have reviewed your health plan and are making recommendations based on this review, to optimize your health.    You are in particular need of attention regarding:  -Breast Cancer Screening    We are recommending that you:                                                                                                                                        -schedule a MAMMOGRAM which is due.         1 in 8 women will develop invasive breast cancer during her lifetime and it is the most common non-skin cancer in American women.  EARLY detection, new treatments, and a better understanding of the disease have increased survival rates - the 5 year survival rate in the 1960s was 63% and today it is close to 90% .      If you are under/uninsured, we recommend you contact the [facility]. They offer mammograms at no charge or on a sliding fee charge. You can schedule with them at [phone]. Please have them send us the results.          Please disregard this reminder if you have had this exam elsewhere within the last year.  It would be helpful for us to have a copy of your mammogram report in our file so that we can best coordinate your care - please contact us with when your test was done so we can update your record.    In addition, here is a list of due or overdue Health Maintenance reminders.    Health Maintenance Due   Topic Date Due     Eye Exam - yearly  03/14/1964     HIV SCREEN (SYSTEM ASSIGNED)  03/14/1981     Hepatitis C Screening  03/14/1981     Mammogram - every 2 years  11/09/2017     Depression Assessment - every 6 months  04/04/2018     Microalbumin Lab - yearly  08/28/2018       To address the above recommendations, we encourage you to contact us at [phone], via [employer] or by  contacting Central Scheduling toll free at [phone] 24 hours a day. They will assist you with finding the most convenient time and location.    Thank you for trusting [facility] and we appreciate the opportunity to serve you.  We look forward to supporting your healthcare needs in the future.    Healthy Regards,    Your [facility] Team

## 2018-08-09 PROBLEM — Z71.89 ADVANCED CARE PLANNING/COUNSELING DISCUSSION: Status: ACTIVE | Noted: 2018-08-09

## 2018-08-09 NOTE — PROGRESS NOTES
SUBJECTIVE:   Yuki Boss is a 53 year old female who presents to clinic today for the following health issues:    History of Present Illness     Depression & Anxiety Follow-up:     Depression/Anxiety:  Depression & Anxiety    Status since last visit::  Stable    Other associated symptoms of depression and anxiety::  None    Significant life event::  No    Current substance use::  None       Today's PHQ-9         PHQ-9 Total Score:     (P) 4   PHQ-9 Q9 Suicidal ideation:   (P) Not at all   Thoughts of suicide or self harm:      Self-harm Plan:        Self-harm Action:          Safety concerns for self or others:       TONY-7 Total Score: (P) 4       Diabetes Follow-up    Patient is checking blood sugars: once daily.  Results are as follows:         am - 300+    Diabetic concerns: blood sugar frequently over 200, she is looking for a medication with a coupon to last her up until october     Symptoms of hypoglycemia (low blood sugar): none     Paresthesias (numbness or burning in feet) or sores: No     Date of last diabetic eye exam: 2 years ago- patient is due - Vision has been getting slightly worse, needs referral today.      Has been unable to afford the Tresiba or the Victoza (her and her  both need these medications, they really can't afford to both be on them and they did have to both go off of them because neither of them could afford them while they were dealing with moves in homes, living in [facility] for awhile as well).      She did see [facility] between her last visit here at [facility], her A1c was 10 at that point and they recommended she increase her Glipizide 10 mg to twice per day.  She has done this without side effects, maintained on the Metformin as well without side effects.      Blood sugars are still in the 300 range.  Low as 240 and as high as 394.  She checks them AM fasting only.      They try to watch diet but admit that it isn't the best amongst all their moves.      She had a low  TSH at her [facility] visit - mother has Hashimoto's and has had multiple procedures done on thyroid. Would like this re-checked.     She also would like Vitamin B12 re-checked, she is concerned regarding these levels and risks with MS.      Denies any peripheral edema, paresthesias, constipation, diarrhea, vomiting, headaches, dysuria, urinary frequency, polydipsia, heat/cold intolerance, chest pain, shortness of breath.     Hyperlipidemia Follow-Up      Rate your low fat/cholesterol diet?: fair    Taking statin?  Yes, no muscle aches from statin    Other lipid medications/supplements?:  none    Hypertension Follow-up      Outpatient blood pressures are not being checked.    Low Salt Diet: low salt    BP Readings from Last 2 Encounters:   08/20/18 132/80   04/10/18 129/79     Hemoglobin A1C (%)   Date Value   03/20/2018 6.8 (H)   12/04/2017 7.3 (H)     LDL Cholesterol Calculated (mg/dL)   Date Value   03/20/2018 73   03/21/2016 65     LDL Cholesterol Direct (mg/dL)   Date Value   03/14/2017 94     Depression Followup    Status since last visit: no change    See PHQ-9 for current symptoms.  Other associated symptoms: None    Complicating factors:   Significant life event:  No   Current substance abuse:  None  Anxiety or Panic symptoms:  Yes-  Very low anxiety     PHQ-9 3/14/2017 10/4/2017 8/20/2018   Total Score 6 3 4   Q9: Suicide Ideation Not at all Not at all Not at all       Problem list and histories reviewed & adjusted, as indicated.  Additional history: as documented      Patient Active Problem List   Diagnosis     Allergic rhinitis     Obesity, morbid (H)     CARDIOVASCULAR SCREENING; LDL GOAL LESS THAN 130     Type 2 diabetes mellitus with hyperglycemia, without long-term current use of insulin (H)     Essential hypertension with goal blood pressure less than 140/90     Mixed hyperlipidemia     Spondylolisthesis of lumbar region     Left lumbar radiculitis     Moderate episode of recurrent major depressive  disorder (H)     Physical deconditioning     Advanced care planning/counseling discussion     Past Surgical History:   Procedure Laterality Date     D & C  10/5/1990     HYSTERECTOMY, PAP NO LONGER INDICATED  2001    bilateral ovaries remain     TONSILLECTOMY         Social History   Substance Use Topics     Smoking status: Former Smoker     Packs/day: 1.00     Years: 10.00     Quit date: 2/24/2018     Smokeless tobacco: Never Used     Alcohol use No     Family History   Problem Relation Age of Onset     Thyroid Disease Mother      Hashimoto     Cancer Father      lung, smoker     Cancer Maternal Grandmother      pancreatic     Cancer Paternal Grandfather      lymphoma     Breast Cancer Maternal Aunt      Breast Cancer Paternal Grandmother          Current Outpatient Prescriptions   Medication Sig Dispense Refill     ACETAMINOPHEN PO Take 500-1,000 mg by mouth every 8 hours as needed for pain       aspirin 81 MG tablet Take by mouth daily       blood glucose (NO BRAND SPECIFIED) lancets standard Use to test blood sugar once daily or as directed, due for an appt 100 each 0     blood glucose monitoring (DEBBIE CONTOUR) test strip Test 3 times per day 1 Box 3     cetirizine (ZYRTEC) 10 MG tablet Take 10 mg by mouth daily       fluticasone (FLONASE) 50 MCG/ACT spray Spray 1-2 sprays into both nostrils daily 1 Bottle 11     glipiZIDE (GLUCOTROL) 10 MG tablet Take 1 tablet (10 mg) by mouth 2 times daily (before meals) 180 tablet 1     lisinopril-hydrochlorothiazide (PRINZIDE/ZESTORETIC) 10-12.5 MG per tablet TAKE 1 TABLET BY MOUTH DAILY 90 tablet 1     metFORMIN (GLUCOPHAGE) 1000 MG tablet TAKE 1 TABLET (1,000 MG) BY MOUTH 2 TIMES DAILY (WITH MEALS) 60 tablet 0     NICOTINE POLACRILEX MT Take 4 mg by mouth Take 1/4 tablet several times a day       simvastatin (ZOCOR) 20 MG tablet TAKE 1 TABLET BY MOUTH AT BEDTIME 90 tablet 2     venlafaxine (EFFEXOR) 75 MG tablet TAKE 1 TABLET (75 MG) BY MOUTH 2 TIMES DAILY 180 tablet 1  "    VITAMIN D, CHOLECALCIFEROL, PO Take 1,000 Units by mouth daily       [DISCONTINUED] glipiZIDE (GLUCOTROL) 10 MG tablet Take 10 mg by mouth       [DISCONTINUED] glipiZIDE (GLUCOTROL) 5 MG tablet Take 1 tablet at breakfast and  2 tablets before dinner (Patient not taking: Reported on 8/20/2018) 90 tablet 2     Allergies   Allergen Reactions     Codeine      Copaxone [Glatiramer] Other (See Comments)     Injection site reaction     Morphine        ROS:  Constitutional, HEENT, cardiovascular, pulmonary, GI, , musculoskeletal, neuro, skin, endocrine and psych systems are negative, except as otherwise noted.    OBJECTIVE:     /80  Pulse 86  Temp 98.1  F (36.7  C) (Oral)  Resp 14  Ht 5' 7\" (1.702 m)  Wt (!) 334 lb (151.5 kg)  SpO2 95%  Breastfeeding? No  BMI 52.31 kg/m2  Body mass index is 52.31 kg/(m^2).  GENERAL: healthy, alert and no distress  NECK: no adenopathy, no asymmetry, masses, or scars and thyroid normal to palpation  RESP: lungs clear to auscultation - no rales, rhonchi or wheezes  CV: regular rate and rhythm, normal S1 S2, no S3 or S4, no murmur, click or rub, no peripheral edema and peripheral pulses strong  MS: no gross musculoskeletal defects noted, no edema  SKIN: no suspicious lesions or rashes  NEURO: Normal strength and tone, mentation intact and speech normal  PSYCH: mentation appears normal, affect normal/bright    Diagnostic Test Results:  No results found for this or any previous visit (from the past 24 hour(s)).    ASSESSMENT/PLAN:       ICD-10-CM    1. Type 2 diabetes mellitus with hyperglycemia, without long-term current use of insulin (H) E11.65 Hemoglobin A1c     Comprehensive metabolic panel     glipiZIDE (GLUCOTROL) 10 MG tablet     blood glucose monitoring (DEBBIE CONTOUR) test strip     OPHTHALMOLOGY ADULT REFERRAL     Vitamin B12   2. Essential hypertension with goal blood pressure less than 140/90 I10 Comprehensive metabolic panel     Vitamin B12   3. Mixed " hyperlipidemia E78.2 Comprehensive metabolic panel     Lipid panel reflex to direct LDL Non-fasting   4. Moderate episode of recurrent major depressive disorder (H) F33.1    5. Obesity, morbid (H) E66.01    6. Need for hepatitis C screening test Z11.59 Hepatitis C antibody   7. Visit for screening mammogram Z12.31 *MA Screening Digital Bilateral   8. Abnormal TSH R94.6 TSH with free T4 reflex       1. We will re-check labs today, likely A1c higher or at 10 still given her blood sugar readings.  Will speak with pharmacist to see cost options, but discussed may need to consider insulin (she says this is even too expensive).  She will set up with new ophthalmologist and have eye exam done.  Glipizide refilled, doesn't need Metformin yet.  Did check Vitamin B12 today at patient request, discussed her hemoglobin has been normal in past and has no symptoms of MS that we should be concerned at this point, also eats a normal diet and not al alcoholic.  Takes baby aspirin. She is interested in DM educator but her  is going to see her first and she will see if she needs to see her independent of her .     2. Blood pressure stable, re-check labs and continue Prinzide, didn't need refill today.     3. Will recheck cholesterol today, continue statin.     4. Stable    5. Working on, need to improve DM, consider DM medications to help lower A1c and weight.    6. Due for screening.     7. Needs mammogram.     8. Had abnormal TSH on June at [facility], was low.  Recommend re-checking. Mother has Hashimotos.     Follow-up pending labs and determination on next medication for DM but at the latest will be 1 month.    Sooner if any concerns.     Options for treatment and follow-up care were reviewed with the patient and/or guardian. Patient and/or guardian engaged in the decision making process and verbalized understanding of the options discussed and agreed with the final plan.     OLESYA Tolentino  JORDY KIM    Answers for HPI/ROS submitted by the patient on 8/20/2018   If you checked off any problems, how difficult have these problems made it for you to do your work, take care of things at home, or get along with other people?: Somewhat difficult  PHQ9 TOTAL SCORE: 4  TONY 7 TOTAL SCORE: 4

## 2018-08-20 ENCOUNTER — OFFICE VISIT (OUTPATIENT)
Dept: FAMILY MEDICINE | Facility: OTHER | Age: 53
End: 2018-08-20
Payer: COMMERCIAL

## 2018-08-20 VITALS
BODY MASS INDEX: 45.99 KG/M2 | RESPIRATION RATE: 14 BRPM | HEIGHT: 67 IN | TEMPERATURE: 98.1 F | DIASTOLIC BLOOD PRESSURE: 80 MMHG | HEART RATE: 86 BPM | OXYGEN SATURATION: 95 % | SYSTOLIC BLOOD PRESSURE: 132 MMHG | WEIGHT: 293 LBS

## 2018-08-20 DIAGNOSIS — E66.01 OBESITY, MORBID (H): ICD-10-CM

## 2018-08-20 DIAGNOSIS — I10 ESSENTIAL HYPERTENSION WITH GOAL BLOOD PRESSURE LESS THAN 140/90: ICD-10-CM

## 2018-08-20 DIAGNOSIS — E11.65 TYPE 2 DIABETES MELLITUS WITH HYPERGLYCEMIA, WITHOUT LONG-TERM CURRENT USE OF INSULIN (H): Primary | ICD-10-CM

## 2018-08-20 DIAGNOSIS — R79.89 ABNORMAL TSH: ICD-10-CM

## 2018-08-20 DIAGNOSIS — E78.2 MIXED HYPERLIPIDEMIA: ICD-10-CM

## 2018-08-20 DIAGNOSIS — Z11.59 NEED FOR HEPATITIS C SCREENING TEST: ICD-10-CM

## 2018-08-20 DIAGNOSIS — Z12.31 VISIT FOR SCREENING MAMMOGRAM: ICD-10-CM

## 2018-08-20 DIAGNOSIS — F33.1 MODERATE EPISODE OF RECURRENT MAJOR DEPRESSIVE DISORDER (H): ICD-10-CM

## 2018-08-20 PROCEDURE — 99214 OFFICE O/P EST MOD 30 MIN: CPT | Performed by: PHYSICIAN ASSISTANT

## 2018-08-20 RX ORDER — GLIPIZIDE 10 MG/1
10 TABLET ORAL
COMMUNITY
Start: 2018-06-20 | End: 2018-08-20

## 2018-08-20 RX ORDER — GLIPIZIDE 10 MG/1
10 TABLET ORAL
Qty: 180 TABLET | Refills: 1 | Status: SHIPPED | OUTPATIENT
Start: 2018-08-20 | End: 2019-02-11

## 2018-08-20 RX ORDER — LISINOPRIL/HYDROCHLOROTHIAZIDE 10-12.5 MG
1 TABLET ORAL DAILY
Qty: 90 TABLET | Refills: 1 | Status: CANCELLED | OUTPATIENT
Start: 2018-08-20

## 2018-08-20 ASSESSMENT — ANXIETY QUESTIONNAIRES
7. FEELING AFRAID AS IF SOMETHING AWFUL MIGHT HAPPEN: NOT AT ALL
2. NOT BEING ABLE TO STOP OR CONTROL WORRYING: NOT AT ALL
5. BEING SO RESTLESS THAT IT IS HARD TO SIT STILL: SEVERAL DAYS
GAD7 TOTAL SCORE: 4
7. FEELING AFRAID AS IF SOMETHING AWFUL MIGHT HAPPEN: NOT AT ALL
4. TROUBLE RELAXING: SEVERAL DAYS
6. BECOMING EASILY ANNOYED OR IRRITABLE: SEVERAL DAYS
1. FEELING NERVOUS, ANXIOUS, OR ON EDGE: SEVERAL DAYS
3. WORRYING TOO MUCH ABOUT DIFFERENT THINGS: NOT AT ALL
GAD7 TOTAL SCORE: 4
GAD7 TOTAL SCORE: 4

## 2018-08-20 ASSESSMENT — PATIENT HEALTH QUESTIONNAIRE - PHQ9
SUM OF ALL RESPONSES TO PHQ QUESTIONS 1-9: 4
SUM OF ALL RESPONSES TO PHQ QUESTIONS 1-9: 4
10. IF YOU CHECKED OFF ANY PROBLEMS, HOW DIFFICULT HAVE THESE PROBLEMS MADE IT FOR YOU TO DO YOUR WORK, TAKE CARE OF THINGS AT HOME, OR GET ALONG WITH OTHER PEOPLE: SOMEWHAT DIFFICULT

## 2018-08-20 ASSESSMENT — PAIN SCALES - GENERAL: PAINLEVEL: MILD PAIN (3)

## 2018-08-20 NOTE — LETTER
My Depression Action Plan  Name: Yuki Boss   Date of Birth 1965  Date: 8/13/2018    My doctor: Hubert Gomes [facility]   My clinic: [facility]  [address]  [place] [place] [address]  [phone]          GREEN    ZONE   Good Control    What it looks like:     Things are going generally well. You have normal up s and down s. You may even feel depressed from time to time, but bad moods usually last less than a day.   What you need to do:  1. Continue to care for yourself (see self care plan)  2. Check your depression survival kit and update it as needed  3. Follow your physician s recommendations including any medication.  4. Do not stop taking medication unless you consult with your physician first.           YELLOW         ZONE Getting Worse    What it looks like:     Depression is starting to interfere with your life.     It may be hard to get out of bed; you may be starting to isolate yourself from others.    Symptoms of depression are starting to last most all day and this has happened for several days.     You may have suicidal thoughts but they are not constant.   What you need to do:     1. Call your care team, your response to treatment will improve if you keep your care team informed of your progress. Yellow periods are signs an adjustment may need to be made.     2. Continue your self-care, even if you have to fake it!    3. Talk to someone in your support network    4. Open up your depression survival kit           RED    ZONE Medical Alert - Get Help    What it looks like:     Depression is seriously interfering with your life.     You may experience these or other symptoms: You can t get out of bed most days, can t work or engage in other necessary activities, you have trouble taking care of basic hygiene, or basic responsibilities, thoughts of suicide or death that will not go away, self-injurious behavior.     What you need to do:  1. Call your  care team and request a same-day appointment. If they are not available (weekends or after hours) call your local crisis line, emergency room or 911.            Depression Self Care Plan / Survival Kit    Self-Care for Depression  Here s the deal. Your body and mind are really not as separate as most people think.  What you do and think affects how you feel and how you feel influences what you do and think. This means if you do things that people who feel good do, it will help you feel better.  Sometimes this is all it takes.  There is also a place for medication and therapy depending on how severe your depression is, so be sure to consult with your medical provider and/ or Behavioral Health Consultant if your symptoms are worsening or not improving.     In order to better manage my stress, I will:    Exercise  Get some form of exercise, every day. This will help reduce pain and release endorphins, the  feel good  chemicals in your brain. This is almost as good as taking antidepressants!  This is not the same as joining a gym and then never going! (they count on that by the way ) It can be as simple as just going for a walk or doing some gardening, anything that will get you moving.      Hygiene   Maintain good hygiene (Get out of bed in the morning, Make your bed, Brush your teeth, Take a shower, and Get dressed like you were going to work, even if you are unemployed).  If your clothes don't fit try to get ones that do.    Diet  I will strive to eat foods that are good for me, drink plenty of water, and avoid excessive sugar, caffeine, alcohol, and other mood-altering substances.  Some foods that are helpful in depression are: complex carbohydrates, B vitamins, flaxseed, fish or fish oil, fresh fruits and vegetables.    Psychotherapy  I agree to participate in Individual Therapy (if recommended).    Medication  If prescribed medications, I agree to take them.  Missing doses can result in serious side effects.  I  understand that drinking alcohol, or other illicit drug use, may cause potential side effects.  I will not stop my medication abruptly without first discussing it with my provider.    Staying Connected With Others  I will stay in touch with my friends, family members, and my primary care provider/team.    Use your imagination  Be creative.  We all have a creative side; it doesn t matter if it s oil painting, sand castles, or mud pies! This will also kick up the endorphins.    Witness Beauty  (AKA stop and smell the roses) Take a look outside, even in mid-winter. Notice colors, textures. Watch the squirrels and birds.     Service to others  Be of service to others.  There is always someone else in need.  By helping others we can  get out of ourselves  and remember the really important things.  This also provides opportunities for practicing all the other parts of the program.    Humor  Laugh and be silly!  Adjust your TV habits for less news and crime-drama and more comedy.    Control your stress  Try breathing deep, massage therapy, biofeedback, and meditation. Find time to relax each day.     My support system    Clinic Contact:  Phone number:    Contact 1:  Phone number:    Contact 2:  Phone number:    Yarsanism/:  Phone number:    Therapist:  Phone number:    Local crisis center:    Phone number:    Other community support:  Phone number:

## 2018-08-20 NOTE — PATIENT INSTRUCTIONS
- We will call about medications once we have talked with Pharmacist.   - Monitor blood sugars  AM Fasting  Before and After a meal  - Set up for Mammogram and Eye exam  - We will call with labs.

## 2018-08-20 NOTE — NURSING NOTE
"Chief Complaint   Patient presents with     Establish Care     Panel Management     height, eye exam, dap, hiv, hep c, mammo, phq9,microalbumin, altagracia       Initial /80  Pulse 86  Temp 98.1  F (36.7  C) (Oral)  Resp 14  Ht 5' 7\" (1.702 m)  Wt (!) 334 lb (151.5 kg)  SpO2 95%  Breastfeeding? No  BMI 52.31 kg/m2 Estimated body mass index is 52.31 kg/(m^2) as calculated from the following:    Height as of this encounter: 5' 7\" (1.702 m).    Weight as of this encounter: 334 lb (151.5 kg).  Medication Reconciliation: complete    Mandie Meade MA  "

## 2018-08-20 NOTE — MR AVS SNAPSHOT
After Visit Summary   8/20/2018    Yuki Boss    MRN: [id number]           Patient Information     Date Of Birth          1965        Visit Information        Provider Department      8/20/2018 10:30 AM Roseline Whitten PA-C [facility]        Today's Diagnoses     Type 2 diabetes mellitus with hyperglycemia, without long-term current use of insulin (H)    -  1    Essential hypertension with goal blood pressure less than 140/90        Mixed hyperlipidemia        Moderate episode of recurrent major depressive disorder (H)        Obesity, morbid (H)        Need for hepatitis C screening test        Visit for screening mammogram        Advanced care planning/counseling discussion        Abnormal TSH          Care Instructions    - We will call about medications once we have talked with Pharmacist.   - Monitor blood sugars  AM Fasting  Before and After a meal  - Set up for Mammogram and Eye exam  - We will call with labs.             Follow-ups after your visit        Additional Services     OPHTHALMOLOGY ADULT REFERRAL       Your provider has referred you to: N: [facility] - [place] [phone]  [email or web address]    Please be aware that coverage of these services is subject to the terms and limitations of your health insurance plan.  Call member services at your health plan with any benefit or coverage questions.      Please bring the following with you to your appointment:    (1) Any X-Rays, CTs or MRIs which have been performed.  Contact the facility where they were done to arrange for  prior to your scheduled appointment.    (2) List of current medications  (3) This referral request   (4) Any documents/labs given to you for this referral                  Follow-up notes from your care team     Return in about 4 weeks (around 9/17/2018) for Medication Re-check.      Future tests that were ordered for you today     Open Future  "Orders        Priority Expected Expires Ordered    *MA Screening Digital Bilateral Routine  8/9/2019 8/20/2018            Who to contact     If you have questions or need follow up information about today's clinic visit or your schedule please contact [facility] directly at [phone].  Normal or non-critical lab and imaging results will be communicated to you by MyChart, letter or phone within 4 business days after the clinic has received the results. If you do not hear from us within 7 days, please contact the clinic through [facility] [facility] or phone. If you have a critical or abnormal lab result, we will notify you by phone as soon as possible.  Submit refill requests through [employer] or call your pharmacy and they will forward the refill request to us. Please allow 3 business days for your refill to be completed.          Additional Information About Your Visit        MyChart Information     [employer] gives you secure access to your electronic health record. If you see a primary care provider, you can also send messages to your care team and make appointments. If you have questions, please call your primary care clinic.  If you do not have a primary care provider, please call [phone] and they will assist you.        Care EveryWhere ID     This is your Care EveryWhere ID. This could be used by other organizations to access your [place] medical records  [id number]        Your Vitals Were     Pulse Temperature Respirations Height Pulse Oximetry Breastfeeding?    86 98.1  F (36.7  C) (Oral) 14 5' 7\" (1.702 m) 95% No    BMI (Body Mass Index)                   52.31 kg/m2            Blood Pressure from Last 3 Encounters:   08/20/18 132/80   04/10/18 129/79   12/04/17 124/82    Weight from Last 3 Encounters:   08/20/18 (!) 334 lb (151.5 kg)   04/10/18 (!) 346 lb 14.4 oz (157.4 kg)   12/04/17 (!) 337 lb 12.8 oz (153.2 kg)              We Performed the Following     Comprehensive metabolic panel     " Hemoglobin A1c     Hepatitis C antibody     OPHTHALMOLOGY ADULT REFERRAL     TSH with free T4 reflex     Vitamin B12          Today's Medication Changes          These changes are accurate as of 8/20/18 10:57 AM.  If you have any questions, ask your nurse or doctor.               These medicines have changed or have updated prescriptions.        Dose/Directions    blood glucose monitoring test strip   Commonly known as:  DEBBIE CONTOUR   This may have changed:    - See the new instructions.  - Another medication with the same name was removed. Continue taking this medication, and follow the directions you see here.   Used for:  Type 2 diabetes mellitus with hyperglycemia, without long-term current use of insulin (H)   Changed by:  Roseilne Whitten PA-C        Test 3 times per day   Quantity:  1 Box   Refills:  3       glipiZIDE 10 MG tablet   Commonly known as:  GLUCOTROL   This may have changed:    - when to take this  - Another medication with the same name was removed. Continue taking this medication, and follow the directions you see here.   Used for:  Type 2 diabetes mellitus with hyperglycemia, without long-term current use of insulin (H)   Changed by:  Roseline Whitten PA-C        Dose:  10 mg   Take 1 tablet (10 mg) by mouth 2 times daily (before meals)   Quantity:  180 tablet   Refills:  1         Stop taking these medicines if you haven't already. Please contact your care team if you have questions.     insulin degludec 200 UNIT/ML pen   Commonly known as:  TRESIBA   Stopped by:  Roseline Whitten PA-C           insulin pen needle 31G X 5 MM   Stopped by:  Roseline Whitten PA-C                Where to get your medicines      These medications were sent to [address] [address]     Phone:  [phone]     blood glucose monitoring test strip    glipiZIDE 10 MG tablet                Primary Care Provider Office Phone # Fax #    Roseline Whitten  OLESYA [phone]       [address] [facility] [address]        Equal Access to Services     NELSY SHORT : Hadii aad ku hadbrunilda Coon, waelizabethda lushweta, qaybta kaalmada tal, cynthia dominguez laSaundratodd andrea. So [facility] [phone].    ATENCIÓN: Si habla español, tiene a rose disposición servicios gratuitos de asistencia lingüística. Llame al [phone].    We comply with applicable federal civil rights laws and [place] laws. We do not discriminate on the basis of race, color, national origin, age, disability, sex, sexual orientation, or gender identity.            Thank you!     Thank you for choosing [facility]  for your care. Our goal is always to provide you with excellent care. Hearing back from our patients is one way we can continue to improve our services. Please take a few minutes to complete the written survey that you may receive in the mail after your visit with us. Thank you!             Your Updated Medication List - Protect others around you: Learn how to safely use, store and throw away your medicines at www.disposemymeds.org.          This list is accurate as of 8/20/18 10:57 AM.  Always use your most recent med list.                   Brand Name Dispense Instructions for use Diagnosis    ACETAMINOPHEN PO      Take 500-1,000 mg by mouth every 8 hours as needed for pain        aspirin 81 MG tablet      Take by mouth daily        blood glucose lancets standard    no brand specified    100 each    Use to test blood sugar once daily or as directed, due for an appt    Type 2 diabetes mellitus with hyperglycemia, without long-term current use of insulin (H)       blood glucose monitoring test strip    DEBBIE CONTOUR    1 Box    Test 3 times per day    Type 2 diabetes mellitus with hyperglycemia, without long-term current use of insulin (H)       cetirizine 10 MG tablet    zyrTEC     Take 10 mg by mouth daily        fluticasone 50 MCG/ACT spray    FLONASE    1  Bottle    Spray 1-2 sprays into both nostrils daily    Allergic rhinitis due to animal hair and dander, unspecified chronicity       glipiZIDE 10 MG tablet    GLUCOTROL    180 tablet    Take 1 tablet (10 mg) by mouth 2 times daily (before meals)    Type 2 diabetes mellitus with hyperglycemia, without long-term current use of insulin (H)       lisinopril-hydrochlorothiazide 10-12.5 MG per tablet    PRINZIDE/ZESTORETIC    90 tablet    TAKE 1 TABLET BY MOUTH DAILY    Type 2 diabetes mellitus with hyperglycemia, without long-term current use of insulin (H)       metFORMIN 1000 MG tablet    GLUCOPHAGE    60 tablet    TAKE 1 TABLET (1,000 MG) BY MOUTH 2 TIMES DAILY (WITH MEALS)    Type 2 diabetes mellitus with hyperglycemia, without long-term current use of insulin (H)       NICOTINE POLACRILEX MT      Take 4 mg by mouth Take 1/4 tablet several times a day        simvastatin 20 MG tablet    ZOCOR    90 tablet    TAKE 1 TABLET BY MOUTH AT BEDTIME    Mixed hyperlipidemia       venlafaxine 75 MG tablet    EFFEXOR    180 tablet    TAKE 1 TABLET (75 MG) BY MOUTH 2 TIMES DAILY    Moderate episode of recurrent major depressive disorder (H)       VITAMIN D (CHOLECALCIFEROL) PO      Take 1,000 Units by mouth daily

## 2018-08-21 ENCOUNTER — TELEPHONE (OUTPATIENT)
Dept: FAMILY MEDICINE | Facility: OTHER | Age: 53
End: 2018-08-21

## 2018-08-21 DIAGNOSIS — Z11.59 NEED FOR HEPATITIS C SCREENING TEST: ICD-10-CM

## 2018-08-21 DIAGNOSIS — I10 ESSENTIAL HYPERTENSION WITH GOAL BLOOD PRESSURE LESS THAN 140/90: ICD-10-CM

## 2018-08-21 DIAGNOSIS — E11.65 TYPE 2 DIABETES MELLITUS WITH HYPERGLYCEMIA, WITHOUT LONG-TERM CURRENT USE OF INSULIN (H): ICD-10-CM

## 2018-08-21 DIAGNOSIS — R79.89 ABNORMAL TSH: ICD-10-CM

## 2018-08-21 DIAGNOSIS — E78.2 MIXED HYPERLIPIDEMIA: ICD-10-CM

## 2018-08-21 DIAGNOSIS — E11.65 TYPE 2 DIABETES MELLITUS WITH HYPERGLYCEMIA, WITHOUT LONG-TERM CURRENT USE OF INSULIN (H): Primary | ICD-10-CM

## 2018-08-21 LAB
ALBUMIN SERPL-MCNC: 3.3 G/DL (ref 3.4–5)
ALP SERPL-CCNC: 79 U/L (ref 40–150)
ALT SERPL W P-5'-P-CCNC: 28 U/L (ref 0–50)
ANION GAP SERPL CALCULATED.3IONS-SCNC: 10 MMOL/L (ref 3–14)
AST SERPL W P-5'-P-CCNC: 15 U/L (ref 0–45)
BILIRUB SERPL-MCNC: 0.2 MG/DL (ref 0.2–1.3)
BUN SERPL-MCNC: 9 MG/DL (ref 7–30)
CALCIUM SERPL-MCNC: 8.7 MG/DL (ref 8.5–10.1)
CHLORIDE SERPL-SCNC: 100 MMOL/L (ref 94–109)
CO2 SERPL-SCNC: 27 MMOL/L (ref 20–32)
CREAT SERPL-MCNC: 0.54 MG/DL (ref 0.52–1.04)
GFR SERPL CREATININE-BSD FRML MDRD: >90 ML/MIN/1.7M2
GLUCOSE SERPL-MCNC: 338 MG/DL (ref 70–99)
HBA1C MFR BLD: 11.9 % (ref 0–5.6)
POTASSIUM SERPL-SCNC: 4.1 MMOL/L (ref 3.4–5.3)
PROT SERPL-MCNC: 7.1 G/DL (ref 6.8–8.8)
SODIUM SERPL-SCNC: 137 MMOL/L (ref 133–144)
T4 FREE SERPL-MCNC: 0.61 NG/DL (ref 0.76–1.46)
TSH SERPL DL<=0.005 MIU/L-ACNC: 0.29 MU/L (ref 0.4–4)
VIT B12 SERPL-MCNC: 349 PG/ML (ref 193–986)

## 2018-08-21 PROCEDURE — 36415 COLL VENOUS BLD VENIPUNCTURE: CPT | Performed by: PHYSICIAN ASSISTANT

## 2018-08-21 PROCEDURE — 86803 HEPATITIS C AB TEST: CPT | Performed by: PHYSICIAN ASSISTANT

## 2018-08-21 PROCEDURE — 82607 VITAMIN B-12: CPT | Performed by: PHYSICIAN ASSISTANT

## 2018-08-21 PROCEDURE — 80053 COMPREHEN METABOLIC PANEL: CPT | Performed by: PHYSICIAN ASSISTANT

## 2018-08-21 PROCEDURE — 84439 ASSAY OF FREE THYROXINE: CPT | Performed by: PHYSICIAN ASSISTANT

## 2018-08-21 PROCEDURE — 83036 HEMOGLOBIN GLYCOSYLATED A1C: CPT | Performed by: PHYSICIAN ASSISTANT

## 2018-08-21 PROCEDURE — 84443 ASSAY THYROID STIM HORMONE: CPT | Performed by: PHYSICIAN ASSISTANT

## 2018-08-21 ASSESSMENT — PATIENT HEALTH QUESTIONNAIRE - PHQ9: SUM OF ALL RESPONSES TO PHQ QUESTIONS 1-9: 4

## 2018-08-21 ASSESSMENT — ANXIETY QUESTIONNAIRES: GAD7 TOTAL SCORE: 4

## 2018-08-21 NOTE — TELEPHONE ENCOUNTER
LVM for patient to call back to the clinic.  Would like to discuss some options for her regarding her diabetes medications as well as her husbands and we have a [facility] to discuss with her.      Levemir  159.58  Lisa 210.59  Jorge Luis 118.24  Anoro 144.04    Would like to talk with patient personally.       Roseline Whitten PA-C

## 2018-08-22 LAB — HCV AB SERPL QL IA: NONREACTIVE

## 2018-08-22 RX ORDER — INSULIN GLARGINE 100 [IU]/ML
20 INJECTION, SOLUTION SUBCUTANEOUS AT BEDTIME
Qty: 15 ML | Refills: 1 | Status: SHIPPED | OUTPATIENT
Start: 2018-08-22 | End: 2018-09-28

## 2018-08-22 NOTE — TELEPHONE ENCOUNTER
Spoke with patient, she is open to starting the Basaglar.  Discussed Tresiba is not likely the medication she experienced weight loss but rather the victoza which is higher in cost.     She has already been educated and performed her own subcutaneous injections with insulin and is aware of the precautions and how to properly administer.  Hoping with diet changes and seeing diabetes educator her weight loss will come more easily and when they get new insurance we may be able to try these other medications to help her with weight loss as well.     Her TSH and Free T4 were abnormal - both are low.  Recommend we repeat this at her next visit and also add on T3 and possibly other thyroid labs.     Follow-up in 2 weeks, sooner if needed.     Roseline Whitten PA-C

## 2018-08-24 ENCOUNTER — TELEPHONE (OUTPATIENT)
Dept: FAMILY MEDICINE | Facility: OTHER | Age: 53
End: 2018-08-24

## 2018-08-24 DIAGNOSIS — R79.89 LOW TSH LEVEL: Primary | ICD-10-CM

## 2018-08-24 DIAGNOSIS — R79.89 LOW SERUM T4 LEVEL: ICD-10-CM

## 2018-08-24 NOTE — TELEPHONE ENCOUNTER
Spoke with Chacorta Armstrong with Endocrinology who reviewed the labs.     Advised if patient is on Biotin then she needs to stop for 1 week and then re-check levels.     Otherwise this is looking like a central thyroid disorder and that we should be checking labs including prolactin, IgF1, Morning Cortisol, FSH, LH.  Also plan to check T3 and re-check her TSH/T4 levels as well as TSI and TPO antibody.     She is going to check with her attending if they would recommend pituitary MRI at this point or wait on labs. She will message back regarding the MRI.      Will contact patient with these recommendations and have her come in for AM Labs.     Roseline Whitten PA-C

## 2018-08-29 DIAGNOSIS — R79.89 LOW TSH LEVEL: ICD-10-CM

## 2018-08-29 DIAGNOSIS — E78.2 MIXED HYPERLIPIDEMIA: ICD-10-CM

## 2018-08-29 DIAGNOSIS — R79.89 LOW SERUM T4 LEVEL: ICD-10-CM

## 2018-08-29 LAB
CHOLEST SERPL-MCNC: 197 MG/DL
CORTIS SERPL-MCNC: 17.2 UG/DL (ref 4–22)
FSH SERPL-ACNC: 16.7 IU/L
HDLC SERPL-MCNC: 45 MG/DL
LDLC SERPL CALC-MCNC: 91 MG/DL
LH SERPL-ACNC: 7.2 IU/L
NONHDLC SERPL-MCNC: 152 MG/DL
PROLACTIN SERPL-MCNC: 12 UG/L (ref 3–27)
T3FREE SERPL-MCNC: 2.6 PG/ML (ref 2.3–4.2)
TRIGL SERPL-MCNC: 306 MG/DL
TSH SERPL DL<=0.005 MIU/L-ACNC: 0.55 MU/L (ref 0.4–4)

## 2018-08-29 PROCEDURE — 84305 ASSAY OF SOMATOMEDIN: CPT | Performed by: PHYSICIAN ASSISTANT

## 2018-08-29 PROCEDURE — 36415 COLL VENOUS BLD VENIPUNCTURE: CPT | Performed by: PHYSICIAN ASSISTANT

## 2018-08-29 PROCEDURE — 82533 TOTAL CORTISOL: CPT | Performed by: PHYSICIAN ASSISTANT

## 2018-08-29 PROCEDURE — 83002 ASSAY OF GONADOTROPIN (LH): CPT | Performed by: PHYSICIAN ASSISTANT

## 2018-08-29 PROCEDURE — 80061 LIPID PANEL: CPT | Performed by: PHYSICIAN ASSISTANT

## 2018-08-29 PROCEDURE — 84443 ASSAY THYROID STIM HORMONE: CPT | Performed by: PHYSICIAN ASSISTANT

## 2018-08-29 PROCEDURE — 99000 SPECIMEN HANDLING OFFICE-LAB: CPT | Performed by: PHYSICIAN ASSISTANT

## 2018-08-29 PROCEDURE — 86376 MICROSOMAL ANTIBODY EACH: CPT | Performed by: PHYSICIAN ASSISTANT

## 2018-08-29 PROCEDURE — 84481 FREE ASSAY (FT-3): CPT | Performed by: PHYSICIAN ASSISTANT

## 2018-08-29 PROCEDURE — 83001 ASSAY OF GONADOTROPIN (FSH): CPT | Performed by: PHYSICIAN ASSISTANT

## 2018-08-29 PROCEDURE — 84146 ASSAY OF PROLACTIN: CPT | Performed by: PHYSICIAN ASSISTANT

## 2018-08-29 PROCEDURE — 84445 ASSAY OF TSI GLOBULIN: CPT | Mod: 90 | Performed by: PHYSICIAN ASSISTANT

## 2018-08-30 LAB — THYROPEROXIDASE AB SERPL-ACNC: 790 IU/ML

## 2018-08-31 ENCOUNTER — TELEPHONE (OUTPATIENT)
Dept: FAMILY MEDICINE | Facility: OTHER | Age: 53
End: 2018-08-31

## 2018-08-31 LAB — IGF-I BLD-MCNC: 100 NG/ML (ref 49–234)

## 2018-08-31 NOTE — TELEPHONE ENCOUNTER
"LM for patient to call back regarding results:   Please give her this message when she calls back....  . \" Her thyroid labs actually came back within normal limits this time but TSH was low end of normal.  Her Thyroid peroxidase antibody was elevated.  Spoke with Endocrinology who noted if she is premenopausal that this just means her risk of hypothyroidism is higher and she needs to be monitored yearly with her thyroid. They also noted that your cortisol while it was normal was upper limits of normal, they are going to check with their attending to see if anything else needs to be done.  But noted in the meantime we need to control your DM and also recommend we check T4 lab at your next visit.   Per Roseline Whitten  "

## 2018-09-05 LAB — TSI SER-ACNC: <1 TSI INDEX

## 2018-09-07 NOTE — PROGRESS NOTES
SUBJECTIVE:   Yuki Boss is a 53 year old female who presents to clinic today for the following health issues:    HPI  Diabetes Follow-up      Patient is checking blood sugars: At least once per day.      AM Fastin highest to 279 lowest, Premeal 228-345, Postmeal 304-361    Diabetic concerns: None and blood sugar frequently over 200     Symptoms of hypoglycemia (low blood sugar): none     Paresthesias (numbness or burning in feet) or sores: No    Diabetes Management Resources    Hyperlipidemia Follow-Up      Rate your low fat/cholesterol diet?: not monitoring fat    Taking statin?  Yes, no muscle aches from statin    Other lipid medications/supplements?:  none    Hypertension Follow-up      Outpatient blood pressures are not being checked.    Low Salt Diet: no added salt    BP Readings from Last 2 Encounters:   18 132/72   18 132/80     Hemoglobin A1C (%)   Date Value   2018 11.8 (H)   2018 11.9 (H)     LDL Cholesterol Calculated (mg/dL)   Date Value   2018 91   2018 73       - Has a history of MS, diagnosed about 1 year ago.  Saw neurology, tried one medication but had side effects.  Says she always has her MS symptoms but not progressing.  Doesn't want to see Neurology or be on medications.      - Plan from last OV 2018:  We will re-check labs today, likely A1c higher or at 10 still given her blood sugar readings.  Will speak with pharmacist to see cost options, but discussed may need to consider insulin (she says this is even too expensive).  She will set up with new ophthalmologist and have eye exam done.  Glipizide refilled, doesn't need Metformin yet.  Did check Vitamin B12 today at patient request, discussed her hemoglobin has been normal in past and has no symptoms of MS that we should be concerned at this point, also eats a normal diet and not al alcoholic.  Takes baby aspirin. She is interested in DM educator but her  is going to see her first  and she will see if she needs to see her independent of her       Problem list and histories reviewed & adjusted, as indicated.  Additional history: as documented    Patient Active Problem List   Diagnosis     Allergic rhinitis     Obesity, morbid (H)     CARDIOVASCULAR SCREENING; LDL GOAL LESS THAN 130     Type 2 diabetes mellitus with hyperglycemia, without long-term current use of insulin (H)     Essential hypertension with goal blood pressure less than 140/90     Mixed hyperlipidemia     Spondylolisthesis of lumbar region     Left lumbar radiculitis     Moderate episode of recurrent major depressive disorder (H)     Physical deconditioning     Advanced care planning/counseling discussion     Astigmatism     Hypermetropia     Low TSH level     Low serum T4 level     MS (multiple sclerosis) (H)     Past Surgical History:   Procedure Laterality Date     D & C  10/5/1990     HYSTERECTOMY, PAP NO LONGER INDICATED  2001    bilateral ovaries remain     TONSILLECTOMY         Social History   Substance Use Topics     Smoking status: Former Smoker     Packs/day: 1.00     Years: 10.00     Quit date: 2/24/2018     Smokeless tobacco: Never Used     Alcohol use No     Family History   Problem Relation Age of Onset     Thyroid Disease Mother      Hashimoto     Cancer Father      lung, smoker     Cancer Maternal Grandmother      pancreatic     Cancer Paternal Grandfather      lymphoma     Breast Cancer Maternal Aunt      Breast Cancer Paternal Grandmother          Current Outpatient Prescriptions   Medication Sig Dispense Refill     ACETAMINOPHEN PO Take 500-1,000 mg by mouth every 8 hours as needed for pain       aspirin 81 MG tablet Take by mouth daily       BASAGLAR 100 UNIT/ML injection Inject 20 Units Subcutaneous At Bedtime 15 mL 1     blood glucose monitoring (NO BRAND SPECIFIED) test strip Use to test blood sugar 2 times daily or as directed.       cetirizine (ZYRTEC) 10 MG tablet Take 10 mg by mouth daily        fluticasone (FLONASE) 50 MCG/ACT spray Spray 1-2 sprays into both nostrils daily 1 Bottle 11     glipiZIDE (GLUCOTROL) 10 MG tablet Take 1 tablet (10 mg) by mouth 2 times daily (before meals) 180 tablet 1     insulin pen needle (B-D U/F) 31G X 5 MM Use 1 daily or as directed. 100 each prn     lisinopril-hydrochlorothiazide (PRINZIDE/ZESTORETIC) 10-12.5 MG per tablet TAKE 1 TABLET BY MOUTH DAILY 90 tablet 1     NICOTINE POLACRILEX MT Take 4 mg by mouth Take 1/4 tablet several times a day       simvastatin (ZOCOR) 20 MG tablet TAKE 1 TABLET BY MOUTH AT BEDTIME 90 tablet 2     TRUEPLUS SAFETY LANCETS 28G MISC        venlafaxine (EFFEXOR) 75 MG tablet TAKE 1 TABLET (75 MG) BY MOUTH 2 TIMES DAILY 180 tablet 1     VITAMIN D, CHOLECALCIFEROL, PO Take 1,000 Units by mouth daily       metFORMIN (GLUCOPHAGE) 1000 MG tablet Take 1 tablet (1,000 mg) by mouth 2 times daily (with meals) 180 tablet 1     Allergies   Allergen Reactions     Codeine      Copaxone [Glatiramer] Other (See Comments)     Injection site reaction     Morphine        ROS:  Constitutional, HEENT, cardiovascular, pulmonary, GI, , musculoskeletal, neuro, skin, endocrine and psych systems are negative, except as otherwise noted.    OBJECTIVE:     /72 (BP Location: Left arm, Patient Position: Chair, Cuff Size: Adult Large)  Pulse 90  Temp 99.2  F (37.3  C) (Temporal)  Resp 18  Wt (!) 334 lb (151.5 kg)  SpO2 97%  BMI 52.31 kg/m2  Body mass index is 52.31 kg/(m^2).  GENERAL: healthy, alert and no distress  NECK: no adenopathy, no asymmetry, masses, or scars and thyroid normal to palpation  RESP: lungs clear to auscultation - no rales, rhonchi or wheezes  CV: regular rate and rhythm, normal S1 S2, no S3 or S4, no murmur, click or rub, no peripheral edema and peripheral pulses strong  MS: no gross musculoskeletal defects noted, no edema  SKIN: no suspicious lesions or rashes  NEURO: Normal strength and tone, mentation intact and speech normal  PSYCH:  mentation appears normal, affect normal/bright    Diagnostic Test Results:  Results for orders placed or performed in visit on 09/12/18 (from the past 24 hour(s))   Hemoglobin A1c   Result Value Ref Range    Hemoglobin A1C 11.8 (H) 0 - 5.6 %       ASSESSMENT/PLAN:       ICD-10-CM    1. Type 2 diabetes mellitus with hyperglycemia, without long-term current use of insulin (H) E11.65 Albumin Random Urine Quantitative with Creat Ratio     Hemoglobin A1c   2. Obesity, morbid (H) E66.01    3. Essential hypertension with goal blood pressure less than 140/90 I10    4. Mixed hyperlipidemia E78.2    5. Need for prophylactic vaccination and inoculation against influenza Z23 FLU VACCINE, (RIV4) RECOMBINANT HA  , IM (FluBlok, egg free) [87072]- >18 YRS ([facility] recommended  50-64 YRS)     Vaccine Administration, Initial [74355]   6. Low TSH level R94.6 ENDOCRINOLOGY ADULT REFERRAL   7. Low serum T4 level R94.6 ENDOCRINOLOGY ADULT REFERRAL   8. MS (multiple sclerosis) (H) G35        1. Due for labs.  A1c and sugars are slightly improved.  Will increase dose of Basaglar to 22 units at bedtime, continue Metformin at current dose.      2. Continue to work on diet.     3. Stable on currnet medications.     4. Stable on Statin.     5. Updated influenza vaccine today.     6/7: Referred to Endocrinology but waiting to hear back from Resident if needs to be seen.  Needs TSH re-checked annually.     8: recommend to get [facility]I imaging reports to check for MS lesions.  Will need repeat MRIs annually, if changes advised to see Neurology and consider medications.  Extensive discussion on MS done today and that slowing progression is only treatment right now.     Follow-up on Monday with blood sugar results with increase in Basaglar, will determine next OV based on results.     Options for treatment and follow-up care were reviewed with the patient and/or guardian. Patient and/or guardian engaged in the decision making process and verbalized  understanding of the options discussed and agreed with the final plan.     Roseline Whitten PA-C  [facility]

## 2018-09-12 ENCOUNTER — OFFICE VISIT (OUTPATIENT)
Dept: FAMILY MEDICINE | Facility: OTHER | Age: 53
End: 2018-09-12
Payer: COMMERCIAL

## 2018-09-12 ENCOUNTER — TELEPHONE (OUTPATIENT)
Dept: FAMILY MEDICINE | Facility: OTHER | Age: 53
End: 2018-09-12

## 2018-09-12 VITALS
TEMPERATURE: 99.2 F | RESPIRATION RATE: 18 BRPM | BODY MASS INDEX: 52.31 KG/M2 | OXYGEN SATURATION: 97 % | SYSTOLIC BLOOD PRESSURE: 132 MMHG | DIASTOLIC BLOOD PRESSURE: 72 MMHG | HEART RATE: 90 BPM | WEIGHT: 293 LBS

## 2018-09-12 DIAGNOSIS — Z23 NEED FOR PROPHYLACTIC VACCINATION AND INOCULATION AGAINST INFLUENZA: ICD-10-CM

## 2018-09-12 DIAGNOSIS — E78.2 MIXED HYPERLIPIDEMIA: ICD-10-CM

## 2018-09-12 DIAGNOSIS — E66.01 OBESITY, MORBID (H): ICD-10-CM

## 2018-09-12 DIAGNOSIS — R79.89 LOW TSH LEVEL: ICD-10-CM

## 2018-09-12 DIAGNOSIS — E11.65 TYPE 2 DIABETES MELLITUS WITH HYPERGLYCEMIA, WITHOUT LONG-TERM CURRENT USE OF INSULIN (H): Primary | ICD-10-CM

## 2018-09-12 DIAGNOSIS — G35 MS (MULTIPLE SCLEROSIS) (H): ICD-10-CM

## 2018-09-12 DIAGNOSIS — I10 ESSENTIAL HYPERTENSION WITH GOAL BLOOD PRESSURE LESS THAN 140/90: ICD-10-CM

## 2018-09-12 DIAGNOSIS — R79.89 LOW SERUM T4 LEVEL: ICD-10-CM

## 2018-09-12 LAB
CREAT UR-MCNC: 109 MG/DL
HBA1C MFR BLD: 11.8 % (ref 0–5.6)
MICROALBUMIN UR-MCNC: 35 MG/L
MICROALBUMIN/CREAT UR: 32.02 MG/G CR (ref 0–25)

## 2018-09-12 PROCEDURE — 99214 OFFICE O/P EST MOD 30 MIN: CPT | Mod: 25 | Performed by: PHYSICIAN ASSISTANT

## 2018-09-12 PROCEDURE — 90682 RIV4 VACC RECOMBINANT DNA IM: CPT | Performed by: PHYSICIAN ASSISTANT

## 2018-09-12 PROCEDURE — 82043 UR ALBUMIN QUANTITATIVE: CPT | Performed by: PHYSICIAN ASSISTANT

## 2018-09-12 PROCEDURE — 36415 COLL VENOUS BLD VENIPUNCTURE: CPT | Performed by: PHYSICIAN ASSISTANT

## 2018-09-12 PROCEDURE — G0008 ADMIN INFLUENZA VIRUS VAC: HCPCS | Performed by: PHYSICIAN ASSISTANT

## 2018-09-12 PROCEDURE — 83036 HEMOGLOBIN GLYCOSYLATED A1C: CPT | Performed by: PHYSICIAN ASSISTANT

## 2018-09-12 RX ORDER — LANCETS 28 GAUGE
EACH MISCELLANEOUS
COMMUNITY
End: 2018-10-15

## 2018-09-12 NOTE — MR AVS SNAPSHOT
After Visit Summary   9/12/2018    Yuki Boss    MRN: [id number]           Patient Information     Date Of Birth          1965        Visit Information        Provider Department      9/12/2018 10:40 AM Roseline Whitten PA-C [facility]        Today's Diagnoses     Type 2 diabetes mellitus with hyperglycemia, without long-term current use of insulin (H)    -  1    Visit for screening mammogram        Screening for HIV (human immunodeficiency virus)        Obesity, morbid (H)        Essential hypertension with goal blood pressure less than 140/90        Mixed hyperlipidemia        Need for prophylactic vaccination and inoculation against influenza        Low TSH level        Low serum T4 level        MS (multiple sclerosis) (H)          Care Instructions    22 units at bedtime of insulin  Monitor blood sugars  Call me on Monday or I'll call you to get your readings and adjust dose.     Call [facility] to send us the reports from your MRI imaging.             Follow-ups after your visit        Additional Services     ENDOCRINOLOGY ADULT REFERRAL       Your provider has referred you to: [facility]: [facility] [phone]   [email or web address]  [facility]: [facility] [place] [phone]   [email or web address]      Please be aware that coverage of these services is subject to the terms and limitations of your health insurance plan.  Call member services at your health plan with any benefit or coverage questions.      Please bring the following to your appointment:    >>   Any x-rays, CTs or MRIs which have been performed.  Contact the facility where they were done to arrange for  prior to your scheduled appointment.    >>   List of current medications   >>   This referral request   >>   Any documents/labs given to you for this  referral                  Your next 10 appointments already scheduled     Sep 20, 2018 10:45 AM CDT   MA SCREENING DIGITAL BILATERAL with ERMA1   [facility] ([facility])    [address] [facility] [id number] [phone]           Do not use any powder, lotion or deodorant under your arms or on your breast. If you do, we will ask you to remove it before your exam.  Wear comfortable, two-piece clothing.  If you have any allergies, tell your care team.  Bring any previous mammograms from other facilities or have them mailed to the breast center.              Who to contact     If you have questions or need follow up information about today's clinic visit or your schedule please contact [facility] directly at [phone].  Normal or non-critical lab and imaging results will be communicated to you by MyChart, letter or phone within 4 business days after the clinic has received the results. If you do not hear from us within 7 days, please contact the clinic through [employer] or phone. If you have a critical or abnormal lab result, we will notify you by phone as soon as possible.  Submit refill requests through [employer] or call your pharmacy and they will forward the refill request to us. Please allow 3 business days for your refill to be completed.          Additional Information About Your Visit        [employer] Information     [employer] gives you secure access to your electronic health record. If you see a primary care provider, you can also send messages to your care team and make appointments. If you have questions, please call your primary care clinic.  If you do not have a primary care provider, please call [phone] and they will assist you.        Care EveryWhere ID     This is your Care EveryWhere ID. This could be used by other organizations to access your [place] medical records  [id number]        Your Vitals Were     Pulse Temperature Respirations  Pulse Oximetry BMI (Body Mass Index)       90 99.2  F (37.3  C) (Temporal) 18 97% 52.31 kg/m2        Blood Pressure from Last 3 Encounters:   09/12/18 132/72   08/20/18 132/80   04/10/18 129/79    Weight from Last 3 Encounters:   09/12/18 (!) 334 lb (151.5 kg)   08/20/18 (!) 334 lb (151.5 kg)   04/10/18 (!) 346 lb 14.4 oz (157.4 kg)              We Performed the Following     Albumin Random Urine Quantitative with Creat Ratio     ENDOCRINOLOGY ADULT REFERRAL     FLU VACCINE, (RIV4) RECOMBINANT HA  , IM (FluBlok, egg free) [33354]- >18 YRS ([facility] recommended  50-64 YRS)     Hemoglobin A1c     Vaccine Administration, Initial [27705]        Primary Care Provider Office Phone # Fax #    Roseline Whitten PA-C [phone]       [address] [place] [id number]        Equal Access to Services     NELSY SHORT : Hadii aad ku hadasho Soomaali, waaxda luqadaha, qaybta kaalmada adeegyada, waxay jollyin armen gilliland . So [facility] [phone].    ATENCIÓN: Si habla español, tiene a rose disposición servicios gratuitos de asistencia lingüística. [facility] [phone].    We comply with applicable federal civil rights laws and [place] laws. We do not discriminate on the basis of race, color, national origin, age, disability, sex, sexual orientation, or gender identity.            Thank you!     Thank you for choosing [facility]  for your care. Our goal is always to provide you with excellent care. Hearing back from our patients is one way we can continue to improve our services. Please take a few minutes to complete the written survey that you may receive in the mail after your visit with us. Thank you!             Your Updated Medication List - Protect others around you: Learn how to safely use, store and throw away your medicines at www.disposemymeds.org.          This list is accurate as of 9/12/18 11:01 AM.  Always use your most recent med list.                   Brand Name Dispense  Instructions for use Diagnosis    ACETAMINOPHEN PO      Take 500-1,000 mg by mouth every 8 hours as needed for pain        aspirin 81 MG tablet      Take by mouth daily        BASAGLAR 100 UNIT/ML injection     15 mL    Inject 20 Units Subcutaneous At Bedtime    Type 2 diabetes mellitus with hyperglycemia, without long-term current use of insulin (H)       blood glucose monitoring test strip    no brand specified     Use to test blood sugar 2 times daily or as directed.        cetirizine 10 MG tablet    zyrTEC     Take 10 mg by mouth daily        fluticasone 50 MCG/ACT spray    FLONASE    1 Bottle    Spray 1-2 sprays into both nostrils daily    Allergic rhinitis due to animal hair and dander, unspecified chronicity       glipiZIDE 10 MG tablet    GLUCOTROL    180 tablet    Take 1 tablet (10 mg) by mouth 2 times daily (before meals)    Type 2 diabetes mellitus with hyperglycemia, without long-term current use of insulin (H)       insulin pen needle 31G X 5 MM    B-D U/F    100 each    Use 1 daily or as directed.    Type 2 diabetes mellitus with hyperglycemia, without long-term current use of insulin (H)       lisinopril-hydrochlorothiazide 10-12.5 MG per tablet    PRINZIDE/ZESTORETIC    90 tablet    TAKE 1 TABLET BY MOUTH DAILY    Type 2 diabetes mellitus with hyperglycemia, without long-term current use of insulin (H)       metFORMIN 1000 MG tablet    GLUCOPHAGE    60 tablet    TAKE 1 TABLET (1,000 MG) BY MOUTH 2 TIMES DAILY (WITH MEALS)    Type 2 diabetes mellitus with hyperglycemia, without long-term current use of insulin (H)       NICOTINE POLACRILEX MT      Take 4 mg by mouth Take 1/4 tablet several times a day        simvastatin 20 MG tablet    ZOCOR    90 tablet    TAKE 1 TABLET BY MOUTH AT BEDTIME    Mixed hyperlipidemia       TRUEPLUS SAFETY LANCETS 28G Misc           venlafaxine 75 MG tablet    EFFEXOR    180 tablet    TAKE 1 TABLET (75 MG) BY MOUTH 2 TIMES DAILY    Moderate episode of recurrent major  depressive disorder (H)       VITAMIN D (CHOLECALCIFEROL) PO      Take 1,000 Units by mouth daily

## 2018-09-12 NOTE — PATIENT INSTRUCTIONS
22 units at bedtime of insulin  Monitor blood sugars  Call me on Monday or I'll call you to get your readings and adjust dose.     Call [facility] to send us the reports from your MRI imaging.

## 2018-09-12 NOTE — TELEPHONE ENCOUNTER
----- Message from Roseline Whitten PA-C sent at 9/12/2018  2:34 PM CDT -----  Please call.  Protein now present in urine.  This is because her Diabetes is uncontrolled, we just need to monitor.     Roseline Whitten PA-C

## 2018-09-12 NOTE — PROGRESS NOTES
Injectable Influenza Immunization Documentation    1.  Is the person to be vaccinated sick today?   No    2. Does the person to be vaccinated have an allergy to a component   of the vaccine?   No  Egg Allergy Algorithm Link    3. Has the person to be vaccinated ever had a serious reaction   to influenza vaccine in the past?   No    4. Has the person to be vaccinated ever had Guillain-Barré syndrome?   No    Form completed by Lani Wood CMA     Prior to injection verified patient identity using patient's name and date of birth.  Due to injection administration, patient instructed to remain in clinic for 15 minutes  afterwards, and to report any adverse reaction to me immediately.

## 2018-09-19 ENCOUNTER — TELEPHONE (OUTPATIENT)
Dept: FAMILY MEDICINE | Facility: OTHER | Age: 53
End: 2018-09-19

## 2018-09-19 DIAGNOSIS — R63.5 WEIGHT GAIN: Primary | ICD-10-CM

## 2018-09-19 RX ORDER — DEXAMETHASONE 1 MG
TABLET ORAL
Qty: 1 TABLET | Refills: 0 | Status: SHIPPED | OUTPATIENT
Start: 2018-09-19 | End: 2018-10-15

## 2018-09-19 NOTE — TELEPHONE ENCOUNTER
Please call patient.  Endocrinology responded back to us.  They would like to test her for Cushing Syndrome.     We need to do something called Dexamethasone Suppression Test.  Your cortisol level was previously normal but upper end of normal and they recommend this test to rule out this syndrome.     Sent in Decadron to the pharmacy.  She takes this 1 tablet between 11 pm and midnight and the next morning she needs to return to the clinic by 8 AM to have her cortisol level redrawn.  If it is elevated then Endocrinology will see her, if not then we can rule out Cushing Syndrome.     Please schedule her Cortisol 8 AM lab draw and remind her to take medication between 11 pm - midnight the night before.  It is also written on the bottle.     Roseline Whitten PA-C

## 2018-09-20 ENCOUNTER — RADIANT APPOINTMENT (OUTPATIENT)
Dept: MAMMOGRAPHY | Facility: OTHER | Age: 53
End: 2018-09-20
Attending: PHYSICIAN ASSISTANT
Payer: COMMERCIAL

## 2018-09-20 DIAGNOSIS — Z12.31 VISIT FOR SCREENING MAMMOGRAM: ICD-10-CM

## 2018-09-20 PROCEDURE — 77067 SCR MAMMO BI INCL CAD: CPT | Mod: TC

## 2018-09-25 ENCOUNTER — TELEPHONE (OUTPATIENT)
Dept: FAMILY MEDICINE | Facility: OTHER | Age: 53
End: 2018-09-25

## 2018-09-25 NOTE — TELEPHONE ENCOUNTER
Phone went straight to .  LM regarding message below.  Eliz Cantu CMA ([facility])

## 2018-09-25 NOTE — TELEPHONE ENCOUNTER
Patient returned the call, she stated she had had no low blood sugars.  She stated she upped her dose on the 20th from 22 units to 24 units.  On average her blood sugars in the am are 265-286.

## 2018-09-25 NOTE — TELEPHONE ENCOUNTER
Attempted to contact patient.     Phone would not ring, this happened previously and had to be redialed at a different time.      Would like to get in touch with patient.  Need to know the following information:    1. Blood sugars in the last few days, readings    2. What dose of Basaglar and when?    3. Any low blood sugars?      Roseline Whitten PA-C

## 2018-09-26 DIAGNOSIS — R63.5 WEIGHT GAIN: ICD-10-CM

## 2018-09-26 LAB — CORTIS SERPL-MCNC: 0.9 UG/DL (ref 4–22)

## 2018-09-26 PROCEDURE — 82533 TOTAL CORTISOL: CPT | Performed by: PHYSICIAN ASSISTANT

## 2018-09-26 PROCEDURE — 36415 COLL VENOUS BLD VENIPUNCTURE: CPT | Performed by: PHYSICIAN ASSISTANT

## 2018-09-26 NOTE — TELEPHONE ENCOUNTER
----- Message from Roseline Whitten PA-C sent at 9/26/2018  3:53 PM CDT -----  Please call patient.  Her Cortisol level is < 1.8 which means it is negative which is good.  We just need to continue to monitor her thyroid function to monitor for hypothyroidism.   Roseline Whitten PA-C

## 2018-09-26 NOTE — TELEPHONE ENCOUNTER
Recommend that she increase another 2 units.  Check morning sugars.      Will contact on Friday - will want her AM fasting Sugar Results.     Roseline Whitten PA-C

## 2018-09-26 NOTE — TELEPHONE ENCOUNTER
See both messages below. Called patient, no answer, no voicemail. Will need to try again.  Valeri Peterson, CMA

## 2018-09-27 NOTE — TELEPHONE ENCOUNTER
Contacted patient and relayed both messages to her.  She had no further questions or concerns at this time.

## 2018-09-28 ENCOUNTER — OFFICE VISIT (OUTPATIENT)
Dept: FAMILY MEDICINE | Facility: OTHER | Age: 53
End: 2018-09-28
Payer: COMMERCIAL

## 2018-09-28 VITALS
TEMPERATURE: 98.1 F | HEART RATE: 87 BPM | WEIGHT: 293 LBS | SYSTOLIC BLOOD PRESSURE: 128 MMHG | BODY MASS INDEX: 45.99 KG/M2 | RESPIRATION RATE: 16 BRPM | HEIGHT: 67 IN | OXYGEN SATURATION: 98 % | DIASTOLIC BLOOD PRESSURE: 80 MMHG

## 2018-09-28 DIAGNOSIS — R39.9 UTI SYMPTOMS: Primary | ICD-10-CM

## 2018-09-28 DIAGNOSIS — E11.65 TYPE 2 DIABETES MELLITUS WITH HYPERGLYCEMIA, WITHOUT LONG-TERM CURRENT USE OF INSULIN (H): ICD-10-CM

## 2018-09-28 LAB
ALBUMIN UR-MCNC: NEGATIVE MG/DL
APPEARANCE UR: CLEAR
BILIRUB UR QL STRIP: NEGATIVE
COLOR UR AUTO: YELLOW
GLUCOSE UR STRIP-MCNC: >=1000 MG/DL
HGB UR QL STRIP: ABNORMAL
KETONES UR STRIP-MCNC: NEGATIVE MG/DL
LEUKOCYTE ESTERASE UR QL STRIP: NEGATIVE
NITRATE UR QL: NEGATIVE
PH UR STRIP: 5.5 PH (ref 5–7)
RBC #/AREA URNS AUTO: ABNORMAL /HPF
SOURCE: ABNORMAL
SP GR UR STRIP: 1.02 (ref 1–1.03)
UROBILINOGEN UR STRIP-ACNC: 0.2 EU/DL (ref 0.2–1)
WBC #/AREA URNS AUTO: ABNORMAL /HPF

## 2018-09-28 PROCEDURE — 99214 OFFICE O/P EST MOD 30 MIN: CPT | Performed by: FAMILY MEDICINE

## 2018-09-28 PROCEDURE — 81001 URINALYSIS AUTO W/SCOPE: CPT | Performed by: FAMILY MEDICINE

## 2018-09-28 RX ORDER — INSULIN GLARGINE 100 [IU]/ML
20 INJECTION, SOLUTION SUBCUTANEOUS AT BEDTIME
Qty: 45 ML | Refills: 0 | Status: SHIPPED | OUTPATIENT
Start: 2018-09-28 | End: 2018-12-12 | Stop reason: ALTCHOICE

## 2018-09-28 RX ORDER — CIPROFLOXACIN 250 MG/1
250 TABLET, FILM COATED ORAL 2 TIMES DAILY
Qty: 6 TABLET | Refills: 0 | Status: SHIPPED | OUTPATIENT
Start: 2018-09-28 | End: 2018-10-15

## 2018-09-28 ASSESSMENT — PAIN SCALES - GENERAL: PAINLEVEL: MILD PAIN (2)

## 2018-09-28 NOTE — PROGRESS NOTES
SUBJECTIVE:   Yuki Boss is a 53 year old female who presents to clinic today for the following health issues:      HPI     URINARY TRACT SYMPTOMS  Onset: Today    Description:   Painful urination (Dysuria): YES  Blood in urine (Hematuria): no   Delay in urine (Hesitency): YES- Does have MS    Intensity: moderate, 2/10    Progression of Symptoms:  same    Accompanying Signs & Symptoms:  Fever/chills: no   Flank pain no   Nausea and vomiting: no   Any vaginal symptoms: none  Abdominal/Pelvic Pain: YES    History:   History of frequent UTI's: YES  History of kidney stones: no   Sexually Active: no   Possibility of pregnancy: No  Therapies Tried and outcome: None      Problem list and histories reviewed & adjusted, as indicated.  Additional history: as documented        Patient Active Problem List   Diagnosis     Allergic rhinitis     Obesity, morbid (H)     Type 2 diabetes mellitus with hyperglycemia, without long-term current use of insulin (H)     Essential hypertension with goal blood pressure less than 140/90     Mixed hyperlipidemia     Spondylolisthesis of lumbar region     Left lumbar radiculitis     Moderate episode of recurrent major depressive disorder (H)     Physical deconditioning     Advanced care planning/counseling discussion     Astigmatism     Hypermetropia     Low TSH level     Low serum T4 level     MS (multiple sclerosis) (H)     Past Surgical History:   Procedure Laterality Date     D & C  10/5/1990     HYSTERECTOMY, PAP NO LONGER INDICATED  2001    bilateral ovaries remain     TONSILLECTOMY         Social History   Substance Use Topics     Smoking status: Former Smoker     Packs/day: 1.00     Years: 10.00     Quit date: 2/24/2018     Smokeless tobacco: Never Used     Alcohol use No     Family History   Problem Relation Age of Onset     Thyroid Disease Mother      Hashimoto     Cancer Father      lung, smoker     Cancer Maternal Grandmother      pancreatic     Cancer Paternal Grandfather       lymphoma     Breast Cancer Maternal Aunt      Breast Cancer Paternal Grandmother          Current Outpatient Prescriptions   Medication Sig Dispense Refill     ACETAMINOPHEN PO Take 500-1,000 mg by mouth every 8 hours as needed for pain       aspirin 81 MG tablet Take by mouth daily       BASAGLAR 100 UNIT/ML injection Inject 20 Units Subcutaneous At Bedtime Increase by 2 units every other night until you reach 40 units or if morning sugars are consistently less than 200 which ever happens earlier. 45 mL 0     blood glucose monitoring (NO BRAND SPECIFIED) test strip Use to test blood sugar 2 times daily or as directed.       cetirizine (ZYRTEC) 10 MG tablet Take 10 mg by mouth daily       ciprofloxacin (CIPRO) 250 MG tablet Take 1 tablet (250 mg) by mouth 2 times daily 6 tablet 0     dexamethasone (DECADRON) 1 MG tablet 1 mg between 11 pm - midnight 1 tablet 0     fluticasone (FLONASE) 50 MCG/ACT spray Spray 1-2 sprays into both nostrils daily 1 Bottle 11     glipiZIDE (GLUCOTROL) 10 MG tablet Take 1 tablet (10 mg) by mouth 2 times daily (before meals) 180 tablet 1     insulin pen needle (B-D U/F) 31G X 5 MM Use 1 daily or as directed. 100 each prn     lisinopril-hydrochlorothiazide (PRINZIDE/ZESTORETIC) 10-12.5 MG per tablet TAKE 1 TABLET BY MOUTH DAILY 90 tablet 1     metFORMIN (GLUCOPHAGE) 1000 MG tablet Take 1 tablet (1,000 mg) by mouth 2 times daily (with meals) 180 tablet 1     NICOTINE POLACRILEX MT Take 4 mg by mouth Take 1/4 tablet several times a day       simvastatin (ZOCOR) 20 MG tablet TAKE 1 TABLET BY MOUTH AT BEDTIME 90 tablet 2     TRUEPLUS SAFETY LANCETS 28G MISC        venlafaxine (EFFEXOR) 75 MG tablet TAKE 1 TABLET (75 MG) BY MOUTH 2 TIMES DAILY 180 tablet 1     VITAMIN D, CHOLECALCIFEROL, PO Take 1,000 Units by mouth daily       [DISCONTINUED] BASAGLAR 100 UNIT/ML injection Inject 20 Units Subcutaneous At Bedtime 15 mL 1     Allergies   Allergen Reactions     Codeine      Copaxone  "[Glatiramer] Other (See Comments)     Injection site reaction     Morphine      Recent Labs   Lab Test  09/12/18   1029  08/29/18   0742  08/21/18   1524 06/27/18 03/20/18   0810  12/04/17   1003   03/14/17   1515  03/21/16   A1C  11.8*   --   11.9*  10.2  6.8*  7.3*   < >  8.9*   < >  8.2*   LDL   --   91   --    --   73   --    --   94   --   65   HDL   --   45*   --    --   46*   --    --    --    --   51   TRIG   --   306*   --    --   216*   --    --    --    --   235*   ALT   --    --   28  28   --   27   < >   --    --    --    CR   --    --   0.54  0.75   --   0.63   < >  0.53   --   0.70   GFRESTIMATED   --    --   >90  >60   --   >90   < >  >90  Non  GFR Calc     --   >60   GFRESTBLACK   --    --   >90  >60   --   >90   < >  >90   GFR Calc     --   >60   POTASSIUM   --    --   4.1  4.1   --   4.3   < >  3.8   --   4.2   TSH   --   0.55  0.29*  0.29*   --    --    < >   --    --    --     < > = values in this interval not displayed.      BP Readings from Last 3 Encounters:   09/28/18 128/80   09/12/18 132/72   08/20/18 132/80    Wt Readings from Last 3 Encounters:   09/28/18 (!) 335 lb (152 kg)   09/12/18 (!) 334 lb (151.5 kg)   08/20/18 (!) 334 lb (151.5 kg)                  Labs reviewed in EPIC    ROS:  Constitutional, HEENT, cardiovascular, pulmonary, gi and gu systems are negative, except as otherwise noted.    OBJECTIVE:     /80 (BP Location: Right arm, Patient Position: Chair, Cuff Size: Adult Regular)  Pulse 87  Temp 98.1  F (36.7  C) (Temporal)  Resp 16  Ht 5' 7\" (1.702 m)  Wt (!) 335 lb (152 kg)  SpO2 98%  BMI 52.47 kg/m2  Body mass index is 52.47 kg/(m^2).   Physical Exam   Constitutional: She is oriented to person, place, and time. She appears well-developed and well-nourished.   HENT:   Head: Normocephalic and atraumatic.   Cardiovascular: Normal rate, normal heart sounds and intact distal pulses.  Exam reveals no gallop.    No murmur " heard.  Abdominal:   Suprapubic pressure. No CVA tenderness   Neurological: She is alert and oriented to person, place, and time.   Psychiatric: She has a normal mood and affect.         Diagnostic Test Results:  none     ASSESSMENT/PLAN:     Problem List Items Addressed This Visit     Type 2 diabetes mellitus with hyperglycemia, without long-term current use of insulin (H)     Blood sugars still elevated. Advised to increase Basaglar to 40 units by increments of 2 units every other day  Continue glipizide and metformin. Does not wish to try other short acting insulin due to cost  Advised better Blood sugar control to avoid UTI's         Relevant Medications    BASAGLAR 100 UNIT/ML injection      Other Visit Diagnoses     UTI symptoms    -  Primary    Relevant Medications    ciprofloxacin (CIPRO) 250 MG tablet    Other Relevant Orders    *UA reflex to Microscopic and Culture ([facility] (except [place] and [place]) (Completed)    Urine Microscopic (Completed)         U/a positive for increased glucose and trace blood. Given her symptoms and the risk for a UTI given elevated blood glucose - will treat based on symptoms   Discussed home care  Reportable signs and symptoms discussed  RTC if symptoms persist or fail to improve    Mariana Overton MD  [facility]

## 2018-09-28 NOTE — ASSESSMENT & PLAN NOTE
Blood sugars still elevated. Advised to increase Basaglar to 40 units by increments of 2 units every other day  Continue glipizide and metformin. Does not wish to try other short acting insulin due to cost  Advised better Blood sugar control to avoid UTI's

## 2018-09-28 NOTE — MR AVS SNAPSHOT
After Visit Summary   9/28/2018    Yuki Boss    MRN: [id number]           Patient Information     Date Of Birth          1965        Visit Information        Provider Department      9/28/2018 3:00 PM Mariana Overton MD [facility]        Today's Diagnoses     UTI symptoms    -  1    Type 2 diabetes mellitus with hyperglycemia, without long-term current use of insulin (H)           Follow-ups after your visit        Follow-up notes from your care team     Return in about 1 month (around 10/28/2018).      Your next 10 appointments already scheduled     Oct 15, 2018  1:10 PM CDT   Office Visit with Roseline Whitten PA-C   [facility]    [address]  [facility] [id number] [phone]           Bring a current list of meds and any records pertaining to this visit. For Physicals, please bring immunization records and any forms needing to be filled out. Please arrive 10 minutes early to complete paperwork.            Nov 12, 2018 10:30 AM CST   New Visit with Jefry Nuno MD   [facility]    [address] [place] [id number] [phone]              Who to contact     If you have questions or need follow up information about today's clinic visit or your schedule please contact [facility] directly at [phone].  Normal or non-critical lab and imaging results will be communicated to you by MyChart, letter or phone within 4 business days after the clinic has received the results. If you do not hear from us within 7 days, please contact the clinic through MyChart or phone. If you have a critical or abnormal lab result, we will notify you by phone as soon as possible.  Submit refill requests through [employer] or call your pharmacy and they will forward the refill request to us. Please allow 3 business days for your refill to be completed.     "      Additional Information About Your Visit        MyChart Information     [employer] gives you secure access to your electronic health record. If you see a primary care provider, you can also send messages to your care team and make appointments. If you have questions, please call your primary care clinic.  If you do not have a primary care provider, please call [phone] and they will assist you.        Care EveryWhere ID     This is your Care EveryWhere ID. This could be used by other organizations to access your [facility] medical records  [id number]        Your Vitals Were     Pulse Temperature Respirations Height Pulse Oximetry BMI (Body Mass Index)    87 98.1  F (36.7  C) (Temporal) 16 5' 7\" (1.702 m) 98% 52.47 kg/m2       Blood Pressure from Last 3 Encounters:   09/28/18 128/80   09/12/18 132/72   08/20/18 132/80    Weight from Last 3 Encounters:   09/28/18 (!) 335 lb (152 kg)   09/12/18 (!) 334 lb (151.5 kg)   08/20/18 (!) 334 lb (151.5 kg)              We Performed the Following     *UA reflex to Microscopic and Culture ([place] and [facility] (except [place] and [place])     Urine Microscopic          Today's Medication Changes          These changes are accurate as of 9/28/18  4:03 PM.  If you have any questions, ask your nurse or doctor.               Start taking these medicines.        Dose/Directions    ciprofloxacin 250 MG tablet   Commonly known as:  CIPRO   Used for:  UTI symptoms   Started by:  Mariana Overton MD        Dose:  250 mg   Take 1 tablet (250 mg) by mouth 2 times daily   Quantity:  6 tablet   Refills:  0         These medicines have changed or have updated prescriptions.        Dose/Directions    BASAGLAR 100 UNIT/ML injection   This may have changed:  additional instructions   Used for:  Type 2 diabetes mellitus with hyperglycemia, without long-term current use of insulin (H)   Changed by:  Mariana Overton MD        Dose:  20 Units   Inject 20 Units Subcutaneous At " Bedtime Increase by 2 units every other night until you reach 40 units or if morning sugars are consistently less than 200 which ever happens earlier.   Quantity:  45 mL   Refills:  0            Where to get your medicines      These medications were sent to [facility] [address] [place] [address] [address] [facility]  [address] [place] [id number]     Phone:  [phone]     ciprofloxacin 250 MG tablet         Some of these will need a paper prescription and others can be bought over the counter.  Ask your nurse if you have questions.     Bring a paper prescription for each of these medications     BASAGLAR 100 UNIT/ML injection                Primary Care Provider Office Phone # Fax #    Roseline Whitten PA-C [phone]       [address] [place] [id number]        Equal Access to Services     NELSY SHORT : Zoe marquezo Sogamaliel, waaxda luqadaha, qaybta kaalmada adeegyada, cynthia mendez. So [facility] [phone].    ATENCIÓN: Si habla español, tiene a rose disposición servicios gratuitos de asistencia lingüística. Llame al [phone].    We comply with applicable federal civil rights laws and [place] laws. We do not discriminate on the basis of race, color, national origin, age, disability, sex, sexual orientation, or gender identity.            Thank you!     Thank you for choosing [facility]  for your care. Our goal is always to provide you with excellent care. Hearing back from our patients is one way we can continue to improve our services. Please take a few minutes to complete the written survey that you may receive in the mail after your visit with us. Thank you!             Your Updated Medication List - Protect others around you: Learn how to safely use, store and throw away your medicines at www.disposemymeds.org.          This list is accurate as of 9/28/18  4:03 PM.  Always use your most recent med list.                   Brand  Name Dispense Instructions for use Diagnosis    ACETAMINOPHEN PO      Take 500-1,000 mg by mouth every 8 hours as needed for pain        aspirin 81 MG tablet      Take by mouth daily        BASAGLAR 100 UNIT/ML injection     45 mL    Inject 20 Units Subcutaneous At Bedtime Increase by 2 units every other night until you reach 40 units or if morning sugars are consistently less than 200 which ever happens earlier.    Type 2 diabetes mellitus with hyperglycemia, without long-term current use of insulin (H)       blood glucose monitoring test strip    no brand specified     Use to test blood sugar 2 times daily or as directed.        cetirizine 10 MG tablet    zyrTEC     Take 10 mg by mouth daily        ciprofloxacin 250 MG tablet    CIPRO    6 tablet    Take 1 tablet (250 mg) by mouth 2 times daily    UTI symptoms       dexamethasone 1 MG tablet    DECADRON    1 tablet    1 mg between 11 pm - midnight    Weight gain       fluticasone 50 MCG/ACT spray    FLONASE    1 Bottle    Spray 1-2 sprays into both nostrils daily    Allergic rhinitis due to animal hair and dander, unspecified chronicity       glipiZIDE 10 MG tablet    GLUCOTROL    180 tablet    Take 1 tablet (10 mg) by mouth 2 times daily (before meals)    Type 2 diabetes mellitus with hyperglycemia, without long-term current use of insulin (H)       insulin pen needle 31G X 5 MM    B-D U/F    100 each    Use 1 daily or as directed.    Type 2 diabetes mellitus with hyperglycemia, without long-term current use of insulin (H)       lisinopril-hydrochlorothiazide 10-12.5 MG per tablet    PRINZIDE/ZESTORETIC    90 tablet    TAKE 1 TABLET BY MOUTH DAILY    Type 2 diabetes mellitus with hyperglycemia, without long-term current use of insulin (H)       metFORMIN 1000 MG tablet    GLUCOPHAGE    180 tablet    Take 1 tablet (1,000 mg) by mouth 2 times daily (with meals)    Type 2 diabetes mellitus with hyperglycemia, without long-term current use of insulin (H)        NICOTINE POLACRILEX MT      Take 4 mg by mouth Take 1/4 tablet several times a day        simvastatin 20 MG tablet    ZOCOR    90 tablet    TAKE 1 TABLET BY MOUTH AT BEDTIME    Mixed hyperlipidemia       TRUEPLUS SAFETY LANCETS 28G Misc           venlafaxine 75 MG tablet    EFFEXOR    180 tablet    TAKE 1 TABLET (75 MG) BY MOUTH 2 TIMES DAILY    Moderate episode of recurrent major depressive disorder (H)       VITAMIN D (CHOLECALCIFEROL) PO      Take 1,000 Units by mouth daily

## 2018-10-01 NOTE — TELEPHONE ENCOUNTER
Spoke with patient last week - she was supposed to send a [employer] message with her fasting morning blood sugar readings but ha not done this. Attempted to reach patient and phone went straight to voicemail. Left message to return call.

## 2018-10-02 NOTE — TELEPHONE ENCOUNTER
Called and spoke with patient regarding message below.  Patient did see RK on 9/28/18 and was given a plan for insulin.  Type 2 diabetes mellitus with hyperglycemia, without long-term current use of insulin (H)        Blood sugars still elevated. Advised to increase Basaglar to 40 units by increments of 2 units every other day  Continue glipizide and metformin. Does not wish to try other short acting insulin due to cost  Advised better Blood sugar control to avoid UTI's        Patient states her blood sugars have been    9/28- 280  9/29- 310  9/30- 299   Took 30 Units    10/- 278     Took 30  Units  10/2-286   Took 30  Units    Patient states she plans on going up to 32 units and will continue to go up till blood sugars are under 200.  Will route to  for review.    Eliz Cantu CMA ([facility])

## 2018-10-02 NOTE — TELEPHONE ENCOUNTER
Agree with plan to continue with increasing insulin.  Will see her at her upcoming appointment.     Roseline Whitten PA-C

## 2018-10-03 ENCOUNTER — MYC MEDICAL ADVICE (OUTPATIENT)
Dept: FAMILY MEDICINE | Facility: OTHER | Age: 53
End: 2018-10-03

## 2018-10-03 DIAGNOSIS — E78.2 MIXED HYPERLIPIDEMIA: ICD-10-CM

## 2018-10-03 RX ORDER — SIMVASTATIN 20 MG
20 TABLET ORAL AT BEDTIME
Qty: 90 TABLET | Refills: 2 | Status: SHIPPED | OUTPATIENT
Start: 2018-10-03 | End: 2019-07-03

## 2018-10-03 NOTE — TELEPHONE ENCOUNTER
"Requested Prescriptions   Pending Prescriptions Disp Refills     simvastatin (ZOCOR) 20 MG tablet 90 tablet 2     Sig: Take 1 tablet (20 mg) by mouth At Bedtime    [facility] Protocol Passed    10/3/2018 12:33 PM       Passed - LDL on file in past 12 months    Recent Labs   Lab Test  08/29/18   0742   LDL  91            Passed - No abnormal creatine kinase in past 12 months    No lab results found.            Passed - Recent (12 mo) or future (30 days) visit within the authorizing provider's specialty    Patient had office visit in the last 12 months or has a visit in the next 30 days with authorizing provider or within the authorizing provider's specialty.  See \"Patient Info\" tab in inbasket, or \"Choose Columns\" in Meds & Orders section of the refill encounter.           Passed - Patient is age 18 or older       Passed - No active pregnancy on record       Passed - No positive pregnancy test in past 12 months        simvastatin (ZOCOR) 20 MG tablet  Prescription approved per [facility] Refill Protocol.  Eva Perez, RN, BSN     "

## 2018-10-09 ENCOUNTER — MYC MEDICAL ADVICE (OUTPATIENT)
Dept: FAMILY MEDICINE | Facility: OTHER | Age: 53
End: 2018-10-09

## 2018-10-09 DIAGNOSIS — E11.65 TYPE 2 DIABETES MELLITUS WITH HYPERGLYCEMIA, WITHOUT LONG-TERM CURRENT USE OF INSULIN (H): ICD-10-CM

## 2018-10-09 RX ORDER — LISINOPRIL/HYDROCHLOROTHIAZIDE 10-12.5 MG
1 TABLET ORAL DAILY
Qty: 90 TABLET | Refills: 2 | Status: SHIPPED | OUTPATIENT
Start: 2018-10-09 | End: 2019-07-09

## 2018-10-09 NOTE — PROGRESS NOTES
SUBJECTIVE:   Yuki Boss is a 53 year old female who presents to clinic today for the following health issues:    HPI  Diabetes Follow-up    Patient is checking blood sugars: twice daily, once in the morning fasting and right before lunch sometimes.   Blood sugar testing frequency justification: Uncontrolled diabetes  Results are as follows:         am - mid 200s         lunchtime - 180s    Diabetic concerns: None and blood sugar frequently over 200     Symptoms of hypoglycemia (low blood sugar): none     Paresthesias (numbness or burning in feet) or sores: No     Date of last diabetic eye exam: December 2016-scheduled next week for one.     AM Fasting sugars:     34 Units - 278, 244    36 Units - 280, 273, 281    38 Units - 267, 256, 255    40 Units - 263, 307, 358, 376    BP Readings from Last 2 Encounters:   10/15/18 120/82   09/28/18 128/80     Hemoglobin A1C (%)   Date Value   09/12/2018 11.8 (H)   08/21/2018 11.9 (H)     LDL Cholesterol Calculated (mg/dL)   Date Value   08/29/2018 91   03/20/2018 73       Diabetes Management Resources  Problem list and histories reviewed & adjusted, as indicated.  Additional history: as documented    Patient Active Problem List   Diagnosis     Allergic rhinitis     Obesity, morbid (H)     Type 2 diabetes mellitus with hyperglycemia, without long-term current use of insulin (H)     Essential hypertension with goal blood pressure less than 140/90     Mixed hyperlipidemia     Spondylolisthesis of lumbar region     Left lumbar radiculitis     Moderate episode of recurrent major depressive disorder (H)     Physical deconditioning     Advanced care planning/counseling discussion     Astigmatism     Hypermetropia     Low TSH level     Low serum T4 level     MS (multiple sclerosis) (H)     Urinary incontinence, unspecified type     Past Surgical History:   Procedure Laterality Date     D & C  10/5/1990     HYSTERECTOMY, PAP NO LONGER INDICATED  2001    bilateral ovaries remain      TONSILLECTOMY         Social History   Substance Use Topics     Smoking status: Former Smoker     Packs/day: 1.00     Years: 10.00     Quit date: 2/24/2018     Smokeless tobacco: Never Used     Alcohol use No     Family History   Problem Relation Age of Onset     Thyroid Disease Mother      Hashimoto     Cancer Father      lung, smoker     Cancer Maternal Grandmother      pancreatic     Cancer Paternal Grandfather      lymphoma     Breast Cancer Maternal Aunt      Breast Cancer Paternal Grandmother          Current Outpatient Prescriptions   Medication Sig Dispense Refill     acetaminophen 500 MG CAPS Take 500-1,000 mg by mouth every 8 hours as needed (for pain) 180 capsule 1     aspirin 81 MG tablet Take 1 tablet (81 mg) by mouth daily 90 tablet 3     BASAGLAR 100 UNIT/ML injection Inject 20 Units Subcutaneous At Bedtime Increase by 2 units every other night until you reach 40 units or if morning sugars are consistently less than 200 which ever happens earlier. 45 mL 0     blood glucose monitoring (CONTOUR NEXT MONITOR W/DEVICE KIT) meter device kit Use to test blood sugar 2 times daily or as directed. 1 kit 0     blood glucose monitoring (NO BRAND SPECIFIED) test strip Use to test blood sugar 2 times daily or as directed. 100 each 11     cetirizine (ZYRTEC) 10 MG tablet Take 1 tablet (10 mg) by mouth every evening 90 tablet 3     docusate sodium (COLACE) 100 MG tablet Take 100 mg by mouth daily 180 tablet 1     fluticasone (FLONASE) 50 MCG/ACT spray Spray 1-2 sprays into both nostrils daily 3 Bottle 1     glipiZIDE (GLUCOTROL) 10 MG tablet Take 1 tablet (10 mg) by mouth 2 times daily (before meals) 180 tablet 1     Incontinence Supply Disposable (POISE PAD) PADS 1 pad 4 times daily 240 each 3     insulin pen needle (B-D U/F) 31G X 5 MM Use 1 daily or as directed. 100 each prn     lisinopril-hydrochlorothiazide (PRINZIDE/ZESTORETIC) 10-12.5 MG per tablet Take 1 tablet by mouth daily 90 tablet 2     metFORMIN  (GLUCOPHAGE) 1000 MG tablet Take 1 tablet (1,000 mg) by mouth 2 times daily (with meals) 180 tablet 1     NICOTINE POLACRILEX MT Take 4 mg by mouth Take 1/4 tablet several times a day       RELION LANCETS THIN 26G MISC 2 in 1 [employer]   25 G needle / 1.8 mm 100 each 11     simvastatin (ZOCOR) 20 MG tablet Take 1 tablet (20 mg) by mouth At Bedtime 90 tablet 2     venlafaxine (EFFEXOR) 75 MG tablet TAKE 1 TABLET (75 MG) BY MOUTH 2 TIMES DAILY 180 tablet 1     Vitamin D, Cholecalciferol, 1000 units TABS Take 1,000 Units by mouth daily 180 tablet 3       ROS:  Constitutional, HEENT, cardiovascular, pulmonary, gi and gu systems are negative, except as otherwise noted.    OBJECTIVE:     /82  Pulse 88  Temp 97.4  F (36.3  C) (Temporal)  Resp 16  Wt (!) 342 lb (155.1 kg)  SpO2 94%  BMI 53.56 kg/m2  Body mass index is 53.56 kg/(m^2).  GENERAL: healthy, alert and no distress  RESP: lungs clear to auscultation - no rales, rhonchi or wheezes  CV: regular rate and rhythm, normal S1 S2, no S3 or S4, no murmur, click or rub, no peripheral edema and peripheral pulses strong  MS: no gross musculoskeletal defects noted, no edema  SKIN: no suspicious lesions or rashes  PSYCH: mentation appears normal, affect normal/bright    Diagnostic Test Results:  none     ASSESSMENT/PLAN:       ICD-10-CM    1. Type 2 diabetes mellitus with hyperglycemia, without long-term current use of insulin (H) E11.65 blood glucose monitoring (CONTOUR NEXT MONITOR W/DEVICE KIT) meter device kit     blood glucose monitoring (NO BRAND SPECIFIED) test strip     RELION LANCETS THIN 26G MISC     Vitamin D, Cholecalciferol, 1000 units TABS     aspirin 81 MG tablet     acetaminophen 500 MG CAPS     docusate sodium (COLACE) 100 MG tablet     **A1C FUTURE 3mo   2. Allergic rhinitis, unspecified seasonality, unspecified trigger J30.9 cetirizine (ZYRTEC) 10 MG tablet     fluticasone (FLONASE) 50 MCG/ACT spray   3. Urinary incontinence, unspecified type  R32 Incontinence Supply Disposable (POISE PAD) PADS       Updated her medications today, needs OTC prescribed to be covered by their insurance.   Her blood sugar numbers have improved but still too high.  She is working on her diet modifications learned at her husbands session.      Recommended increasing Basaglar to 42 units and if sugars > 200 still then increase to 44 Units with update via [employer] for her numbers within 1-2 weeks.      Repeat A1c in 4 weeks, follow-up pending labs and blood sugar numbers.     Options for treatment and follow-up care were reviewed with the patient and/or guardian. Patient and/or guardian engaged in the decision making process and verbalized understanding of the options discussed and agreed with the final plan.     Roseline Whitten PA-C  [facility]

## 2018-10-09 NOTE — TELEPHONE ENCOUNTER
Prinzide:  Prescription approved per [facility] Refill Protocol.    Carmen Lambert, RN, BSN

## 2018-10-15 ENCOUNTER — OFFICE VISIT (OUTPATIENT)
Dept: FAMILY MEDICINE | Facility: OTHER | Age: 53
End: 2018-10-15
Payer: COMMERCIAL

## 2018-10-15 VITALS
WEIGHT: 293 LBS | DIASTOLIC BLOOD PRESSURE: 82 MMHG | BODY MASS INDEX: 53.56 KG/M2 | SYSTOLIC BLOOD PRESSURE: 120 MMHG | RESPIRATION RATE: 16 BRPM | HEART RATE: 88 BPM | TEMPERATURE: 97.4 F | OXYGEN SATURATION: 94 %

## 2018-10-15 DIAGNOSIS — J30.9 ALLERGIC RHINITIS, UNSPECIFIED SEASONALITY, UNSPECIFIED TRIGGER: ICD-10-CM

## 2018-10-15 DIAGNOSIS — E11.65 TYPE 2 DIABETES MELLITUS WITH HYPERGLYCEMIA, WITHOUT LONG-TERM CURRENT USE OF INSULIN (H): Primary | ICD-10-CM

## 2018-10-15 DIAGNOSIS — R32 URINARY INCONTINENCE, UNSPECIFIED TYPE: ICD-10-CM

## 2018-10-15 PROCEDURE — 99214 OFFICE O/P EST MOD 30 MIN: CPT | Performed by: PHYSICIAN ASSISTANT

## 2018-10-15 RX ORDER — FLUTICASONE PROPIONATE 50 MCG
1-2 SPRAY, SUSPENSION (ML) NASAL DAILY
Qty: 3 BOTTLE | Refills: 1 | Status: SHIPPED | OUTPATIENT
Start: 2018-10-15

## 2018-10-15 RX ORDER — DIAPER,BRIEF,ADULT, DISPOSABLE
1 EACH MISCELLANEOUS 4 TIMES DAILY
Qty: 240 EACH | Refills: 3 | Status: SHIPPED | OUTPATIENT
Start: 2018-10-15 | End: 2018-11-14

## 2018-10-15 RX ORDER — ASPIRIN 81 MG
100 TABLET, DELAYED RELEASE (ENTERIC COATED) ORAL DAILY
Qty: 180 TABLET | Refills: 1 | Status: SHIPPED | OUTPATIENT
Start: 2018-10-15

## 2018-10-15 RX ORDER — CETIRIZINE HYDROCHLORIDE 10 MG/1
10 TABLET ORAL EVERY EVENING
Qty: 90 TABLET | Refills: 3 | Status: SHIPPED | OUTPATIENT
Start: 2018-10-15

## 2018-10-15 RX ORDER — BLOOD-GLUCOSE METER
EACH MISCELLANEOUS
Qty: 1 KIT | Refills: 0 | Status: SHIPPED | OUTPATIENT
Start: 2018-10-15

## 2018-10-15 RX ORDER — MULTIVIT-MIN/IRON/FOLIC ACID/K 18-600-40
1000 CAPSULE ORAL DAILY
Qty: 180 TABLET | Refills: 3 | Status: SHIPPED | OUTPATIENT
Start: 2018-10-15

## 2018-10-15 RX ORDER — LANCETS 26 GAUGE
EACH MISCELLANEOUS
Qty: 100 EACH | Refills: 11 | Status: SHIPPED | OUTPATIENT
Start: 2018-10-15 | End: 2019-01-02

## 2018-10-15 ASSESSMENT — PAIN SCALES - GENERAL: PAINLEVEL: MODERATE PAIN (5)

## 2018-10-15 NOTE — MR AVS SNAPSHOT
After Visit Summary   10/15/2018    Yuki Boss    MRN: [id number]           Patient Information     Date Of Birth          1965        Visit Information        Provider Department      10/15/2018 1:10 PM Roseline Whitten PA-C [facility]        Today's Diagnoses     Type 2 diabetes mellitus with hyperglycemia, without long-term current use of insulin (H)    -  1    Allergic rhinitis, unspecified seasonality, unspecified trigger        Urinary incontinence, unspecified type          Care Instructions    - Go up to 42 units daily x 5-7 days and if still not < 200 increase to 42 and then MyChart me your numbers.     - repeat A1c in 4 weeks.             Follow-ups after your visit        Follow-up notes from your care team     Return in about 4 weeks (around 11/12/2018) for Lab Work.      Your next 10 appointments already scheduled     Nov 12, 2018 10:30 AM CST   New Visit with Jefry Nuno MD   [email or web address] ([facility] [[email or web address]    [address] [place] [address] [phone] [phone]            Nov 14, 2018 11:00 AM CST   LAB with NL LAB [facility] ([facility])    [address] [facility] [phone]           Please do not eat 10-12 hours before your appointment if you are coming in fasting for labs on lipids, cholesterol, or glucose (sugar). This does not apply to pregnant women. Water, hot tea and black coffee (with nothing added) are okay. Do not drink other fluids, diet soda or chew gum.              Future tests that were ordered for you today     Open Future Orders        Priority Expected Expires Ordered    **A1C FUTURE 3mo Routine 1/13/2019 2/12/2019 10/15/2018            Who to contact     If you have questions or need follow up information about today's clinic visit or your schedule please contact [facility] directly at [phone].  Normal or non-critical  lab and imaging results will be communicated to you by Blinkhart, letter or phone within 4 business days after the clinic has received the results. If you do not hear from us within 7 days, please contact the clinic through hulu or phone. If you have a critical or abnormal lab result, we will notify you by phone as soon as possible.  Submit refill requests through hulu or call your pharmacy and they will forward the refill request to us. Please allow 3 business days for your refill to be completed.          Additional Information About Your Visit        Blinkharimgix Information     hulu gives you secure access to your electronic health record. If you see a primary care provider, you can also send messages to your care team and make appointments. If you have questions, please call your primary care clinic.  If you do not have a primary care provider, please call [phone] and they will assist you.        Care EveryWhere ID     This is your Care EveryWhere ID. This could be used by other organizations to access your [facility] medical records  [id number]        Your Vitals Were     Pulse Temperature Respirations Pulse Oximetry BMI (Body Mass Index)       88 97.4  F (36.3  C) (Temporal) 16 94% 53.56 kg/m2        Blood Pressure from Last 3 Encounters:   10/15/18 120/82   09/28/18 128/80   09/12/18 132/72    Weight from Last 3 Encounters:   10/15/18 (!) 342 lb (155.1 kg)   09/28/18 (!) 335 lb (152 kg)   09/12/18 (!) 334 lb (151.5 kg)                 Today's Medication Changes          These changes are accurate as of 10/15/18  2:08 PM.  If you have any questions, ask your nurse or doctor.               Start taking these medicines.        Dose/Directions    blood glucose monitoring meter device kit   Used for:  Type 2 diabetes mellitus with hyperglycemia, without long-term current use of insulin (H)   Started by:  Roseline Whitten PA-C        Use to test blood sugar 2 times daily or as directed.   Quantity:  1 kit    Refills:  0       docusate sodium 100 MG tablet   Commonly known as:  COLACE   Used for:  Type 2 diabetes mellitus with hyperglycemia, without long-term current use of insulin (H)   Started by:  Roseline Whitten PA-C        Dose:  100 mg   Take 100 mg by mouth daily   Quantity:  180 tablet   Refills:  1       POISE PAD Pads   Used for:  Urinary incontinence, unspecified type   Started by:  Roseline Whitten PA-C        Dose:  1 pad   1 pad 4 times daily   Quantity:  240 each   Refills:  3         These medicines have changed or have updated prescriptions.        Dose/Directions    acetaminophen 500 MG Caps   This may have changed:    - medication strength  - reasons to take this   Used for:  Type 2 diabetes mellitus with hyperglycemia, without long-term current use of insulin (H)   Changed by:  Roseline Whitten PA-C        Dose:  500-1000 mg   Take 500-1,000 mg by mouth every 8 hours as needed (for pain)   Quantity:  180 capsule   Refills:  1       aspirin 81 MG tablet   This may have changed:  how much to take   Used for:  Type 2 diabetes mellitus with hyperglycemia, without long-term current use of insulin (H)   Changed by:  Roseline Whitten PA-C        Dose:  81 mg   Take 1 tablet (81 mg) by mouth daily   Quantity:  90 tablet   Refills:  3       blood glucose monitoring test strip   Commonly known as:  no brand specified   This may have changed:  how to take this   Used for:  Type 2 diabetes mellitus with hyperglycemia, without long-term current use of insulin (H)   Changed by:  Roseline Whitten PA-C        Use to test blood sugar 2 times daily or as directed.   Quantity:  100 each   Refills:  11       cetirizine 10 MG tablet   Commonly known as:  zyrTEC   This may have changed:  when to take this   Used for:  Allergic rhinitis, unspecified seasonality, unspecified trigger   Changed by:  Roseline Whitten PA-C        Dose:  10 mg   Take 1 tablet (10 mg) by mouth every evening   Quantity:  90 tablet   Refills:  3       RELION  LANCETS THIN 26G Misc   This may have changed:    - how to take this  - additional instructions   Used for:  Type 2 diabetes mellitus with hyperglycemia, without long-term current use of insulin (H)   Changed by:  Roseline Whitten PA-C        2 in 1 Lancet Devices   25 G needle / 1.8 mm   Quantity:  100 each   Refills:  11       Vitamin D (Cholecalciferol) 1000 units Tabs   This may have changed:  medication strength   Used for:  Type 2 diabetes mellitus with hyperglycemia, without long-term current use of insulin (H)   Changed by:  Roseline Whitten PA-C        Dose:  1000 Units   Take 1,000 Units by mouth daily   Quantity:  180 tablet   Refills:  3         Stop taking these medicines if you haven't already. Please contact your care team if you have questions.     ciprofloxacin 250 MG tablet   Commonly known as:  CIPRO   Stopped by:  Roseline Whitten PA-C           dexamethasone 1 MG tablet   Commonly known as:  DECADRON   Stopped by:  Roseline Whitten PA-C                Where to get your medicines      These medications were sent to [facility] [id number]     Phone:  [phone]     acetaminophen 500 MG Caps    aspirin 81 MG tablet    blood glucose monitoring meter device kit    blood glucose monitoring test strip    cetirizine 10 MG tablet    docusate sodium 100 MG tablet    fluticasone 50 MCG/ACT spray    POISE PAD Pads    RELION LANCETS THIN 26G Misc    Vitamin D (Cholecalciferol) 1000 units Tabs                Primary Care Provider Office Phone # Fax #    Roseline Whitten PA-C [phone]       [address] [place] [id number]        Equal Access to Services     [facility] AH: Hadii aad salbador hadasho Soomaali, waaxda luqadaha, qaybta kaalmada adeegyada, cynthia mendez. So [facility] [phone].    ATENCIÓN: Si habla español, tiene a rose disposición servicios gratuitos de asistencia lingüística. Llame al  [phone].    We comply with applicable federal civil rights laws and [place] laws. We do not discriminate on the basis of race, color, national origin, age, disability, sex, sexual orientation, or gender identity.            Thank you!     Thank you for choosing [facility]  for your care. Our goal is always to provide you with excellent care. Hearing back from our patients is one way we can continue to improve our services. Please take a few minutes to complete the written survey that you may receive in the mail after your visit with us. Thank you!             Your Updated Medication List - Protect others around you: Learn how to safely use, store and throw away your medicines at www.disposemymeds.org.          This list is accurate as of 10/15/18  2:08 PM.  Always use your most recent med list.                   Brand Name Dispense Instructions for use Diagnosis    acetaminophen 500 MG Caps     180 capsule    Take 500-1,000 mg by mouth every 8 hours as needed (for pain)    Type 2 diabetes mellitus with hyperglycemia, without long-term current use of insulin (H)       aspirin 81 MG tablet     90 tablet    Take 1 tablet (81 mg) by mouth daily    Type 2 diabetes mellitus with hyperglycemia, without long-term current use of insulin (H)       BASAGLAR 100 UNIT/ML injection     45 mL    Inject 20 Units Subcutaneous At Bedtime Increase by 2 units every other night until you reach 40 units or if morning sugars are consistently less than 200 which ever happens earlier.    Type 2 diabetes mellitus with hyperglycemia, without long-term current use of insulin (H)       blood glucose monitoring meter device kit     1 kit    Use to test blood sugar 2 times daily or as directed.    Type 2 diabetes mellitus with hyperglycemia, without long-term current use of insulin (H)       blood glucose monitoring test strip    no brand specified    100 each    Use to test blood sugar 2 times daily or as directed.    Type  2 diabetes mellitus with hyperglycemia, without long-term current use of insulin (H)       cetirizine 10 MG tablet    zyrTEC    90 tablet    Take 1 tablet (10 mg) by mouth every evening    Allergic rhinitis, unspecified seasonality, unspecified trigger       docusate sodium 100 MG tablet    COLACE    180 tablet    Take 100 mg by mouth daily    Type 2 diabetes mellitus with hyperglycemia, without long-term current use of insulin (H)       fluticasone 50 MCG/ACT spray    FLONASE    3 Bottle    Spray 1-2 sprays into both nostrils daily    Allergic rhinitis, unspecified seasonality, unspecified trigger       glipiZIDE 10 MG tablet    GLUCOTROL    180 tablet    Take 1 tablet (10 mg) by mouth 2 times daily (before meals)    Type 2 diabetes mellitus with hyperglycemia, without long-term current use of insulin (H)       insulin pen needle 31G X 5 MM    B-D U/F    100 each    Use 1 daily or as directed.    Type 2 diabetes mellitus with hyperglycemia, without long-term current use of insulin (H)       lisinopril-hydrochlorothiazide 10-12.5 MG per tablet    PRINZIDE/ZESTORETIC    90 tablet    Take 1 tablet by mouth daily    Type 2 diabetes mellitus with hyperglycemia, without long-term current use of insulin (H)       metFORMIN 1000 MG tablet    GLUCOPHAGE    180 tablet    Take 1 tablet (1,000 mg) by mouth 2 times daily (with meals)    Type 2 diabetes mellitus with hyperglycemia, without long-term current use of insulin (H)       NICOTINE POLACRILEX MT      Take 4 mg by mouth Take 1/4 tablet several times a day        POISE PAD Pads     240 each    1 pad 4 times daily    Urinary incontinence, unspecified type       RELION LANCETS THIN 26G Misc     100 each    2 in 1 Lancet Devices   25 G needle / 1.8 mm    Type 2 diabetes mellitus with hyperglycemia, without long-term current use of insulin (H)       simvastatin 20 MG tablet    ZOCOR    90 tablet    Take 1 tablet (20 mg) by mouth At Bedtime    Mixed hyperlipidemia        venlafaxine 75 MG tablet    EFFEXOR    180 tablet    TAKE 1 TABLET (75 MG) BY MOUTH 2 TIMES DAILY    Moderate episode of recurrent major depressive disorder (H)       Vitamin D (Cholecalciferol) 1000 units Tabs     180 tablet    Take 1,000 Units by mouth daily    Type 2 diabetes mellitus with hyperglycemia, without long-term current use of insulin (H)

## 2018-10-15 NOTE — PATIENT INSTRUCTIONS
- Go up to 42 units daily x 5-7 days and if still not < 200 increase to 42 and then MyChart me your numbers.     - repeat A1c in 4 weeks.

## 2018-10-22 ENCOUNTER — MYC MEDICAL ADVICE (OUTPATIENT)
Dept: FAMILY MEDICINE | Facility: OTHER | Age: 53
End: 2018-10-22

## 2018-10-22 DIAGNOSIS — F33.1 MODERATE EPISODE OF RECURRENT MAJOR DEPRESSIVE DISORDER (H): ICD-10-CM

## 2018-10-22 RX ORDER — VENLAFAXINE 75 MG/1
TABLET ORAL
Qty: 180 TABLET | Refills: 1 | Status: SHIPPED | OUTPATIENT
Start: 2018-10-22 | End: 2019-04-05

## 2018-10-22 NOTE — TELEPHONE ENCOUNTER
Effexor sent to pharmacy.     Blood sugars are improving.  Increased Insulin to 46 Units daily.  Will have her send sugars in 1 week.     Roseline Whitten PA-C

## 2018-10-25 ENCOUNTER — TRANSFERRED RECORDS (OUTPATIENT)
Dept: HEALTH INFORMATION MANAGEMENT | Facility: CLINIC | Age: 53
End: 2018-10-25

## 2018-10-25 LAB — RETINOPATHY: NEGATIVE

## 2018-11-13 ENCOUNTER — MYC MEDICAL ADVICE (OUTPATIENT)
Dept: FAMILY MEDICINE | Facility: OTHER | Age: 53
End: 2018-11-13

## 2018-11-14 DIAGNOSIS — E11.65 TYPE 2 DIABETES MELLITUS WITH HYPERGLYCEMIA, WITHOUT LONG-TERM CURRENT USE OF INSULIN (H): ICD-10-CM

## 2018-11-14 LAB — HBA1C MFR BLD: 10.8 % (ref 0–5.6)

## 2018-11-14 PROCEDURE — 83036 HEMOGLOBIN GLYCOSYLATED A1C: CPT | Performed by: PHYSICIAN ASSISTANT

## 2018-11-14 PROCEDURE — 36415 COLL VENOUS BLD VENIPUNCTURE: CPT | Performed by: PHYSICIAN ASSISTANT

## 2018-11-30 ENCOUNTER — MYC MEDICAL ADVICE (OUTPATIENT)
Dept: FAMILY MEDICINE | Facility: OTHER | Age: 53
End: 2018-11-30

## 2018-11-30 DIAGNOSIS — E11.65 TYPE 2 DIABETES MELLITUS WITH HYPERGLYCEMIA, WITHOUT LONG-TERM CURRENT USE OF INSULIN (H): Primary | ICD-10-CM

## 2018-12-03 ENCOUNTER — MYC MEDICAL ADVICE (OUTPATIENT)
Dept: FAMILY MEDICINE | Facility: OTHER | Age: 53
End: 2018-12-03

## 2018-12-03 NOTE — TELEPHONE ENCOUNTER
Next 5 appointments (look out 90 days)     Dec 04, 2018 10:40 AM CST   Office Visit with Roseline Whitten PA-C   [facility]    [address] [id number]   [phone]            Jan 09, 2019 11:00 AM CST   Office Visit with Roseline Whitten PA-C   [facility] ([facility])    [address] [id number]   [phone]                Carmen Lambert, RN, BSN

## 2018-12-03 NOTE — PROGRESS NOTES
SUBJECTIVE:   Yuki Boss is a 53 year old female who presents to clinic today for the following health issues:    HPI  URINARY TRACT SYMPTOMS  Onset: 4 days    Description:   Painful urination (Dysuria): YES  Blood in urine (Hematuria): no   Delay in urine (Hesitency): no     Intensity: moderate    Progression of Symptoms:  Better because of AZO    Accompanying Signs & Symptoms:  Fever/chills: YES- sweats  Flank pain no   Nausea and vomiting: YES- nausea  Any vaginal symptoms: none  Abdominal/Pelvic Pain: YES    History:   History of frequent UTI's: YES- mild  History of kidney stones: no   Sexually Active: no   Possibility of pregnancy: No    Precipitating factors:   None    Therapies Tried and outcome: AZO    Her blood sugars have been as follows:  Fasting  190-275     Pre Lunch  118-241   Post Meal   349     She continues to take her Basaglar regularly, trying to work on improving her diet.   They are scheduled with diabetes education through [facility] on December 19th.       Problem list and histories reviewed & adjusted, as indicated.  Additional history: as documented    Patient Active Problem List   Diagnosis     Allergic rhinitis     Obesity, morbid (H)     Type 2 diabetes mellitus with hyperglycemia, without long-term current use of insulin (H)     Essential hypertension with goal blood pressure less than 140/90     Mixed hyperlipidemia     Spondylolisthesis of lumbar region     Left lumbar radiculitis     Moderate episode of recurrent major depressive disorder (H)     Physical deconditioning     Advanced care planning/counseling discussion     Astigmatism     Hypermetropia     Low TSH level     Low serum T4 level     MS (multiple sclerosis) (H)     Urinary incontinence, unspecified type     Past Surgical History:   Procedure Laterality Date     D & C  10/5/1990     HYSTERECTOMY, PAP NO LONGER INDICATED  2001    bilateral ovaries remain     TONSILLECTOMY         Social History   Substance Use  Topics     Smoking status: Former Smoker     Packs/day: 1.00     Years: 10.00     Quit date: 2/24/2018     Smokeless tobacco: Never Used     Alcohol use No     Family History   Problem Relation Age of Onset     Thyroid Disease Mother      Hashimoto     Cancer Father      lung, smoker     Cancer Maternal Grandmother      pancreatic     Cancer Paternal Grandfather      lymphoma     Breast Cancer Maternal Aunt      Breast Cancer Paternal Grandmother          Current Outpatient Prescriptions   Medication Sig Dispense Refill     acetaminophen 500 MG CAPS Take 500-1,000 mg by mouth every 8 hours as needed (for pain) 180 capsule 1     aspirin 81 MG tablet Take 1 tablet (81 mg) by mouth daily 90 tablet 3     BASAGLAR 100 UNIT/ML injection Inject 20 Units Subcutaneous At Bedtime Increase by 2 units every other night until you reach 40 units or if morning sugars are consistently less than 200 which ever happens earlier. 45 mL 0     blood glucose monitoring (CONTOUR NEXT MONITOR W/DEVICE KIT) meter device kit Use to test blood sugar 2 times daily or as directed. 1 kit 0     blood glucose monitoring (NO BRAND SPECIFIED) test strip Use to test blood sugar 2 times daily or as directed. 100 each 11     cetirizine (ZYRTEC) 10 MG tablet Take 1 tablet (10 mg) by mouth every evening 90 tablet 3     docusate sodium (COLACE) 100 MG tablet Take 100 mg by mouth daily 180 tablet 1     fluticasone (FLONASE) 50 MCG/ACT spray Spray 1-2 sprays into both nostrils daily 3 Bottle 1     glipiZIDE (GLUCOTROL) 10 MG tablet Take 1 tablet (10 mg) by mouth 2 times daily (before meals) 180 tablet 1     insulin pen needle (B-D U/F) 31G X 5 MM Use 1 daily or as directed. 100 each prn     lisinopril-hydrochlorothiazide (PRINZIDE/ZESTORETIC) 10-12.5 MG per tablet Take 1 tablet by mouth daily 90 tablet 2     metFORMIN (GLUCOPHAGE) 1000 MG tablet Take 1 tablet (1,000 mg) by mouth 2 times daily (with meals) 180 tablet 1     NICOTINE POLACRILEX MT Take 4 mg by  mouth Take 1/4 tablet several times a day       nitroFURantoin macrocrystal-monohydrate (MACROBID) 100 MG capsule Take 1 capsule (100 mg) by mouth 2 times daily for 5 days 10 capsule 0     RELION LANCETS THIN 26G MISC 2 in 1 Lancet Devices   25 G needle / 1.8 mm 100 each 11     simvastatin (ZOCOR) 20 MG tablet Take 1 tablet (20 mg) by mouth At Bedtime 90 tablet 2     venlafaxine (EFFEXOR) 75 MG tablet TAKE 1 TABLET (75 MG) BY MOUTH 2 TIMES DAILY 180 tablet 1     Vitamin D, Cholecalciferol, 1000 units TABS Take 1,000 Units by mouth daily 180 tablet 3       ROS:  Constitutional, HEENT, cardiovascular, pulmonary, gi and gu systems are negative, except as otherwise noted.    OBJECTIVE:     /68  Pulse 100  Temp 96.6  F (35.9  C) (Temporal)  Resp 18  Wt 330 lb 9.6 oz (150 kg)  SpO2 97%  BMI 51.78 kg/m2  Body mass index is 51.78 kg/(m^2).  GENERAL: healthy, alert and no distress  RESP: lungs clear to auscultation - no rales, rhonchi or wheezes  CV: regular rate and rhythm, normal S1 S2, no S3 or S4, no murmur, click or rub, no peripheral edema and peripheral pulses strong  MS: no gross musculoskeletal defects noted, no edema  PSYCH: mentation appears normal, affect normal/bright    Diagnostic Test Results:  Results for orders placed or performed in visit on 12/04/18 (from the past 24 hour(s))   *UA reflex to Microscopic and Culture ([facility] (except [place] and [place])   Result Value Ref Range    Color Urine Yellow     Appearance Urine Clear     Glucose Urine >=1000 (A) NEG^Negative mg/dL    Bilirubin Urine Negative NEG^Negative    Ketones Urine Negative NEG^Negative mg/dL    Specific Gravity Urine 1.015 1.003 - 1.035    Blood Urine Negative NEG^Negative    pH Urine 5.5 5.0 - 7.0 pH    Protein Albumin Urine Negative NEG^Negative mg/dL    Urobilinogen Urine 0.2 0.2 - 1.0 EU/dL    Nitrite Urine Positive (A) NEG^Negative    Leukocyte Esterase Urine Negative NEG^Negative    Source Midstream  Urine    Urine Microscopic   Result Value Ref Range    WBC Urine 0 - 5 OTO5^0 - 5 /HPF    RBC Urine O - 2 OTO2^O - 2 /HPF    Squamous Epithelial /LPF Urine Few FEW^Few /LPF    Bacteria Urine Few (A) NEG^Negative /HPF       ASSESSMENT/PLAN:       ICD-10-CM    1. Acute cystitis without hematuria N30.00 nitroFURantoin macrocrystal-monohydrate (MACROBID) 100 MG capsule   2. Dysuria R30.0 *UA reflex to Microscopic and Culture ([facility] (except [place] and [place])     Urine Microscopic     **HIV Antigen Antibody Combo FUTURE anytime     CANCELED: HIV Screening   3. Nonspecific finding on examination of urine R82.90 Urine Culture Aerobic Bacterial       Discussed her UA is positive for nitrites and given her uncontrolled diabetes is at increased risk for infection.  Started her on antibiotics at this time.  Will have her continue Basaglar.  The goal is after the first of the year to get patient back on Victoza as this is what controlled her DM in the past and supposedly her insurance covers it better that she will have starting the beginning of the year.  For now see the diabetes educator to have improvement in diet.      Follow-up in January as planned, sooner if sugars are getting persistently higher, recurrent infections or new concerns.     Options for treatment and follow-up care were reviewed with the patient and/or guardian. Patient and/or guardian engaged in the decision making process and verbalized understanding of the options discussed and agreed with the final plan.     Roseline Whitten PA-C  [facility]

## 2018-12-04 ENCOUNTER — OFFICE VISIT (OUTPATIENT)
Dept: FAMILY MEDICINE | Facility: OTHER | Age: 53
End: 2018-12-04
Payer: COMMERCIAL

## 2018-12-04 VITALS
DIASTOLIC BLOOD PRESSURE: 68 MMHG | HEART RATE: 100 BPM | OXYGEN SATURATION: 97 % | TEMPERATURE: 96.6 F | RESPIRATION RATE: 18 BRPM | SYSTOLIC BLOOD PRESSURE: 118 MMHG | BODY MASS INDEX: 51.78 KG/M2 | WEIGHT: 293 LBS

## 2018-12-04 DIAGNOSIS — R82.90 NONSPECIFIC FINDING ON EXAMINATION OF URINE: ICD-10-CM

## 2018-12-04 DIAGNOSIS — R30.0 DYSURIA: ICD-10-CM

## 2018-12-04 DIAGNOSIS — N30.00 ACUTE CYSTITIS WITHOUT HEMATURIA: Primary | ICD-10-CM

## 2018-12-04 LAB
ALBUMIN UR-MCNC: NEGATIVE MG/DL
APPEARANCE UR: CLEAR
BACTERIA #/AREA URNS HPF: ABNORMAL /HPF
BILIRUB UR QL STRIP: NEGATIVE
COLOR UR AUTO: YELLOW
GLUCOSE UR STRIP-MCNC: >=1000 MG/DL
HGB UR QL STRIP: NEGATIVE
KETONES UR STRIP-MCNC: NEGATIVE MG/DL
LEUKOCYTE ESTERASE UR QL STRIP: NEGATIVE
NITRATE UR QL: POSITIVE
NON-SQ EPI CELLS #/AREA URNS LPF: ABNORMAL /LPF
PH UR STRIP: 5.5 PH (ref 5–7)
RBC #/AREA URNS AUTO: ABNORMAL /HPF
SOURCE: ABNORMAL
SP GR UR STRIP: 1.01 (ref 1–1.03)
UROBILINOGEN UR STRIP-ACNC: 0.2 EU/DL (ref 0.2–1)
WBC #/AREA URNS AUTO: ABNORMAL /HPF

## 2018-12-04 PROCEDURE — 87086 URINE CULTURE/COLONY COUNT: CPT | Performed by: PHYSICIAN ASSISTANT

## 2018-12-04 PROCEDURE — 99214 OFFICE O/P EST MOD 30 MIN: CPT | Performed by: PHYSICIAN ASSISTANT

## 2018-12-04 PROCEDURE — 81001 URINALYSIS AUTO W/SCOPE: CPT | Performed by: PHYSICIAN ASSISTANT

## 2018-12-04 RX ORDER — NITROFURANTOIN 25; 75 MG/1; MG/1
100 CAPSULE ORAL 2 TIMES DAILY
Qty: 10 CAPSULE | Refills: 0 | Status: SHIPPED | OUTPATIENT
Start: 2018-12-04 | End: 2018-12-09

## 2018-12-04 ASSESSMENT — PAIN SCALES - GENERAL: PAINLEVEL: MODERATE PAIN (5)

## 2018-12-04 NOTE — MR AVS SNAPSHOT
After Visit Summary   12/4/2018    Yuki Boss    MRN: [id number]           Patient Information     Date Of Birth          1965        Visit Information        Provider Department      12/4/2018 10:40 AM Roseline Whitten PA-C [facility]        Today's Diagnoses     Acute cystitis without hematuria    -  1    Dysuria        Nonspecific finding on examination of urine           Follow-ups after your visit        Follow-up notes from your care team     Return in about 4 weeks (around 1/1/2019) for Medication Re-check.      Your next 10 appointments already scheduled     Jan 09, 2019 10:30 AM CST   LAB with [facility]   [facility] ([facility]    [address] [facility] [id number] [phone]           Please do not eat 10-12 hours before your appointment if you are coming in fasting for labs on lipids, cholesterol, or glucose (sugar). This does not apply to pregnant women. Water, hot tea and black coffee (with nothing added) are okay. Do not drink other fluids, diet soda or chew gum.            Jan 09, 2019 11:00 AM CST   Office Visit with Roseline Whitten PA-C   [facility] ([facility])    [address] [id number] [phone]           Bring a current list of meds and any records pertaining to this visit. For Physicals, please bring immunization records and any forms needing to be filled out. Please arrive 10 minutes early to complete paperwork.              Future tests that were ordered for you today     Open Future Orders        Priority Expected Expires Ordered    **HIV Antigen Antibody Combo FUTURE anytime Routine 12/4/2018 12/4/2019 12/4/2018            Who to contact     If you have questions or need follow up information about today's clinic visit or your schedule please contact [facility] [place] directly at [phone].  Normal or non-critical lab and imaging results  will be communicated to you by PluggedInhart, letter or phone within 4 business days after the clinic has received the results. If you do not hear from us within 7 days, please contact the clinic through Flare3d or phone. If you have a critical or abnormal lab result, we will notify you by phone as soon as possible.  Submit refill requests through Flare3d or call your pharmacy and they will forward the refill request to us. Please allow 3 business days for your refill to be completed.          Additional Information About Your Visit        Flare3d Information     Flare3d gives you secure access to your electronic health record. If you see a primary care provider, you can also send messages to your care team and make appointments. If you have questions, please call your primary care clinic.  If you do not have a primary care provider, please call [phone] and they will assist you.        Care EveryWhere ID     This is your Care EveryWhere ID. This could be used by other organizations to access your [place] medical records  [id number]        Your Vitals Were     Pulse Temperature Respirations Pulse Oximetry BMI (Body Mass Index)       100 96.6  F (35.9  C) (Temporal) 18 97% 51.78 kg/m2        Blood Pressure from Last 3 Encounters:   12/04/18 118/68   10/15/18 120/82   09/28/18 128/80    Weight from Last 3 Encounters:   12/04/18 330 lb 9.6 oz (150 kg)   10/15/18 (!) 342 lb (155.1 kg)   09/28/18 (!) 335 lb (152 kg)              We Performed the Following     *UA reflex to Microscopic and Culture ([place] and [facility] (except [place] and [place])     FOOT EXAM  NO CHARGE [[id number]]     Urine Culture Aerobic Bacterial     Urine Microscopic          Today's Medication Changes          These changes are accurate as of 12/4/18  1:39 PM.  If you have any questions, ask your nurse or doctor.               Start taking these medicines.        Dose/Directions    nitroFURantoin macrocrystal-monohydrate 100 MG capsule    Commonly known as:  MACROBID   Used for:  Acute cystitis without hematuria   Started by:  Roseline Whitten PA-C        Dose:  100 mg   Take 1 capsule (100 mg) by mouth 2 times daily for 5 days   Quantity:  10 capsule   Refills:  0            Where to get your medicines      These [place] [facility] [place] [address] [facility]  [address] [facility] [address]     Phone:  [phone]     nitroFURantoin macrocrystal-monohydrate 100 MG capsule                Primary Care Provider Office Phone # Fax #    Roseline Whitten PA-C [phone]       [address] [facility] [address]        Equal Access to Services     NELSY SHORT : Zoe lopez Sogamaliel, waelizabethda rosannaadaha, qaybta kaalmada salinasyamaggy, cynthia mendez. So [facility] [phone].    ATENCIÓN: Si habla español, tiene a rose disposición servicios gratuitos de asistencia lingüística. Llame al [phone].    We comply with applicable federal civil rights laws and [place] laws. We do not discriminate on the basis of race, color, national origin, age, disability, sex, sexual orientation, or gender identity.            Thank you!     Thank you for choosing [facility]  for your care. Our goal is always to provide you with excellent care. Hearing back from our patients is one way we can continue to improve our services. Please take a few minutes to complete the written survey that you may receive in the mail after your visit with us. Thank you!             Your Updated Medication List - Protect others around you: Learn how to safely use, store and throw away your medicines at www.disposemymeds.org.          This list is accurate as of 12/4/18  1:39 PM.  Always use your most recent med list.                   Brand Name Dispense Instructions for use Diagnosis    acetaminophen 500 MG Caps     180 capsule    Take 500-1,000 mg by mouth every 8 hours as needed (for pain)    Type  2 diabetes mellitus with hyperglycemia, without long-term current use of insulin (H)       aspirin 81 MG tablet    ASA    90 tablet    Take 1 tablet (81 mg) by mouth daily    Type 2 diabetes mellitus with hyperglycemia, without long-term current use of insulin (H)       blood glucose monitoring meter device kit     1 kit    Use to test blood sugar 2 times daily or as directed.    Type 2 diabetes mellitus with hyperglycemia, without long-term current use of insulin (H)       blood glucose monitoring test strip    NO BRAND SPECIFIED    100 each    Use to test blood sugar 2 times daily or as directed.    Type 2 diabetes mellitus with hyperglycemia, without long-term current use of insulin (H)       cetirizine 10 MG tablet    zyrTEC    90 tablet    Take 1 tablet (10 mg) by mouth every evening    Allergic rhinitis, unspecified seasonality, unspecified trigger       docusate sodium 100 MG tablet    COLACE    180 tablet    Take 100 mg by mouth daily    Type 2 diabetes mellitus with hyperglycemia, without long-term current use of insulin (H)       fluticasone 50 MCG/ACT nasal spray    FLONASE    3 Bottle    Spray 1-2 sprays into both nostrils daily    Allergic rhinitis, unspecified seasonality, unspecified trigger       glipiZIDE 10 MG tablet    GLUCOTROL    180 tablet    Take 1 tablet (10 mg) by mouth 2 times daily (before meals)    Type 2 diabetes mellitus with hyperglycemia, without long-term current use of insulin (H)       insulin glargine 100 UNIT/ML pen     45 mL    Inject 20 Units Subcutaneous At Bedtime Increase by 2 units every other night until you reach 40 units or if morning sugars are consistently less than 200 which ever happens earlier.    Type 2 diabetes mellitus with hyperglycemia, without long-term current use of insulin (H)       insulin pen needle 31G X 5 MM miscellaneous    B-D U/F    100 each    Use 1 daily or as directed.    Type 2 diabetes mellitus with hyperglycemia, without long-term current use  of insulin (H)       lisinopril-hydrochlorothiazide 10-12.5 MG tablet    PRINZIDE/ZESTORETIC    90 tablet    Take 1 tablet by mouth daily    Type 2 diabetes mellitus with hyperglycemia, without long-term current use of insulin (H)       metFORMIN 1000 MG tablet    GLUCOPHAGE    180 tablet    Take 1 tablet (1,000 mg) by mouth 2 times daily (with meals)    Type 2 diabetes mellitus with hyperglycemia, without long-term current use of insulin (H)       NICOTINE POLACRILEX MT      Take 4 mg by mouth Take 1/4 tablet several times a day        nitroFURantoin macrocrystal-monohydrate 100 MG capsule    MACROBID    10 capsule    Take 1 capsule (100 mg) by mouth 2 times daily for 5 days    Acute cystitis without hematuria       RELION LANCETS THIN 26G Misc     100 each    2 in 1 Lancet Devices   25 G needle / 1.8 mm    Type 2 diabetes mellitus with hyperglycemia, without long-term current use of insulin (H)       simvastatin 20 MG tablet    ZOCOR    90 tablet    Take 1 tablet (20 mg) by mouth At Bedtime    Mixed hyperlipidemia       venlafaxine 75 MG tablet    EFFEXOR    180 tablet    TAKE 1 TABLET (75 MG) BY MOUTH 2 TIMES DAILY    Moderate episode of recurrent major depressive disorder (H)       Vitamin D (Cholecalciferol) 1000 units Tabs     180 tablet    Take 1,000 Units by mouth daily    Type 2 diabetes mellitus with hyperglycemia, without long-term current use of insulin (H)

## 2018-12-05 LAB
BACTERIA SPEC CULT: NORMAL
BACTERIA SPEC CULT: NORMAL
SPECIMEN SOURCE: NORMAL

## 2018-12-11 ENCOUNTER — MYC MEDICAL ADVICE (OUTPATIENT)
Dept: FAMILY MEDICINE | Facility: OTHER | Age: 53
End: 2018-12-11

## 2018-12-11 DIAGNOSIS — E11.65 TYPE 2 DIABETES MELLITUS WITH HYPERGLYCEMIA, WITHOUT LONG-TERM CURRENT USE OF INSULIN (H): Primary | ICD-10-CM

## 2018-12-11 NOTE — TELEPHONE ENCOUNTER
Requested medications & pharmacy pended will forward to have Mary Whitt review and approve if appropriate.      insulin degludec (TRESIBA) 200 UNIT/ML pen discontinued as of 08/20/18 per patient.    liraglutide (VICTOZA PEN) 18 MG/3ML soln listed as discontinued as of 04/10/18

## 2018-12-12 RX ORDER — LIRAGLUTIDE 6 MG/ML
0.6 INJECTION SUBCUTANEOUS DAILY
Qty: 9 ML | Refills: 1 | Status: SHIPPED | OUTPATIENT
Start: 2018-12-12 | End: 2019-01-09 | Stop reason: DRUGHIGH

## 2018-12-12 NOTE — TELEPHONE ENCOUNTER
Not sure why this was forwarded to me, I have not seen patient. I believe it was meant for ES so I will forward to her.   Mary Whitt, APRN CNP

## 2018-12-12 NOTE — TELEPHONE ENCOUNTER
Reviewed chart.  Patient previously on:  Victoza 1.8 mg every day  Metformin 1000 mg PO BID  Glipizide 10 mg HS  Tresiba 16 Units every day    She is currently on:  Glipizide 10 mg BID  Metformin 1000 mg BID  Basaglar 46 Units daily.       We will have her discontinue the Basaglar and Start Victoza:  0.6 mg x 1 week  If needed increase to 1.2 mg x 1 week  And if needed increase back to 1.8 mg daily.     If still not controlled will add on Tresiba starting at 10 Units once morgan.       Roseline Whitten PA-C

## 2019-01-01 ENCOUNTER — MYC MEDICAL ADVICE (OUTPATIENT)
Dept: FAMILY MEDICINE | Facility: OTHER | Age: 54
End: 2019-01-01

## 2019-01-01 DIAGNOSIS — E11.65 TYPE 2 DIABETES MELLITUS WITH HYPERGLYCEMIA, WITHOUT LONG-TERM CURRENT USE OF INSULIN (H): ICD-10-CM

## 2019-01-02 ENCOUNTER — MEDICAL CORRESPONDENCE (OUTPATIENT)
Dept: HEALTH INFORMATION MANAGEMENT | Facility: CLINIC | Age: 54
End: 2019-01-02

## 2019-01-02 RX ORDER — LANCETS 26 GAUGE
EACH MISCELLANEOUS
Qty: 100 EACH | Refills: 11 | Status: SHIPPED | OUTPATIENT
Start: 2019-01-02

## 2019-01-02 NOTE — TELEPHONE ENCOUNTER
Spoke to patient, pended lancets and needles for refill. She states she bought enough OTC to last until we got it figured out. Routing to  to refill.    Maru Pimentel MA

## 2019-01-02 NOTE — TELEPHONE ENCOUNTER
All prescriptions requested were sent to the [facility] in [place] on 11/15/2018 for a 1 year supply so she would need to contact pharmacy for refills.     Will route to  to review message below before sending patient a MyChart response.

## 2019-01-03 NOTE — PROGRESS NOTES
SUBJECTIVE:   Yuki Boss is a 53 year old female who presents to clinic today for the following health issues:      Bladder infection, has had three more since she was told not take medication. Takes aso for a day and will go away but currently thinks she has one.  Not taking Azo daily, just when she has symptoms she'll take it for a day.     HPI  Diabetes Follow-up    Patient is checking blood sugars: once daily.  Results are as follows:         am - still high-See books    Diabetic concerns: blood sugar frequently over 200     Symptoms of hypoglycemia (low blood sugar): none     Paresthesias (numbness or burning in feet) or sores: No     Date of last diabetic eye exam: few months ago    Sugars were running upper 200's while on the 0.6 dose of Victoza.  Increased the dose to 1.2 recently and her BS dropped by 20-30 points around the 250 range.      She is set up to see Diabetes Educator through [facility] same as her .     Diabetes Management Resources    Hyperlipidemia Follow-Up      Rate your low fat/cholesterol diet?: good    Taking statin?  Yes, no muscle aches from statin    Other lipid medications/supplements?:  none    Hypertension Follow-up      Outpatient blood pressures are not being checked.    Low Salt Diet: 1500mg a day    BP Readings from Last 2 Encounters:   01/09/19 114/66   12/04/18 118/68     Hemoglobin A1C (%)   Date Value   01/09/2019 10.2 (H)   11/14/2018 10.8 (H)     LDL Cholesterol Calculated (mg/dL)   Date Value   08/29/2018 91   03/20/2018 73     Problem list and histories reviewed & adjusted, as indicated.  Additional history: as documented    Recent Labs   Lab Test 01/09/19  1038 11/14/18  1058 09/12/18  1029 08/29/18  0742 08/21/18  1524 06/27/18 03/20/18  0810 12/04/17  1003  03/14/17  1515  03/21/16   A1C 10.2* 10.8* 11.8*  --  11.9* 10.2 6.8* 7.3*   < > 8.9*   < > 8.2*   LDL  --   --   --  91  --   --  73  --   --  94  --  65   HDL  --   --   --  45*  --   --  46*  --    "--   --   --  51   TRIG  --   --   --  306*  --   --  216*  --   --   --   --  235*   ALT  --   --   --   --  28 28  --  27   < >  --   --   --    CR  --   --   --   --  0.54 0.75  --  0.63   < > 0.53  --  0.70   GFRESTIMATED  --   --   --   --  >90 >60  --  >90   < > >90  Non  GFR Calc    --  >60   GFRESTBLACK  --   --   --   --  >90 >60  --  >90   < > >90  African American GFR Calc    --  >60   POTASSIUM  --   --   --   --  4.1 4.1  --  4.3   < > 3.8  --  4.2   TSH  --   --   --  0.55 0.29* 0.29*  --   --    < >  --   --   --     < > = values in this interval not displayed.      BP Readings from Last 3 Encounters:   01/09/19 114/66   12/04/18 118/68   10/15/18 120/82    Wt Readings from Last 3 Encounters:   01/09/19 148.6 kg (327 lb 9.6 oz)   12/04/18 150 kg (330 lb 9.6 oz)   10/15/18 (!) 155.1 kg (342 lb)                    ROS:  Constitutional, HEENT, cardiovascular, pulmonary, gi and gu systems are negative, except as otherwise noted.    OBJECTIVE:     /66   Pulse 94   Temp 98.9  F (37.2  C) (Temporal)   Ht 1.647 m (5' 4.84\")   Wt 148.6 kg (327 lb 9.6 oz)   SpO2 97%   BMI 54.78 kg/m    Body mass index is 54.78 kg/m .  GENERAL: healthy, alert and no distress  RESP: lungs clear to auscultation - no rales, rhonchi or wheezes  CV: regular rate and rhythm, normal S1 S2, no S3 or S4, no murmur, click or rub, no peripheral edema and peripheral pulses strong  MS: no gross musculoskeletal defects noted, no edema  SKIN: no suspicious lesions or rashes  PSYCH: mentation appears normal, affect normal/bright    Diagnostic Test Results:  Results for orders placed or performed in visit on 01/09/19 (from the past 24 hour(s))   Hemoglobin A1c   Result Value Ref Range    Hemoglobin A1C 10.2 (H) 0 - 5.6 %   *UA reflex to Microscopic   Result Value Ref Range    Color Urine Yellow     Appearance Urine Clear     Glucose Urine >=1000 (A) NEG^Negative mg/dL    Bilirubin Urine Negative NEG^Negative    " Ketones Urine Negative NEG^Negative mg/dL    Specific Gravity Urine 1.010 1.003 - 1.035    Blood Urine Negative NEG^Negative    pH Urine 5.5 5.0 - 7.0 pH    Protein Albumin Urine Negative NEG^Negative mg/dL    Urobilinogen Urine 0.2 0.2 - 1.0 EU/dL    Nitrite Urine Negative NEG^Negative    Leukocyte Esterase Urine Negative NEG^Negative    Source Unspecified Urine        ASSESSMENT/PLAN:       ICD-10-CM    1. Type 2 diabetes mellitus with hyperglycemia, without long-term current use of insulin (H) E11.65 Hemoglobin A1c     insulin degludec (TRESIBA) 100 UNIT/ML pen     liraglutide (VICTOZA) 18 MG/3ML solution   2. Screen for colon cancer Z12.11 Fecal colorectal cancer screen FIT   3. Mixed hyperlipidemia E78.2    4. Essential hypertension with goal blood pressure less than 140/90 I10    5. UTI symptoms R39.9 *UA reflex to Microscopic       UA is negative for infection, advised against frequent AZO use, encouraged to stay hydrated.   Her DM has slightly improved in regards to A1c.  She is at least in the right direction.  Noticed approximately 20-30 point drop with the increase in Victoza.  Will have patient titrate to 1.8 max dose and monitor sugars. She will likely still be high therefore we will have her start Tresiba in 1 week at 10 units daily and can increase 2 units every 3-7 days until sugars are < 130.  Last time had to be on 30+ units of Tresiba.  Hoping she will not need as much as she has significantly changed her diet and her weight is coming down.     She should have enough medication for the next 3-6 months.   Follow-up planned for March, sooner if concerns, higher sugars, etc.      Options for treatment and follow-up care were reviewed with the patient and/or guardian. Patient and/or guardian engaged in the decision making process and verbalized understanding of the options discussed and agreed with the final plan.     Roseline Whitten PA-C  [facility]

## 2019-01-09 ENCOUNTER — OFFICE VISIT (OUTPATIENT)
Dept: FAMILY MEDICINE | Facility: OTHER | Age: 54
End: 2019-01-09
Payer: MEDICARE

## 2019-01-09 VITALS
HEART RATE: 94 BPM | SYSTOLIC BLOOD PRESSURE: 114 MMHG | WEIGHT: 293 LBS | OXYGEN SATURATION: 97 % | TEMPERATURE: 98.9 F | BODY MASS INDEX: 48.82 KG/M2 | DIASTOLIC BLOOD PRESSURE: 66 MMHG | HEIGHT: 65 IN

## 2019-01-09 DIAGNOSIS — Z12.11 SCREEN FOR COLON CANCER: ICD-10-CM

## 2019-01-09 DIAGNOSIS — E11.65 TYPE 2 DIABETES MELLITUS WITH HYPERGLYCEMIA, WITHOUT LONG-TERM CURRENT USE OF INSULIN (H): Primary | ICD-10-CM

## 2019-01-09 DIAGNOSIS — I10 ESSENTIAL HYPERTENSION WITH GOAL BLOOD PRESSURE LESS THAN 140/90: ICD-10-CM

## 2019-01-09 DIAGNOSIS — E78.2 MIXED HYPERLIPIDEMIA: ICD-10-CM

## 2019-01-09 DIAGNOSIS — R39.9 UTI SYMPTOMS: ICD-10-CM

## 2019-01-09 LAB
ALBUMIN UR-MCNC: NEGATIVE MG/DL
APPEARANCE UR: CLEAR
BILIRUB UR QL STRIP: NEGATIVE
COLOR UR AUTO: YELLOW
GLUCOSE UR STRIP-MCNC: >=1000 MG/DL
HBA1C MFR BLD: 10.2 % (ref 0–5.6)
HGB UR QL STRIP: NEGATIVE
KETONES UR STRIP-MCNC: NEGATIVE MG/DL
LEUKOCYTE ESTERASE UR QL STRIP: NEGATIVE
NITRATE UR QL: NEGATIVE
PH UR STRIP: 5.5 PH (ref 5–7)
SOURCE: ABNORMAL
SP GR UR STRIP: 1.01 (ref 1–1.03)
UROBILINOGEN UR STRIP-ACNC: 0.2 EU/DL (ref 0.2–1)

## 2019-01-09 PROCEDURE — 81003 URINALYSIS AUTO W/O SCOPE: CPT | Performed by: PHYSICIAN ASSISTANT

## 2019-01-09 PROCEDURE — 36415 COLL VENOUS BLD VENIPUNCTURE: CPT | Performed by: PHYSICIAN ASSISTANT

## 2019-01-09 PROCEDURE — 83036 HEMOGLOBIN GLYCOSYLATED A1C: CPT | Performed by: PHYSICIAN ASSISTANT

## 2019-01-09 PROCEDURE — 99214 OFFICE O/P EST MOD 30 MIN: CPT | Performed by: PHYSICIAN ASSISTANT

## 2019-01-09 RX ORDER — LISINOPRIL/HYDROCHLOROTHIAZIDE 10-12.5 MG
1 TABLET ORAL DAILY
Qty: 90 TABLET | Refills: 2 | Status: CANCELLED | OUTPATIENT
Start: 2019-01-09

## 2019-01-09 RX ORDER — LIRAGLUTIDE 6 MG/ML
1.8 INJECTION SUBCUTANEOUS DAILY
Qty: 27 ML | Refills: 1 | Status: SHIPPED | OUTPATIENT
Start: 2019-01-09 | End: 2019-07-22

## 2019-01-09 ASSESSMENT — PAIN SCALES - GENERAL: PAINLEVEL: NO PAIN (0)

## 2019-01-09 ASSESSMENT — MIFFLIN-ST. JEOR: SCORE: 2079.36

## 2019-01-09 NOTE — PATIENT INSTRUCTIONS
- Victoza 1.8 daily. For a week, if sugars are still 200's then start the Tresiba  - Tresiba 10 units daily. Titrate as instructed.   - Re-check March - - I'm back March 11th. Recheck sooner if sugars are going opposite direction or having issues with low blood sugars.

## 2019-02-11 DIAGNOSIS — E11.65 TYPE 2 DIABETES MELLITUS WITH HYPERGLYCEMIA, WITHOUT LONG-TERM CURRENT USE OF INSULIN (H): ICD-10-CM

## 2019-02-12 RX ORDER — GLIPIZIDE 10 MG/1
TABLET ORAL
Qty: 180 TABLET | Refills: 1 | Status: SHIPPED | OUTPATIENT
Start: 2019-02-12 | End: 2019-08-06

## 2019-03-05 ENCOUNTER — MYC MEDICAL ADVICE (OUTPATIENT)
Dept: FAMILY MEDICINE | Facility: OTHER | Age: 54
End: 2019-03-05

## 2019-03-12 DIAGNOSIS — E11.65 TYPE 2 DIABETES MELLITUS WITH HYPERGLYCEMIA, WITHOUT LONG-TERM CURRENT USE OF INSULIN (H): ICD-10-CM

## 2019-03-12 NOTE — TELEPHONE ENCOUNTER
Metformin  Prescription approved per [facility] Refill Protocol.    Next 5 appointments (look out 90 days)    Apr 12, 2019 11:20 AM CDT  Azul Simms with Roseline Whitten PA-C  [facility] ([facility]) [address] [place] [id number]  [phone]        Carmen Lambert, RN, BSN

## 2019-03-26 ENCOUNTER — OFFICE VISIT (OUTPATIENT)
Dept: FAMILY MEDICINE | Facility: OTHER | Age: 54
End: 2019-03-26
Payer: MEDICARE

## 2019-03-26 ENCOUNTER — APPOINTMENT (OUTPATIENT)
Dept: LAB | Facility: OTHER | Age: 54
End: 2019-03-26
Payer: MEDICARE

## 2019-03-26 VITALS
OXYGEN SATURATION: 95 % | TEMPERATURE: 97.7 F | SYSTOLIC BLOOD PRESSURE: 128 MMHG | RESPIRATION RATE: 16 BRPM | WEIGHT: 293 LBS | HEART RATE: 92 BPM | DIASTOLIC BLOOD PRESSURE: 78 MMHG | BODY MASS INDEX: 56.15 KG/M2

## 2019-03-26 DIAGNOSIS — E11.65 TYPE 2 DIABETES MELLITUS WITH HYPERGLYCEMIA, WITHOUT LONG-TERM CURRENT USE OF INSULIN (H): ICD-10-CM

## 2019-03-26 DIAGNOSIS — N30.01 ACUTE CYSTITIS WITH HEMATURIA: Primary | ICD-10-CM

## 2019-03-26 DIAGNOSIS — R39.9 UTI SYMPTOMS: ICD-10-CM

## 2019-03-26 LAB
BACTERIA #/AREA URNS HPF: ABNORMAL /HPF
RBC #/AREA URNS AUTO: ABNORMAL /HPF
WBC #/AREA URNS AUTO: ABNORMAL /HPF

## 2019-03-26 PROCEDURE — 81015 MICROSCOPIC EXAM OF URINE: CPT | Performed by: PHYSICIAN ASSISTANT

## 2019-03-26 PROCEDURE — 87086 URINE CULTURE/COLONY COUNT: CPT | Performed by: PHYSICIAN ASSISTANT

## 2019-03-26 PROCEDURE — 87186 SC STD MICRODIL/AGAR DIL: CPT | Performed by: PHYSICIAN ASSISTANT

## 2019-03-26 PROCEDURE — 99213 OFFICE O/P EST LOW 20 MIN: CPT | Performed by: PHYSICIAN ASSISTANT

## 2019-03-26 PROCEDURE — 87088 URINE BACTERIA CULTURE: CPT | Performed by: PHYSICIAN ASSISTANT

## 2019-03-26 RX ORDER — NITROFURANTOIN 25; 75 MG/1; MG/1
100 CAPSULE ORAL 2 TIMES DAILY
Qty: 10 CAPSULE | Refills: 0 | Status: SHIPPED | OUTPATIENT
Start: 2019-03-26 | End: 2019-07-29

## 2019-03-26 NOTE — PROGRESS NOTES
SUBJECTIVE:   Yuki Boss is a 54 year old female who presents to clinic today for the following health issues:    HPI  URINARY TRACT SYMPTOMS  Onset: Three days     Description:   Painful urination (Dysuria): YES  Blood in urine (Hematuria): no   Delay in urine (Hesitency): YES- but MS can cause that too    Intensity: 8/10    Progression of Symptoms:  worsening    Accompanying Signs & Symptoms:  Fever/chills: no   Flank pain: yes   Nausea and vomiting: no   Any vaginal symptoms: none  Abdominal/Pelvic Pain: YES    History:   History of frequent UTI's: YES- lately  History of kidney stones: no   Sexually Active: no   Possibility of pregnancy: No    Precipitating factors:   None    Therapies Tried and outcome: Azo    Has had recurrent symptoms on a regular basis but in past was not a UTI.    She is having increased discomfort and bloating as well.   Denies vaginal symptoms.       Problem list and histories reviewed & adjusted, as indicated.  Additional history: as documented    Current Outpatient Medications   Medication Sig Dispense Refill     acetaminophen 500 MG CAPS Take 500-1,000 mg by mouth every 8 hours as needed (for pain) 180 capsule 1     aspirin 81 MG tablet Take 1 tablet (81 mg) by mouth daily 90 tablet 3     blood glucose monitoring (CONTOUR NEXT MONITOR W/DEVICE KIT) meter device kit Use to test blood sugar 2 times daily or as directed. 1 kit 0     blood glucose monitoring (NO BRAND SPECIFIED) test strip Use to test blood sugar 2 times daily or as directed. 100 each 11     cetirizine (ZYRTEC) 10 MG tablet Take 1 tablet (10 mg) by mouth every evening 90 tablet 3     docusate sodium (COLACE) 100 MG tablet Take 100 mg by mouth daily 180 tablet 1     fluticasone (FLONASE) 50 MCG/ACT spray Spray 1-2 sprays into both nostrils daily 3 Bottle 1     glipiZIDE (GLUCOTROL) 10 MG tablet TAKE 1 TABLET BY MOUTH TWICE DAILY BEFORE MEAL(S) 180 tablet 1     insulin degludec (TRESIBA) 100 UNIT/ML pen Inject 10  Units Subcutaneous daily Increase by 2 units every 3 days for AM BS>130; max 30 units - Subcutaneous 15 mL 1     insulin pen needle (B-D U/F) 31G X 5 MM miscellaneous Use 1 daily or as directed. 100 each 11     liraglutide (VICTOZA) 18 MG/3ML solution Inject 1.8 mg Subcutaneous daily 27 mL 1     lisinopril-hydrochlorothiazide (PRINZIDE/ZESTORETIC) 10-12.5 MG per tablet Take 1 tablet by mouth daily 90 tablet 2     metFORMIN (GLUCOPHAGE) 1000 MG tablet TAKE 1 TABLET BY MOUTH TWICE DAILY WITH MEALS 180 tablet 0     nitroFURantoin macrocrystal-monohydrate (MACROBID) 100 MG capsule Take 1 capsule (100 mg) by mouth 2 times daily for 5 days 10 capsule 0     RELION LANCETS THIN 26G MISC 2 in 1 Lancet Devices   25 G needle / 1.8 mm 100 each 11     simvastatin (ZOCOR) 20 MG tablet Take 1 tablet (20 mg) by mouth At Bedtime 90 tablet 2     venlafaxine (EFFEXOR) 75 MG tablet TAKE 1 TABLET (75 MG) BY MOUTH 2 TIMES DAILY 180 tablet 1     Vitamin D, Cholecalciferol, 1000 units TABS Take 1,000 Units by mouth daily 180 tablet 3     NICOTINE POLACRILEX MT Take 4 mg by mouth Take 1/4 tablet several times a day         ROS:  Constitutional, HEENT, cardiovascular, pulmonary, gi and gu systems are negative, except as otherwise noted.    OBJECTIVE:     /78   Pulse 92   Temp 97.7  F (36.5  C) (Temporal)   Resp 16   Wt (!) 152.3 kg (335 lb 12.8 oz)   SpO2 95%   BMI 56.15 kg/m    Body mass index is 56.15 kg/m .  GENERAL: healthy, alert and no distress  RESP: lungs clear to auscultation - no rales, rhonchi or wheezes  CV: regular rate and rhythm, normal S1 S2, no S3 or S4, no murmur, click or rub, no peripheral edema and peripheral pulses strong  SKIN: no suspicious lesions or rashes  NEURO: Normal strength and tone, mentation intact and speech normal  PSYCH: mentation appears normal, affect normal/bright    Diagnostic Test Results:  Results for orders placed or performed in visit on 03/26/19 (from the past 24 hour(s))   Urine  Microscopic   Result Value Ref Range    WBC Urine 10-25 (A) OTO5^0 - 5 /HPF    RBC Urine 10-25 (A) OTO2^O - 2 /HPF    Bacteria Urine Many (A) NEG^Negative /HPF       ASSESSMENT/PLAN:       ICD-10-CM    1. Acute cystitis with hematuria N30.01 Urine Culture Aerobic Bacterial     nitroFURantoin macrocrystal-monohydrate (MACROBID) 100 MG capsule   2. UTI symptoms R39.9 Urine Microscopic     CANCELED: *UA reflex to Microscopic   3. Type 2 diabetes mellitus with hyperglycemia, without long-term current use of insulin (H) E11.65 insulin pen needle (B-D U/F) 31G X 5 MM miscellaneous       UA questionable for UTI, will culture to confirm and in meantime start her on Macrobid.  Encouraged her to stay hydrated.  Likely related to uncontrolled DM but she has been having some improvement in her sugar readings.  She has however had nausea from the Victoza.     Follow-up next week for DM re-check, at that time consider changes in medication, come in sooner if worse or new concerns.     Options for treatment and follow-up care were reviewed with the patient and/or guardian. Patient and/or guardian engaged in the decision making process and verbalized understanding of the options discussed and agreed with the final plan.     Roseline Whitten PA-C  [facility]

## 2019-03-27 LAB
BACTERIA SPEC CULT: ABNORMAL
Lab: ABNORMAL
SPECIMEN SOURCE: ABNORMAL

## 2019-04-05 ENCOUNTER — TELEPHONE (OUTPATIENT)
Dept: FAMILY MEDICINE | Facility: OTHER | Age: 54
End: 2019-04-05

## 2019-04-05 ENCOUNTER — OFFICE VISIT (OUTPATIENT)
Dept: FAMILY MEDICINE | Facility: OTHER | Age: 54
End: 2019-04-05
Payer: MEDICARE

## 2019-04-05 VITALS
RESPIRATION RATE: 14 BRPM | HEIGHT: 67 IN | WEIGHT: 293 LBS | SYSTOLIC BLOOD PRESSURE: 116 MMHG | HEART RATE: 94 BPM | DIASTOLIC BLOOD PRESSURE: 72 MMHG | OXYGEN SATURATION: 97 % | BODY MASS INDEX: 45.99 KG/M2 | TEMPERATURE: 97 F

## 2019-04-05 DIAGNOSIS — F33.1 MODERATE EPISODE OF RECURRENT MAJOR DEPRESSIVE DISORDER (H): Primary | ICD-10-CM

## 2019-04-05 DIAGNOSIS — E66.01 OBESITY, MORBID (H): ICD-10-CM

## 2019-04-05 DIAGNOSIS — E11.65 TYPE 2 DIABETES MELLITUS WITH HYPERGLYCEMIA, WITHOUT LONG-TERM CURRENT USE OF INSULIN (H): ICD-10-CM

## 2019-04-05 DIAGNOSIS — L81.9 HYPERPIGMENTED SKIN LESION: ICD-10-CM

## 2019-04-05 DIAGNOSIS — E11.29 TYPE II OR UNSPECIFIED TYPE DIABETES MELLITUS WITH RENAL MANIFESTATIONS, UNCONTROLLED(250.42) (H): Primary | ICD-10-CM

## 2019-04-05 DIAGNOSIS — E11.65 TYPE II OR UNSPECIFIED TYPE DIABETES MELLITUS WITH RENAL MANIFESTATIONS, UNCONTROLLED(250.42) (H): Primary | ICD-10-CM

## 2019-04-05 DIAGNOSIS — R39.9 UTI SYMPTOMS: ICD-10-CM

## 2019-04-05 LAB
ALBUMIN UR-MCNC: NEGATIVE MG/DL
APPEARANCE UR: CLEAR
BILIRUB UR QL STRIP: NEGATIVE
COLOR UR AUTO: YELLOW
DEPRECATED CALCIDIOL+CALCIFEROL SERPL-MC: 36 UG/L (ref 20–75)
GLUCOSE UR STRIP-MCNC: >=1000 MG/DL
HBA1C MFR BLD: 9.4 % (ref 0–5.6)
HGB UR QL STRIP: NEGATIVE
KETONES UR STRIP-MCNC: NEGATIVE MG/DL
LEUKOCYTE ESTERASE UR QL STRIP: NEGATIVE
NITRATE UR QL: NEGATIVE
PH UR STRIP: 5 PH (ref 5–7)
SOURCE: ABNORMAL
SP GR UR STRIP: 1.02 (ref 1–1.03)
UROBILINOGEN UR STRIP-ACNC: 0.2 EU/DL (ref 0.2–1)

## 2019-04-05 PROCEDURE — 99214 OFFICE O/P EST MOD 30 MIN: CPT | Performed by: PHYSICIAN ASSISTANT

## 2019-04-05 PROCEDURE — 82306 VITAMIN D 25 HYDROXY: CPT | Performed by: PHYSICIAN ASSISTANT

## 2019-04-05 PROCEDURE — 83036 HEMOGLOBIN GLYCOSYLATED A1C: CPT | Performed by: PHYSICIAN ASSISTANT

## 2019-04-05 PROCEDURE — 81003 URINALYSIS AUTO W/O SCOPE: CPT | Performed by: PHYSICIAN ASSISTANT

## 2019-04-05 PROCEDURE — 36415 COLL VENOUS BLD VENIPUNCTURE: CPT | Performed by: PHYSICIAN ASSISTANT

## 2019-04-05 RX ORDER — VENLAFAXINE HYDROCHLORIDE 150 MG/1
TABLET, EXTENDED RELEASE ORAL
Qty: 60 TABLET | Refills: 1 | Status: SHIPPED | OUTPATIENT
Start: 2019-04-05 | End: 2019-07-29

## 2019-04-05 RX ORDER — VENLAFAXINE HYDROCHLORIDE 75 MG/1
TABLET, EXTENDED RELEASE ORAL
Qty: 60 TABLET | Refills: 1 | Status: SHIPPED | OUTPATIENT
Start: 2019-04-05 | End: 2019-07-29

## 2019-04-05 ASSESSMENT — ANXIETY QUESTIONNAIRES
6. BECOMING EASILY ANNOYED OR IRRITABLE: NEARLY EVERY DAY
1. FEELING NERVOUS, ANXIOUS, OR ON EDGE: NEARLY EVERY DAY
GAD7 TOTAL SCORE: 16
2. NOT BEING ABLE TO STOP OR CONTROL WORRYING: NEARLY EVERY DAY
7. FEELING AFRAID AS IF SOMETHING AWFUL MIGHT HAPPEN: NOT AT ALL
IF YOU CHECKED OFF ANY PROBLEMS ON THIS QUESTIONNAIRE, HOW DIFFICULT HAVE THESE PROBLEMS MADE IT FOR YOU TO DO YOUR WORK, TAKE CARE OF THINGS AT HOME, OR GET ALONG WITH OTHER PEOPLE: VERY DIFFICULT
5. BEING SO RESTLESS THAT IT IS HARD TO SIT STILL: SEVERAL DAYS
3. WORRYING TOO MUCH ABOUT DIFFERENT THINGS: NEARLY EVERY DAY

## 2019-04-05 ASSESSMENT — PATIENT HEALTH QUESTIONNAIRE - PHQ9
SUM OF ALL RESPONSES TO PHQ QUESTIONS 1-9: 21
5. POOR APPETITE OR OVEREATING: NEARLY EVERY DAY

## 2019-04-05 ASSESSMENT — MIFFLIN-ST. JEOR: SCORE: 2153.84

## 2019-04-05 NOTE — TELEPHONE ENCOUNTER
Prior Authorization Retail Medication Request    Medication/Dose: venlafaxine   ICD code (if different than what is on RX):    Previously Tried and Failed:    Rationale:      Insurance Name:    Insurance ID:        Pharmacy Information (if different than what is on RX)  Name:    Phone:

## 2019-04-05 NOTE — PATIENT INSTRUCTIONS
Mood:  - Schedule with counseling  - Start taking 150 mg Effexor in the morning and then take 75 mg at bedtime.   - Let me know how it's going    Diabetes:  - We'll see what A1c shows  - Keep going with Tresiba  - Continue Victoza

## 2019-04-05 NOTE — PROGRESS NOTES
SUBJECTIVE:   Yuki Boss is a 54 year old female who presents to clinic today for the following health issues:      HPI  Diabetes Follow-up    Patient is checking blood sugars: once daily.  Results are as follows:         am - 179-214 when remembering to check    Diabetic concerns: blood sugar frequently over 200     Symptoms of hypoglycemia (low blood sugar): none     Paresthesias (numbness or burning in feet) or sores: Yes sometimes and noticing it starting.      Date of last diabetic eye exam: 10/25/2019    Tresiba titration is going well, increased up to 26 units once daily.     Continuing on Victoza and tolerating so far.     Weight has been increasing    Her mental health has been getting worse over the last couple of months.  More depression.  She does take Effexor 75 mg twice per day without side effects. Has a lot of stress with her own health as well as her husbands.  She has a lack of motivation.     Concerned she may or may not have a UTI.     Diabetes Management Resources    Hyperlipidemia Follow-Up      Rate your low fat/cholesterol diet?: good    Taking statin?  Yes, no muscle aches from statin    Other lipid medications/supplements?:  none    Hypertension Follow-up      Outpatient blood pressures are not being checked.    Low Salt Diet: no added salt    BP Readings from Last 2 Encounters:   04/05/19 116/72   03/26/19 128/78     Hemoglobin A1C (%)   Date Value   04/05/2019 9.4 (H)   01/09/2019 10.2 (H)     LDL Cholesterol Calculated (mg/dL)   Date Value   08/29/2018 91   03/20/2018 73     Depression and Anxiety Follow-Up    Status since last visit: Worsened     Other associated symptoms:None    Complicating factors:     Significant life event: No     Current substance abuse: None    See Above.    PHQ 10/4/2017 8/20/2018 4/5/2019   PHQ-9 Total Score 3 4 21   Q9: Thoughts of better off dead/self-harm past 2 weeks Not at all Not at all Not at all     TONY-7 SCORE 3/14/2017 8/20/2018 4/5/2019    Total Score - 4 (minimal anxiety) -   Total Score 7 4 16     PHQ-9  English  PHQ-9   Any Language  TONY-7  Suicide Assessment Five-step Evaluation and Treatment (SAFE-T)    Problem list and histories reviewed & adjusted, as indicated.  Additional history: as documented    Current Outpatient Medications   Medication Sig Dispense Refill     acetaminophen 500 MG CAPS Take 500-1,000 mg by mouth every 8 hours as needed (for pain) 180 capsule 1     aspirin 81 MG tablet Take 1 tablet (81 mg) by mouth daily 90 tablet 3     blood glucose monitoring (CONTOUR NEXT MONITOR W/DEVICE KIT) meter device kit Use to test blood sugar 2 times daily or as directed. 1 kit 0     blood glucose monitoring (NO BRAND SPECIFIED) test strip Use to test blood sugar 2 times daily or as directed. 100 each 11     cetirizine (ZYRTEC) 10 MG tablet Take 1 tablet (10 mg) by mouth every evening 90 tablet 3     docusate sodium (COLACE) 100 MG tablet Take 100 mg by mouth daily 180 tablet 1     fluticasone (FLONASE) 50 MCG/ACT spray Spray 1-2 sprays into both nostrils daily 3 Bottle 1     glipiZIDE (GLUCOTROL) 10 MG tablet TAKE 1 TABLET BY MOUTH TWICE DAILY BEFORE MEAL(S) 180 tablet 1     insulin degludec (TRESIBA) 100 UNIT/ML pen Inject 10 Units Subcutaneous daily Increase by 2 units every 3 days for AM BS>130; max 30 units - Subcutaneous 15 mL 1     insulin pen needle (B-D U/F) 31G X 5 MM miscellaneous Use 1 daily or as directed. 100 each 11     liraglutide (VICTOZA) 18 MG/3ML solution Inject 1.8 mg Subcutaneous daily 27 mL 1     lisinopril-hydrochlorothiazide (PRINZIDE/ZESTORETIC) 10-12.5 MG per tablet Take 1 tablet by mouth daily 90 tablet 2     metFORMIN (GLUCOPHAGE) 1000 MG tablet TAKE 1 TABLET BY MOUTH TWICE DAILY WITH MEALS 180 tablet 0     NICOTINE POLACRILEX MT Take 4 mg by mouth Take 1/4 tablet several times a day       RELION LANCETS THIN 26G MISC 2 in 1 Lancet Devices   25 G needle / 1.8 mm 100 each 11     simvastatin (ZOCOR) 20 MG tablet  "Take 1 tablet (20 mg) by mouth At Bedtime 90 tablet 2     venlafaxine (EFFEXOR-ER) 150 MG 24 hr tablet Take 1 Tablet in the morning. 60 tablet 1     venlafaxine (EFFEXOR-ER) 75 MG 24 hr tablet Take 1 tablet at bedtime (total dose daily is 225 mg) 60 tablet 1     Vitamin D, Cholecalciferol, 1000 units TABS Take 1,000 Units by mouth daily 180 tablet 3       ROS:  Constitutional, HEENT, cardiovascular, pulmonary, GI, , musculoskeletal, neuro, skin, endocrine and psych systems are negative, except as otherwise noted.    OBJECTIVE:     /72   Pulse 94   Temp 97  F (36.1  C) (Temporal)   Resp 14   Ht 1.69 m (5' 6.54\")   Wt (!) 153.9 kg (339 lb 3.2 oz)   SpO2 97%   BMI 53.87 kg/m    Body mass index is 53.87 kg/m .  GENERAL: healthy, alert and no distress  NECK: no adenopathy, no asymmetry, masses, or scars and thyroid normal to palpation  RESP: lungs clear to auscultation - no rales, rhonchi or wheezes  CV: regular rate and rhythm, normal S1 S2, no S3 or S4, no murmur, click or rub, no peripheral edema and peripheral pulses strong  MS: no gross musculoskeletal defects noted, no edema  SKIN: no suspicious lesions or rashes.  Right cheek very small barely visible darker macular lesion.  PSYCH: mentation appears normal, tearful, judgement and insight intact and appearance well groomed    Diagnostic Test Results:  Results for orders placed or performed in visit on 04/05/19 (from the past 24 hour(s))   Hemoglobin A1c   Result Value Ref Range    Hemoglobin A1C 9.4 (H) 0 - 5.6 %   *UA reflex to Microscopic   Result Value Ref Range    Color Urine Yellow     Appearance Urine Clear     Glucose Urine >=1000 (A) NEG^Negative mg/dL    Bilirubin Urine Negative NEG^Negative    Ketones Urine Negative NEG^Negative mg/dL    Specific Gravity Urine 1.020 1.003 - 1.035    Blood Urine Negative NEG^Negative    pH Urine 5.0 5.0 - 7.0 pH    Protein Albumin Urine Negative NEG^Negative mg/dL    Urobilinogen Urine 0.2 0.2 - 1.0 EU/dL    " Nitrite Urine Negative NEG^Negative    Leukocyte Esterase Urine Negative NEG^Negative    Source Unspecified Urine        ASSESSMENT/PLAN:       ICD-10-CM    1. Moderate episode of recurrent major depressive disorder (H) F33.1 venlafaxine (EFFEXOR-ER) 150 MG 24 hr tablet     venlafaxine (EFFEXOR-ER) 75 MG 24 hr tablet     Vitamin D Deficiency     MENTAL HEALTH REFERRAL  - Adult; Outpatient Treatment; Individual/Couples/Family/Group Therapy/Health Psychology; [facility]: [facility] [phone]; We will contact you to schedule the appointment or please call with any questions   2. Type 2 diabetes mellitus with hyperglycemia, without long-term current use of insulin (H) E11.65 Hemoglobin A1c   3. Obesity, morbid (H) E66.01 Vitamin D Deficiency   4. UTI symptoms R39.9 *UA reflex to Microscopic   5. Hyperpigmented skin lesion L81.9        1. Her mood is getting worse.  Discussed she has a lot of different stressors in life, her  is a very large stressor with his health concerns.  She is open to both therapy and increasing her Effexor.  We will increase AM dosing to 150 mg and continue with 75 mg at bedtime (thought she had weird dreams in past when she took 150 mg at bedtime). Re-check VItamin D.      2. Her DM is improving.  Still has high glucose in urine and no signs of a UTI but her A1c is now down to 9.4 which is great.  She is to continue with diabetes educator, diet changes and titrating her tresiba.       She noted a spot on right cheek that is barely visible, monitor, likely black head.   UA negative for infection.   She is wondering about physical, wondering about exam as she has had a lump on the inner labia majora for a while now that is unchanged but she is wanting to have it assessed, recommended OB/GYN for evaluation of this.     Follow-up in 1 month, sooner if needed.     Options for treatment and follow-up care were reviewed with the patient and/or guardian. Patient and/or guardian  engaged in the decision making process and verbalized understanding of the options discussed and agreed with the final plan.     Roseline Whitten PA-C  [facility]

## 2019-04-05 NOTE — TELEPHONE ENCOUNTER
LM for patient to return call regarding below:  --Notes recorded by Roseline Whitten PA-C on 4/5/2019 at 1:02 PM CDT--  Please call patient.  Urine negative for a UTI. Glucose still present.   Her A1c has improved which is great went from 10.2 to 9.4, keep it up.   Help her schedule with OB/GYN  Roseline Pimentel MA

## 2019-04-06 ASSESSMENT — ANXIETY QUESTIONNAIRES: GAD7 TOTAL SCORE: 16

## 2019-04-11 NOTE — TELEPHONE ENCOUNTER
There are two different dosages for venlafaxine in the patients chart. Which requires a prior authorization?

## 2019-04-12 ENCOUNTER — TELEPHONE (OUTPATIENT)
Dept: FAMILY MEDICINE | Facility: OTHER | Age: 54
End: 2019-04-12

## 2019-04-12 DIAGNOSIS — F33.1 MODERATE EPISODE OF RECURRENT MAJOR DEPRESSIVE DISORDER (H): Primary | ICD-10-CM

## 2019-04-12 RX ORDER — VENLAFAXINE HYDROCHLORIDE 75 MG/1
75 CAPSULE, EXTENDED RELEASE ORAL AT BEDTIME
Qty: 60 CAPSULE | Refills: 1 | Status: SHIPPED | OUTPATIENT
Start: 2019-04-12 | End: 2019-08-06

## 2019-04-12 RX ORDER — VENLAFAXINE HYDROCHLORIDE 150 MG/1
150 CAPSULE, EXTENDED RELEASE ORAL EVERY MORNING
Qty: 60 CAPSULE | Refills: 1 | Status: SHIPPED | OUTPATIENT
Start: 2019-04-12 | End: 2019-08-06

## 2019-04-12 NOTE — TELEPHONE ENCOUNTER
Copied from 04/05/19 telephone encounter:     Prior Authorization Retail Medication Request     Medication/Dose: venlafaxine 75mg  ICD code (if different than what is on RX):    Previously Tried and Failed:    Rationale:       Insurance Name:    Insurance ID:          Pharmacy Information (if different than what is on RX)  Name:    Phone:

## 2019-04-12 NOTE — TELEPHONE ENCOUNTER
Prior Authorization Not Needed per Insurance    Medication: venlafaxine 150mg - INITIATED  Insurance Company: [employer] - Phone [phone] Fax [phone]  Expected CoPay:      Pharmacy Filling the Rx: [facility] [address] [place] [address] [address] [facility] [place]  Pharmacy Notified: Yes    Called [facility] at [phone] to make sure the capsules are going through for patient as clinic sent them a new script this morning, pharmacist states that they are now going through and no PA is needed, they will call patient and notify her once it is ready.

## 2019-04-12 NOTE — TELEPHONE ENCOUNTER
PA Initiation    Medication: venlafaxine 150mg  Insurance Company: [employer] - Phone [phone] Fax [phone]  Pharmacy Filling the Rx: [facility] [address] [place] [address] [address] [place] [address]  Filling Pharmacy Phone: [phone]  Filling Pharmacy Fax:    Start Date: 4/11/2019    [facility] Prior Authorization Team   Phone: [phone]      Awaiting additional questions via CMM

## 2019-04-12 NOTE — TELEPHONE ENCOUNTER
Reason for call:  [facility] pharmacy is calling because the were seeing if maybe instead of doing th PA if they could  send over a prescription for a capsule for the venlafaxine. Typically the capsules are cheaper than the tablets and insurance seems to cover capsules.

## 2019-04-12 NOTE — TELEPHONE ENCOUNTER
Will route to ES to review and place a new script if appropriate.  Eliz Cantu CMA ([facility])

## 2019-04-12 NOTE — TELEPHONE ENCOUNTER
PA Initiation    Medication: venlafaxine (EFFEXOR-ER) 75 MG 24 hr tablet  Insurance Company: [employer] - Phone [phone] Fax [phone]  Pharmacy Filling the Rx: [address] [place] [address]  Filling Pharmacy Phone: [phone]  Filling Pharmacy Fax:    Start Date: 4/12/2019    [facility] Prior Authorization Team   Phone: [phone]        Awaiting additional questions via CMM

## 2019-04-12 NOTE — TELEPHONE ENCOUNTER
Prior Authorization Not Needed per Insurance    Medication: venlafaxine (EFFEXOR-ER) 75 MG 24 hr tablet  Insurance Company: [employer] - Phone [phone] Fax [phone]  Pharmacy Filling the Rx: [facility] [address] [place] [address] [address] [facility]  Pharmacy Notified: Yes    Called [facility] at [phone] to make sure the capsules are going through for patient as clinic sent them a new script this morning, pharmacist states that they are now going through and no PA is needed, they will call patient and notify her once it is ready.

## 2019-04-24 ENCOUNTER — TELEPHONE (OUTPATIENT)
Dept: FAMILY MEDICINE | Facility: OTHER | Age: 54
End: 2019-04-24

## 2019-04-24 DIAGNOSIS — E11.65 TYPE 2 DIABETES MELLITUS WITH HYPERGLYCEMIA, WITHOUT LONG-TERM CURRENT USE OF INSULIN (H): ICD-10-CM

## 2019-04-25 RX ORDER — INSULIN DEGLUDEC 100 U/ML
INJECTION, SOLUTION SUBCUTANEOUS
Qty: 15 ML | Refills: 0 | Status: SHIPPED | OUTPATIENT
Start: 2019-04-25 | End: 2019-04-30

## 2019-04-25 NOTE — TELEPHONE ENCOUNTER
Tresiba  Prescription approved per [facility] Refill Protocol.    Next 5 appointments (look out 90 days)    May 23, 2019  1:00 PM CDT  Office Visit with Molly Cleaning MD  [facility] [facility] [id number]  [phone]        Carmen Lambert RN, BSN

## 2019-04-29 NOTE — TELEPHONE ENCOUNTER
She has been using 50 units of Tresiba daily? Last note on 04/05/2019 notes patient was taking 26 units daily. How often and how much is she titrating her dose?    Roseline Whitten PA-C

## 2019-04-29 NOTE — TELEPHONE ENCOUNTER
Message from pharmacy regarding the Tresiba Flextouch--Please send new Rx with updated Max # units/day- Pt has been using 50 units per day. Then we can bill insurance since patient is out of insulin.  Pharmacy pended.

## 2019-04-30 NOTE — TELEPHONE ENCOUNTER
Currently she is at 50 and she is still running 190's she states she has gone up 2 per week. She states on March 24th-31st she was at 40. She said it has been a while since she was at 26.   She is completely out of medication at this point.   Luly Vasquez MA

## 2019-05-22 ENCOUNTER — MYC MEDICAL ADVICE (OUTPATIENT)
Dept: FAMILY MEDICINE | Facility: OTHER | Age: 54
End: 2019-05-22

## 2019-05-22 NOTE — PROGRESS NOTES
Subjective     Yuki Boss is a 54 year old female who presents to clinic today for the following health issues:    HPI   Acute Illness   Acute illness concerns: cold, but has been getting better  Onset: may 12th    Fever: no    Chills/Sweats: no    Headache (location?): YES    Sinus Pressure:YES    Conjunctivitis:  no    Ear Pain: no    Rhinorrhea: YES    Congestion: YES    Sore Throat: YES     Cough: YES-productive of yellow sputum, productive of green sputum    Wheeze: no     Decreased Appetite: no     Nausea: no    Vomiting: no    Diarrhea:  no    Dysuria/Freq.: no    Fatigue/Achiness: YES    Sick/Strep Exposure: no     Therapies Tried and outcome: vicks, chlorseptic, flonase    - Seems to be improving, still has drainage.   - Less coughing, now can sleep through the night.       MOOD:  - Since increasing the dose of Effexor she has noticed significant improvement pretty much immediately.    - Initially had some sleeping difficulties but nothing that is lasting.   - Feeling much better.       DIABETES:  - Blood sugars still running 200's AM Fasting, occasionally has 190's.    - using Tresiba 60 units daily.   - Still taking Metformin and Victoza as well.       Current Outpatient Medications   Medication Sig Dispense Refill     acetaminophen 500 MG CAPS Take 500-1,000 mg by mouth every 8 hours as needed (for pain) 180 capsule 1     aspirin 81 MG tablet Take 1 tablet (81 mg) by mouth daily 90 tablet 3     blood glucose monitoring (CONTOUR NEXT MONITOR W/DEVICE KIT) meter device kit Use to test blood sugar 2 times daily or as directed. 1 kit 0     blood glucose monitoring (NO BRAND SPECIFIED) test strip Use to test blood sugar 2 times daily or as directed. 100 each 11     cetirizine (ZYRTEC) 10 MG tablet Take 1 tablet (10 mg) by mouth every evening 90 tablet 3     docusate sodium (COLACE) 100 MG tablet Take 100 mg by mouth daily 180 tablet 1     fluticasone (FLONASE) 50 MCG/ACT spray Spray 1-2 sprays into both  nostrils daily 3 Bottle 1     glipiZIDE (GLUCOTROL) 10 MG tablet TAKE 1 TABLET BY MOUTH TWICE DAILY BEFORE MEAL(S) 180 tablet 1     insulin degludec (TRESIBA FLEXTOUCH) 100 UNIT/ML pen Inject 50 units subcutaneously once daily.  Increase by 2 units every 3 days for AM blood sugar greater than 130.  Max 60 Units 18 mL 3     insulin pen needle (B-D U/F) 31G X 5 MM miscellaneous Use 1 daily or as directed. 100 each 11     liraglutide (VICTOZA) 18 MG/3ML solution Inject 1.8 mg Subcutaneous daily 27 mL 1     lisinopril-hydrochlorothiazide (PRINZIDE/ZESTORETIC) 10-12.5 MG per tablet Take 1 tablet by mouth daily 90 tablet 2     metFORMIN (GLUCOPHAGE) 1000 MG tablet TAKE 1 TABLET BY MOUTH TWICE DAILY WITH MEALS 180 tablet 0     NICOTINE POLACRILEX MT Take 4 mg by mouth Take 1/4 tablet several times a day       RELION LANCETS THIN 26G MISC 2 in 1 [employer] Devices   25 G needle / 1.8 mm 100 each 11     simvastatin (ZOCOR) 20 MG tablet Take 1 tablet (20 mg) by mouth At Bedtime 90 tablet 2     venlafaxine (EFFEXOR-XR) 150 MG 24 hr capsule Take 1 capsule (150 mg) by mouth every morning Total Effexor dose per day 225 mg 60 capsule 1     venlafaxine (EFFEXOR-XR) 75 MG 24 hr capsule Take 1 capsule (75 mg) by mouth At Bedtime Total Effexor dose per day 225 mg 60 capsule 1     Vitamin D, Cholecalciferol, 1000 units TABS Take 1,000 Units by mouth daily 180 tablet 3     venlafaxine (EFFEXOR-ER) 150 MG 24 hr tablet Take 1 Tablet in the morning. (Patient not taking: Reported on 5/24/2019) 60 tablet 1     venlafaxine (EFFEXOR-ER) 75 MG 24 hr tablet Take 1 tablet at bedtime (total dose daily is 225 mg) (Patient not taking: Reported on 5/24/2019) 60 tablet 1     Allergies   Allergen Reactions     Codeine      Copaxone [Glatiramer] Other (See Comments)     Injection site reaction     Morphine        Reviewed and updated as needed this visit by Provider  Tobacco  Allergies  Meds  Problems  Med Hx  Surg Hx  Fam Hx         Review of  "Systems   ROS COMP: Constitutional, HEENT, cardiovascular, pulmonary, GI, , musculoskeletal, neuro, skin, endocrine and psych systems are negative, except as otherwise noted.      Objective    /74   Pulse 100   Temp 97.5  F (36.4  C) (Temporal)   Resp 14   Wt (!) 154.1 kg (339 lb 12.8 oz)   SpO2 97%   BMI 53.97 kg/m    Body mass index is 53.97 kg/m .  Physical Exam   GENERAL: healthy, alert and no distress  EYES: Eyes grossly normal to inspection, PERRL and conjunctivae and sclerae normal  HENT: normal cephalic/atraumatic, ear canals and TM's normal, nasal mucosa edematous , rhinorrhea clear, oropharynx clear, oral mucous membranes moist and sinuses: not tender  NECK: no adenopathy, no asymmetry, masses, or scars and thyroid normal to palpation  RESP: lungs clear to auscultation - no rales, rhonchi or wheezes  CV: regular rate and rhythm, normal S1 S2, no S3 or S4, no murmur, click or rub, no peripheral edema and peripheral pulses strong  MS: no gross musculoskeletal defects noted, no edema  PSYCH: mentation appears normal, affect normal/bright    Diagnostic Test Results:  Labs reviewed in Epic        Assessment & Plan       ICD-10-CM    1. Viral URI with cough J06.9     B97.89    2. Type 2 diabetes mellitus with hyperglycemia, without long-term current use of insulin (H) E11.65    3. Moderate episode of recurrent major depressive disorder (H) F33.1         BMI:   Estimated body mass index is 53.97 kg/m  as calculated from the following:    Height as of 4/5/19: 1.69 m (5' 6.54\").    Weight as of this encounter: 154.1 kg (339 lb 12.8 oz).   Weight management plan: Discussed healthy diet and exercise guidelines        1. Her symptoms of recent illness are improving, likely viral.  Has a lot of post nasal drainage and congestion.  Recommended using Flonase twice per day until resolved.  Can also take Mucinex.  If not improving or developing new or worsening symptoms recommended follow-up.     2. BS have " improved but still too high.  Recommended increasing Tresiba 2 units and update with AM Fasting, Pre and Post Meal and Evening Blood sugar after the weekend.  Continue other medications.  May need to add in meal time insulin. Follow-up on Blood sugars next week via MyChart    3. Mood has improved significantly.  Will continue with current doses.     Options for treatment and follow-up care were reviewed with the patient and/or guardian. Patient and/or guardian engaged in the decision making process and verbalized understanding of the options discussed and agreed with the final plan.    Return in about 5 days (around 5/29/2019) for If not improving, sooner if worse or new concerns, Recheck.    Roseline Whitten PA-C  [facility]    Answers for HPI/ROS submitted by the patient on 5/24/2019   If you checked off any problems, how difficult have these problems made it for you to do your work, take care of things at home, or get along with other people?: Not difficult at all  PHQ9 TOTAL SCORE: 1  TONY 7 TOTAL SCORE: 0

## 2019-05-24 ENCOUNTER — OFFICE VISIT (OUTPATIENT)
Dept: FAMILY MEDICINE | Facility: OTHER | Age: 54
End: 2019-05-24
Payer: MEDICARE

## 2019-05-24 VITALS
RESPIRATION RATE: 14 BRPM | HEART RATE: 100 BPM | DIASTOLIC BLOOD PRESSURE: 74 MMHG | WEIGHT: 293 LBS | BODY MASS INDEX: 53.97 KG/M2 | SYSTOLIC BLOOD PRESSURE: 124 MMHG | TEMPERATURE: 97.5 F | OXYGEN SATURATION: 97 %

## 2019-05-24 DIAGNOSIS — E11.65 TYPE 2 DIABETES MELLITUS WITH HYPERGLYCEMIA, WITHOUT LONG-TERM CURRENT USE OF INSULIN (H): ICD-10-CM

## 2019-05-24 DIAGNOSIS — F33.1 MODERATE EPISODE OF RECURRENT MAJOR DEPRESSIVE DISORDER (H): ICD-10-CM

## 2019-05-24 DIAGNOSIS — J06.9 VIRAL URI WITH COUGH: Primary | ICD-10-CM

## 2019-05-24 PROCEDURE — 99214 OFFICE O/P EST MOD 30 MIN: CPT | Performed by: PHYSICIAN ASSISTANT

## 2019-05-24 ASSESSMENT — ANXIETY QUESTIONNAIRES
2. NOT BEING ABLE TO STOP OR CONTROL WORRYING: NOT AT ALL
5. BEING SO RESTLESS THAT IT IS HARD TO SIT STILL: NOT AT ALL
GAD7 TOTAL SCORE: 0
4. TROUBLE RELAXING: NOT AT ALL
7. FEELING AFRAID AS IF SOMETHING AWFUL MIGHT HAPPEN: NOT AT ALL
7. FEELING AFRAID AS IF SOMETHING AWFUL MIGHT HAPPEN: NOT AT ALL
GAD7 TOTAL SCORE: 0
GAD7 TOTAL SCORE: 0
6. BECOMING EASILY ANNOYED OR IRRITABLE: NOT AT ALL
3. WORRYING TOO MUCH ABOUT DIFFERENT THINGS: NOT AT ALL
1. FEELING NERVOUS, ANXIOUS, OR ON EDGE: NOT AT ALL

## 2019-05-24 ASSESSMENT — PATIENT HEALTH QUESTIONNAIRE - PHQ9
SUM OF ALL RESPONSES TO PHQ QUESTIONS 1-9: 1
SUM OF ALL RESPONSES TO PHQ QUESTIONS 1-9: 1
10. IF YOU CHECKED OFF ANY PROBLEMS, HOW DIFFICULT HAVE THESE PROBLEMS MADE IT FOR YOU TO DO YOUR WORK, TAKE CARE OF THINGS AT HOME, OR GET ALONG WITH OTHER PEOPLE: NOT DIFFICULT AT ALL

## 2019-05-25 ASSESSMENT — PATIENT HEALTH QUESTIONNAIRE - PHQ9: SUM OF ALL RESPONSES TO PHQ QUESTIONS 1-9: 1

## 2019-05-25 ASSESSMENT — ANXIETY QUESTIONNAIRES: GAD7 TOTAL SCORE: 0

## 2019-05-31 DIAGNOSIS — M43.16 SPONDYLOLISTHESIS OF LUMBAR REGION: ICD-10-CM

## 2019-05-31 DIAGNOSIS — N95.9 POST MENOPAUSAL PROBLEMS: ICD-10-CM

## 2019-05-31 DIAGNOSIS — E11.65 TYPE 2 DIABETES MELLITUS WITH HYPERGLYCEMIA, WITHOUT LONG-TERM CURRENT USE OF INSULIN (H): ICD-10-CM

## 2019-05-31 DIAGNOSIS — F33.1 MODERATE EPISODE OF RECURRENT MAJOR DEPRESSIVE DISORDER (H): ICD-10-CM

## 2019-05-31 DIAGNOSIS — E66.01 OBESITY, MORBID (H): ICD-10-CM

## 2019-05-31 DIAGNOSIS — G35 MS (MULTIPLE SCLEROSIS) (H): Primary | ICD-10-CM

## 2019-05-31 PROCEDURE — 82274 ASSAY TEST FOR BLOOD FECAL: CPT | Performed by: PHYSICIAN ASSISTANT

## 2019-06-04 DIAGNOSIS — E11.65 TYPE 2 DIABETES MELLITUS WITH HYPERGLYCEMIA, WITHOUT LONG-TERM CURRENT USE OF INSULIN (H): ICD-10-CM

## 2019-06-05 NOTE — TELEPHONE ENCOUNTER
Pending Prescriptions:                       Disp   Refills    metFORMIN (GLUCOPHAGE) 1000 MG tablet [Ph*180 ta*0            Sig: TAKE 1 TABLET BY MOUTH TWICE DAILY WITH MEALS    Prescription approved per [facility] Refill Protocol.    Ramila George, RN, BSN

## 2019-06-06 DIAGNOSIS — Z12.11 SCREEN FOR COLON CANCER: ICD-10-CM

## 2019-06-06 LAB — HEMOCCULT STL QL IA: NEGATIVE

## 2019-06-24 ENCOUNTER — TELEPHONE (OUTPATIENT)
Dept: FAMILY MEDICINE | Facility: OTHER | Age: 54
End: 2019-06-24

## 2019-06-24 NOTE — TELEPHONE ENCOUNTER
Reason for Call:  Form, our goal is to have forms completed with 72 hours, however, some forms may require a visit or additional information.    Type of letter, form or note:  Apt complex\    Who is the form from?: apt  (if other please explain)    Where did the form come from: form was mailed in    What clinic location was the form placed at?: [facility] - [phone]    Where the form was placed: box Box/Folder    What number is listed as a contact on the form?: [phone]       Additional comments: send back in envelope provided     Call taken on 6/24/2019 at 1:28 PM by [place]

## 2019-06-25 ENCOUNTER — MYC MEDICAL ADVICE (OUTPATIENT)
Dept: FAMILY MEDICINE | Facility: OTHER | Age: 54
End: 2019-06-25

## 2019-06-25 DIAGNOSIS — E11.65 TYPE 2 DIABETES MELLITUS WITH HYPERGLYCEMIA, WITHOUT LONG-TERM CURRENT USE OF INSULIN (H): ICD-10-CM

## 2019-06-25 NOTE — TELEPHONE ENCOUNTER
Will route to ES to review patient's mychart request on going up on her Tesiba to 68 units.  Readings from BS are attached.  Eliz Cantu CMA ([facility])

## 2019-06-25 NOTE — TELEPHONE ENCOUNTER
ES, [facility] calling to clarify. They need a max units per day in order to bill insurance.  Maru Pimentel MA

## 2019-07-03 DIAGNOSIS — E78.2 MIXED HYPERLIPIDEMIA: ICD-10-CM

## 2019-07-05 RX ORDER — SIMVASTATIN 20 MG
TABLET ORAL
Qty: 90 TABLET | Refills: 0 | Status: SHIPPED | OUTPATIENT
Start: 2019-07-05 | End: 2019-10-01

## 2019-07-05 NOTE — TELEPHONE ENCOUNTER
Pending Prescriptions:                       Disp   Refills    simvastatin (ZOCOR) 20 MG tablet [Pharmac*90 tab*2            Sig: TAKE 1 TABLET BY MOUTH AT BEDTIME    Medication is being filled for 1 time refill only due to:  Patient needs to be seen because OV with fasting labs.     Next 5 appointments (look out 90 days)    Jul 31, 2019 12:00 PM CDT  Return Visit with Angelina Jeff, [facility] ([facility]) [address] [place] [id number] [phone]        Please help schedule OV with fasting labs in Aug.    Ramila George, RN, BSN

## 2019-07-09 DIAGNOSIS — E11.65 TYPE 2 DIABETES MELLITUS WITH HYPERGLYCEMIA, WITHOUT LONG-TERM CURRENT USE OF INSULIN (H): ICD-10-CM

## 2019-07-09 RX ORDER — LISINOPRIL/HYDROCHLOROTHIAZIDE 10-12.5 MG
TABLET ORAL
Qty: 90 TABLET | Refills: 0 | Status: SHIPPED | OUTPATIENT
Start: 2019-07-09 | End: 2019-10-01

## 2019-07-09 NOTE — TELEPHONE ENCOUNTER
Prinzide  Prescription approved per [facility] Refill Protocol.    Carmen Lambert, RN, BSN

## 2019-07-19 NOTE — PROGRESS NOTES
"SUBJECTIVE:   Yuki Boss is a 54 year old female who presents for Preventive Visit.  Are you in the first 12 months of your Medicare coverage?  No    Healthy Habits:     In general, how would you rate your overall health?  Fair    Frequency of exercise:  None    Do you usually eat at least 4 servings of fruit and vegetables a day, include whole grains    & fiber and avoid regularly eating high fat or \"junk\" foods?  No    Taking medications regularly:  Yes    Medication side effects:  None    Ability to successfully perform activities of daily living:  Shopping requires assistance, housework requires assistance and laundry requires assistance    Home Safety:  No safety concerns identified    Hearing Impairment:  Difficulty following a conversation in a noisy restaurant or crowded room, need to ask people to speak up or repeat themselves, difficulty understanding soft or whispered speech and difficulty understanding speech on the telephone    In the past 6 months, have you been bothered by leaking of urine? Yes    In general, how would you rate your overall mental or emotional health?  Fair      PHQ-2 Total Score: 2    Additional concerns today:  No    Do you feel safe in your environment? Yes    Do you have a Health Care Directive? No: Advance care planning reviewed with patient; information given to patient to review.     Fall risk  Fallen 2 or more times in the past year?: (P) No  Any fall with injury in the past year?: (P) No  click delete button to remove this line now    Cognitive Screening   1) Repeat 3 items (Leader, Season, Table)    2) Clock draw: NORMAL  3) 3 item recall: Recalls 2 objects   Results: NORMAL clock, 1-2 items recalled: COGNITIVE IMPAIRMENT LESS LIKELY    Mini-CogTM Copyright ALEXIS Davidson. Licensed by the author for use in [facility]; reprinted with permission (casper@.[facility]). All rights reserved.      DM:  - Fasting AM sugars < 200, closer to 120-140 range.   - Had one episode " of hypoglycemia for patient 90 BS, jamie, ate and it resolved.   - Taking daytime naps.    - They are starting aquatic workouts tomorrow    Mood:  - Doing well.   - Tolerating medication without side effects  - Seeing counselor, needs to switch as counselor is moving locations.     Reviewed and updated as needed this visit by clinical staff  Tobacco  Allergies  Meds  Problems  Med Hx  Surg Hx  Fam Hx  Soc Hx          Reviewed and updated as needed this visit by Provider  Tobacco  Allergies  Meds  Problems  Med Hx  Surg Hx  Fam Hx        Social History     Tobacco Use     Smoking status: Former Smoker     Packs/day: 1.00     Years: 10.00     Pack years: 10.00     Last attempt to quit: 2018     Years since quittin.4     Smokeless tobacco: Never Used   Substance Use Topics     Alcohol use: No     If you drink alcohol do you typically have >3 drinks per day or >7 drinks per week? No    Alcohol Use 2019   Prescreen: >3 drinks/day or >7 drinks/week? Not Applicable   No flowsheet data found.        Current providers sharing in care for this patient include:   Patient Care Team:  Roseline Whitten PA-C as PCP - General (Physician Assistant)  Roseline Whitten PA-C as Assigned PCP    The following health maintenance items are reviewed in Epic and correct as of today:  Health Maintenance   Topic Date Due     HIV SCREENING  1978     MEDICARE ANNUAL WELLNESS VISIT  2010     ZOSTER IMMUNIZATION (1 of 2) 2013     DTAP/TDAP/TD IMMUNIZATION (2 - Td) 2019     BMP  2019     LIPID  2019     INFLUENZA VACCINE (1) 2019     MICROALBUMIN  2019     A1C  10/05/2019     EYE EXAM  10/25/2019     DIABETIC FOOT EXAM  2019     PHQ-9  2020     FIT  2020     TSH W/FREE T4 REFLEX  2020     MAMMO SCREENING  2020     ADVANCE CARE PLANNING  2023     HEPATITIS C SCREENING  Completed     DEPRESSION ACTION PLAN  Completed     IPV IMMUNIZATION  Aged  Out     MENINGITIS IMMUNIZATION  Aged Out     Lab work is in process  BP Readings from Last 3 Encounters:   19 126/74   19 124/74   19 116/72    Wt Readings from Last 3 Encounters:   19 (!) 159.5 kg (351 lb 9.6 oz)   19 (!) 154.1 kg (339 lb 12.8 oz)   19 (!) 153.9 kg (339 lb 3.2 oz)                  Patient Active Problem List   Diagnosis     Allergic rhinitis     Obesity, morbid (H)     Type 2 diabetes mellitus with hyperglycemia, without long-term current use of insulin (H)     Essential hypertension with goal blood pressure less than 140/90     Mixed hyperlipidemia     Spondylolisthesis of lumbar region     Left lumbar radiculitis     Moderate episode of recurrent major depressive disorder (H)     Physical deconditioning     Advanced care planning/counseling discussion     Astigmatism     Hypermetropia     Low TSH level     Low serum T4 level     MS (multiple sclerosis) (H)     Urinary incontinence, unspecified type     Past Surgical History:   Procedure Laterality Date     D & C  10/5/1990     HYSTERECTOMY, PAP NO LONGER INDICATED      bilateral ovaries remain     TONSILLECTOMY         Social History     Tobacco Use     Smoking status: Former Smoker     Packs/day: 1.00     Years: 10.00     Pack years: 10.00     Last attempt to quit: 2018     Years since quittin.4     Smokeless tobacco: Never Used   Substance Use Topics     Alcohol use: No     Family History   Problem Relation Age of Onset     Thyroid Disease Mother         Hashimoto     Cancer Father         lung, smoker     Cancer Maternal Grandmother         pancreatic     Cancer Paternal Grandfather         lymphoma     Breast Cancer Maternal Aunt      Breast Cancer Paternal Grandmother          Current Outpatient Medications   Medication Sig Dispense Refill     acetaminophen 500 MG CAPS Take 500-1,000 mg by mouth every 8 hours as needed (for pain) 180 capsule 1     aspirin 81 MG tablet Take 1 tablet (81 mg) by  mouth daily 90 tablet 3     blood glucose monitoring (CONTOUR NEXT MONITOR W/DEVICE KIT) meter device kit Use to test blood sugar 2 times daily or as directed. 1 kit 0     blood glucose monitoring (NO BRAND SPECIFIED) test strip Use to test blood sugar 2 times daily or as directed. 100 each 11     cetirizine (ZYRTEC) 10 MG tablet Take 1 tablet (10 mg) by mouth every evening 90 tablet 3     docusate sodium (COLACE) 100 MG tablet Take 100 mg by mouth daily 180 tablet 1     fluticasone (FLONASE) 50 MCG/ACT spray Spray 1-2 sprays into both nostrils daily 3 Bottle 1     glipiZIDE (GLUCOTROL) 10 MG tablet TAKE 1 TABLET BY MOUTH TWICE DAILY BEFORE MEAL(S) 180 tablet 1     insulin degludec (TRESIBA FLEXTOUCH) 100 UNIT/ML pen Inject 66 units subcutaneously once daily.  Increase by 2 units every 3 days for AM blood sugar greater than 130.  Max dose 80 Units 18 mL 3     insulin pen needle (B-D U/F) 31G X 5 MM miscellaneous Use 1 daily or as directed. 100 each 11     lisinopril-hydrochlorothiazide (PRINZIDE/ZESTORETIC) 10-12.5 MG tablet TAKE 1 TABLET BY MOUTH ONCE DAILY 90 tablet 0     metFORMIN (GLUCOPHAGE) 1000 MG tablet TAKE 1 TABLET BY MOUTH TWICE DAILY WITH MEALS 180 tablet 1     NICOTINE POLACRILEX MT Take 4 mg by mouth Take 1/4 tablet several times a day       order for DME Equipment being ordered: Wall Air Conditioner fit to GE Sleeve  [id number] BTU 1 each 0     order for DME Equipment being ordered: 4 wheel walker with seat and brakes.  Item Number: [id number] The KRAIG 1 each 0     RELION LANCETS THIN 26G MISC 2 in 1 [employer] Devices   25 G needle / 1.8 mm 100 each 11     simvastatin (ZOCOR) 20 MG tablet TAKE 1 TABLET BY MOUTH AT BEDTIME 90 tablet 0     venlafaxine (EFFEXOR-XR) 150 MG 24 hr capsule Take 1 capsule (150 mg) by mouth every morning Total Effexor dose per day 225 mg 60 capsule 1     venlafaxine (EFFEXOR-XR) 75 MG 24 hr capsule Take 1 capsule (75 mg) by mouth At Bedtime Total Effexor dose per day 225 mg 60  "capsule 1     VICTOZA PEN 18 MG/3ML soln INJECT 1.8  SUBCUTANEOUSLY ONCE DAILY 9 mL 0     Vitamin D, Cholecalciferol, 1000 units TABS Take 1,000 Units by mouth daily 180 tablet 3     Allergies   Allergen Reactions     Codeine      Copaxone [Glatiramer] Other (See Comments)     Injection site reaction     Morphine      Pneumonia Vaccine: Up to date  Mammogram Screening: Mammogram Screening: Patient over age 50, mutual decision to screen reflected in health maintenance.  Last 3 Pap and HPV Results:   PAP / HPV 7/17/2009   PAP NIL       Review of Systems   Constitutional: Negative for chills and fever.   HENT: Negative for congestion, ear pain, hearing loss and sore throat.    Eyes: Negative for pain and visual disturbance.   Respiratory: Negative for cough and shortness of breath.    Cardiovascular: Positive for peripheral edema. Negative for chest pain and palpitations.   Gastrointestinal: Positive for abdominal pain and heartburn. Negative for constipation, diarrhea, hematochezia and nausea.   Breasts:  Negative for tenderness, breast mass and discharge.   Genitourinary: Positive for urgency. Negative for dysuria, frequency, genital sores, hematuria, pelvic pain, vaginal bleeding and vaginal discharge.   Musculoskeletal: Positive for arthralgias. Negative for joint swelling and myalgias.   Skin: Negative for rash.   Neurological: Positive for weakness and paresthesias. Negative for dizziness and headaches.   Psychiatric/Behavioral: Positive for mood changes. The patient is nervous/anxious.          OBJECTIVE:   /74   Pulse 92   Temp 97.5  F (36.4  C) (Temporal)   Resp 18   Ht 1.69 m (5' 6.54\")   Wt (!) 159.5 kg (351 lb 9.6 oz)   SpO2 96%   BMI 55.84 kg/m   Estimated body mass index is 55.84 kg/m  as calculated from the following:    Height as of this encounter: 1.69 m (5' 6.54\").    Weight as of this encounter: 159.5 kg (351 lb 9.6 oz).  Physical Exam  GENERAL: healthy, alert and no distress  EYES: Eyes " grossly normal to inspection, PERRL and conjunctivae and sclerae normal  HENT: ear canals and TM's normal, nose and mouth without ulcers or lesions  NECK: no adenopathy, no asymmetry, masses, or scars and thyroid normal to palpation  RESP: lungs clear to auscultation - no rales, rhonchi or wheezes  BREAST: normal without masses, tenderness or nipple discharge and no palpable axillary masses or adenopathy  CV: regular rate and rhythm, normal S1 S2, no S3 or S4, no murmur, click or rub, no peripheral edema and peripheral pulses strong  ABDOMEN: soft, nontender, no hepatosplenomegaly, no masses and bowel sounds normal   (female): deferred  MS: no gross musculoskeletal defects noted, no edema  SKIN: no suspicious lesions or rashes  NEURO: Normal strength and tone, mentation intact and speech normal  PSYCH: mentation appears normal, affect normal/bright    Diagnostic Test Results:  Results for orders placed or performed in visit on 07/29/19 (from the past 24 hour(s))   Hemoglobin A1c   Result Value Ref Range    Hemoglobin A1C 8.7 (H) 0 - 5.6 %       ASSESSMENT / PLAN:       ICD-10-CM    1. Encounter for Medicare annual wellness exam Z00.00    2. Annual physical exam Z00.00    3. Need for Td vaccine Z23 TD PRESERV FREE, IM (7+ YRS)   4. Type 2 diabetes mellitus with hyperglycemia, without long-term current use of insulin (H) E11.65 Hemoglobin A1c   5. Mixed hyperlipidemia E78.2 Lipid panel reflex to direct LDL Fasting   6. Moderate episode of recurrent major depressive disorder (H) F33.1    7. MS (multiple sclerosis) (H) G35    8. Essential hypertension with goal blood pressure less than 140/90 I10 Basic metabolic panel   9. Advanced care planning/counseling discussion Z71.89      Diabetes:  - Improved   - Maintain at current Insulin units.   - Recommended bringing in her glucose meter in future to obtain readings  - Encouraged increasing frequency of blood sugars as she may or may not require mealtime insulin but given  "A1c has continued to improve will hold on this for now.   - She is starting aquatic workouts.     HLD  - Due for re-check lipids    Mood:  - Continue with counseling  - For now it seems as though counseling will benefit her more than adjusting medication. Continue on 225 mg of Effexor for now, consider increase to 300 mg if needed.     MS:  - Stable.     HTN:  - Well controlled, continue medication.     ACD:  - They are setting up to have help filling out form.     End of Life Planning:  Patient currently has an advanced directive: No.  I have verified the patient's ablity to prepare an advanced directive/make health care decisions.  Literature was provided to assist patient in preparing an advanced directive.    COUNSELING:  Reviewed preventive health counseling, as reflected in patient instructions       Regular exercise       Healthy diet/nutrition       Fall risk prevention       Immunizations    Updated Td.      She will check with insurance on [employer]       Advanced Planning     Estimated body mass index is 55.84 kg/m  as calculated from the following:    Height as of this encounter: 1.69 m (5' 6.54\").    Weight as of this encounter: 159.5 kg (351 lb 9.6 oz).    Weight management plan: Discussed healthy diet and exercise guidelines     reports that she quit smoking about 17 months ago. She has a 10.00 pack-year smoking history. She has never used smokeless tobacco.      Appropriate preventive services were discussed with this patient, including applicable screening as appropriate for cardiovascular disease, diabetes, osteopenia/osteoporosis, and glaucoma.  As appropriate for age/gender, discussed screening for colorectal cancer, prostate cancer, breast cancer, and cervical cancer. Checklist reviewing preventive services available has been given to the patient.    Reviewed patients plan of care and provided an AVS. The Basic Care Plan (routine screening as documented in Health Maintenance) for Yuki meets the " Care Plan requirement. This Care Plan has been established and reviewed with the Patient.    Counseling Resources:  ATP IV Guidelines  Pooled Cohorts Equation Calculator  Breast Cancer Risk Calculator  FRAX Risk Assessment  ICSI Preventive Guidelines  Dietary Guidelines for Americans, 2010  USDA's MyPlate  ASA Prophylaxis  Lung CA Screening    Roseline Whitten PA-C  [facility]    Identified Health Risks:  Answers for HPI/ROS submitted by the patient on 7/29/2019   If you checked off any problems, how difficult have these problems made it for you to do your work, take care of things at home, or get along with other people?: Somewhat difficult  PHQ9 TOTAL SCORE: 7  TONY 7 TOTAL SCORE: 17

## 2019-07-22 DIAGNOSIS — E11.65 TYPE 2 DIABETES MELLITUS WITH HYPERGLYCEMIA, WITHOUT LONG-TERM CURRENT USE OF INSULIN (H): ICD-10-CM

## 2019-07-23 RX ORDER — LIRAGLUTIDE 6 MG/ML
INJECTION SUBCUTANEOUS
Qty: 9 ML | Refills: 0 | Status: SHIPPED | OUTPATIENT
Start: 2019-07-23 | End: 2019-08-28

## 2019-07-23 NOTE — TELEPHONE ENCOUNTER
[facility]  Medication is being filled for 1 time refill only due to:  Patient needs to be seen because DM and mood recheck.    Next 5 appointments (look out 90 days)    Jul 29, 2019  8:30 AM CDT  PHYS[facility] with Roseline Whitten PA-C  [facility] [address] [address]  [phone]   Jul 31, 2019 12:00 PM CDT  Return Visit with GRETCHEN Fairchild  [facility] [address] [facility] [id number] [phone]

## 2019-07-24 ENCOUNTER — OFFICE VISIT (OUTPATIENT)
Dept: PSYCHOLOGY | Facility: CLINIC | Age: 54
End: 2019-07-24
Attending: PHYSICIAN ASSISTANT
Payer: MEDICARE

## 2019-07-24 DIAGNOSIS — F33.0 DEPRESSION, MAJOR, RECURRENT, MILD (H): Primary | ICD-10-CM

## 2019-07-24 DIAGNOSIS — F41.1 GENERALIZED ANXIETY DISORDER: ICD-10-CM

## 2019-07-24 PROCEDURE — 90791 PSYCH DIAGNOSTIC EVALUATION: CPT | Performed by: SOCIAL WORKER

## 2019-07-24 ASSESSMENT — COLUMBIA-SUICIDE SEVERITY RATING SCALE - C-SSRS
1. IN THE PAST MONTH, HAVE YOU WISHED YOU WERE DEAD OR WISHED YOU COULD GO TO SLEEP AND NOT WAKE UP?: YES
2. HAVE YOU ACTUALLY HAD ANY THOUGHTS OF KILLING YOURSELF?: NO
3. HAVE YOU BEEN THINKING ABOUT HOW YOU MIGHT KILL YOURSELF?: YES
REASONS FOR IDEATION LIFETIME: MOSTLY TO END OR STOP THE PAIN (YOU COULDN'T GO ON LIVING WITH THE PAIN OR HOW YOU WERE FEELING)
4. HAVE YOU HAD THESE THOUGHTS AND HAD SOME INTENTION OF ACTING ON THEM?: NO
6. HAVE YOU EVER DONE ANYTHING, STARTED TO DO ANYTHING, OR PREPARED TO DO ANYTHING TO END YOUR LIFE?: NO
5. HAVE YOU STARTED TO WORK OUT OR WORKED OUT THE DETAILS OF HOW TO KILL YOURSELF? DO YOU INTEND TO CARRY OUT THIS PLAN?: NO
ATTEMPT LIFETIME: NO
TOTAL  NUMBER OF INTERRUPTED ATTEMPTS LIFETIME: NO
6. HAVE YOU EVER DONE ANYTHING, STARTED TO DO ANYTHING, OR PREPARED TO DO ANYTHING TO END YOUR LIFE?: NO
TOTAL  NUMBER OF ABORTED OR SELF INTERRUPTED ATTEMPTS PAST 3 MONTHS: NO
4. HAVE YOU HAD THESE THOUGHTS AND HAD SOME INTENTION OF ACTING ON THEM?: NO
5. HAVE YOU STARTED TO WORK OUT OR WORKED OUT THE DETAILS OF HOW TO KILL YOURSELF? DO YOU INTEND TO CARRY OUT THIS PLAN?: NO
1. IN THE PAST MONTH, HAVE YOU WISHED YOU WERE DEAD OR WISHED YOU COULD GO TO SLEEP AND NOT WAKE UP?: NO
TOTAL  NUMBER OF INTERRUPTED ATTEMPTS PAST 3 MONTHS: NO
TOTAL  NUMBER OF ABORTED OR SELF INTERRUPTED ATTEMPTS PAST LIFETIME: NO
ATTEMPT PAST THREE MONTHS: NO
2. HAVE YOU ACTUALLY HAD ANY THOUGHTS OF KILLING YOURSELF LIFETIME?: YES

## 2019-07-24 ASSESSMENT — PATIENT HEALTH QUESTIONNAIRE - PHQ9
SUM OF ALL RESPONSES TO PHQ QUESTIONS 1-9: 11
5. POOR APPETITE OR OVEREATING: NEARLY EVERY DAY

## 2019-07-24 ASSESSMENT — ANXIETY QUESTIONNAIRES
5. BEING SO RESTLESS THAT IT IS HARD TO SIT STILL: NEARLY EVERY DAY
3. WORRYING TOO MUCH ABOUT DIFFERENT THINGS: NEARLY EVERY DAY
1. FEELING NERVOUS, ANXIOUS, OR ON EDGE: NEARLY EVERY DAY
GAD7 TOTAL SCORE: 18
6. BECOMING EASILY ANNOYED OR IRRITABLE: NEARLY EVERY DAY
IF YOU CHECKED OFF ANY PROBLEMS ON THIS QUESTIONNAIRE, HOW DIFFICULT HAVE THESE PROBLEMS MADE IT FOR YOU TO DO YOUR WORK, TAKE CARE OF THINGS AT HOME, OR GET ALONG WITH OTHER PEOPLE: VERY DIFFICULT
2. NOT BEING ABLE TO STOP OR CONTROL WORRYING: NEARLY EVERY DAY
7. FEELING AFRAID AS IF SOMETHING AWFUL MIGHT HAPPEN: NOT AT ALL

## 2019-07-24 NOTE — Clinical Note
Roseline Donohue,Your patient presented to [facility] for a diagnostic evaluation today.  They will be beginning individual therapy with me. The last week of August Yuki plans to transfer to the new therapist starting at the [facility].Please do not hesitate to contact me if you have any questions in regards to Yuki AGUILAR [place]'s care.    Angelina Jeff, [facility]
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
## 2019-07-25 ASSESSMENT — ANXIETY QUESTIONNAIRES: GAD7 TOTAL SCORE: 18

## 2019-07-29 ENCOUNTER — OFFICE VISIT (OUTPATIENT)
Dept: FAMILY MEDICINE | Facility: OTHER | Age: 54
End: 2019-07-29
Payer: MEDICARE

## 2019-07-29 VITALS
OXYGEN SATURATION: 96 % | BODY MASS INDEX: 45.99 KG/M2 | RESPIRATION RATE: 18 BRPM | WEIGHT: 293 LBS | HEIGHT: 67 IN | TEMPERATURE: 97.5 F | SYSTOLIC BLOOD PRESSURE: 126 MMHG | HEART RATE: 92 BPM | DIASTOLIC BLOOD PRESSURE: 74 MMHG

## 2019-07-29 DIAGNOSIS — E11.65 TYPE 2 DIABETES MELLITUS WITH HYPERGLYCEMIA, WITHOUT LONG-TERM CURRENT USE OF INSULIN (H): ICD-10-CM

## 2019-07-29 DIAGNOSIS — Z71.89 ADVANCED CARE PLANNING/COUNSELING DISCUSSION: ICD-10-CM

## 2019-07-29 DIAGNOSIS — F33.1 MODERATE EPISODE OF RECURRENT MAJOR DEPRESSIVE DISORDER (H): ICD-10-CM

## 2019-07-29 DIAGNOSIS — I10 ESSENTIAL HYPERTENSION WITH GOAL BLOOD PRESSURE LESS THAN 140/90: ICD-10-CM

## 2019-07-29 DIAGNOSIS — Z23 NEED FOR TD VACCINE: ICD-10-CM

## 2019-07-29 DIAGNOSIS — G35 MS (MULTIPLE SCLEROSIS) (H): ICD-10-CM

## 2019-07-29 DIAGNOSIS — E78.2 MIXED HYPERLIPIDEMIA: ICD-10-CM

## 2019-07-29 DIAGNOSIS — Z00.00 ANNUAL PHYSICAL EXAM: ICD-10-CM

## 2019-07-29 DIAGNOSIS — Z00.00 ENCOUNTER FOR MEDICARE ANNUAL WELLNESS EXAM: Primary | ICD-10-CM

## 2019-07-29 LAB
ANION GAP SERPL CALCULATED.3IONS-SCNC: 7 MMOL/L (ref 3–14)
BUN SERPL-MCNC: 11 MG/DL (ref 7–30)
CALCIUM SERPL-MCNC: 9.2 MG/DL (ref 8.5–10.1)
CHLORIDE SERPL-SCNC: 101 MMOL/L (ref 94–109)
CHOLEST SERPL-MCNC: 173 MG/DL
CO2 SERPL-SCNC: 27 MMOL/L (ref 20–32)
CREAT SERPL-MCNC: 0.59 MG/DL (ref 0.52–1.04)
GFR SERPL CREATININE-BSD FRML MDRD: >90 ML/MIN/{1.73_M2}
GLUCOSE SERPL-MCNC: 221 MG/DL (ref 70–99)
HBA1C MFR BLD: 8.7 % (ref 0–5.6)
HDLC SERPL-MCNC: 54 MG/DL
LDLC SERPL CALC-MCNC: 72 MG/DL
NONHDLC SERPL-MCNC: 119 MG/DL
POTASSIUM SERPL-SCNC: 4.2 MMOL/L (ref 3.4–5.3)
SODIUM SERPL-SCNC: 135 MMOL/L (ref 133–144)
TRIGL SERPL-MCNC: 235 MG/DL

## 2019-07-29 PROCEDURE — 90471 IMMUNIZATION ADMIN: CPT | Performed by: PHYSICIAN ASSISTANT

## 2019-07-29 PROCEDURE — 90714 TD VACC NO PRESV 7 YRS+ IM: CPT | Performed by: PHYSICIAN ASSISTANT

## 2019-07-29 PROCEDURE — 80061 LIPID PANEL: CPT | Performed by: PHYSICIAN ASSISTANT

## 2019-07-29 PROCEDURE — 36415 COLL VENOUS BLD VENIPUNCTURE: CPT | Performed by: PHYSICIAN ASSISTANT

## 2019-07-29 PROCEDURE — 80048 BASIC METABOLIC PNL TOTAL CA: CPT | Performed by: PHYSICIAN ASSISTANT

## 2019-07-29 PROCEDURE — 83036 HEMOGLOBIN GLYCOSYLATED A1C: CPT | Performed by: PHYSICIAN ASSISTANT

## 2019-07-29 PROCEDURE — G0438 PPPS, INITIAL VISIT: HCPCS | Performed by: PHYSICIAN ASSISTANT

## 2019-07-29 ASSESSMENT — ENCOUNTER SYMPTOMS
FEVER: 0
DIZZINESS: 0
ARTHRALGIAS: 1
HEMATOCHEZIA: 0
NERVOUS/ANXIOUS: 1
DYSURIA: 0
ABDOMINAL PAIN: 1
PARESTHESIAS: 1
WEAKNESS: 1
COUGH: 0
BREAST MASS: 0
CHILLS: 0
EYE PAIN: 0
MYALGIAS: 0
HEARTBURN: 1
NAUSEA: 0
HEMATURIA: 0
SHORTNESS OF BREATH: 0
FREQUENCY: 0
PALPITATIONS: 0
DIARRHEA: 0
CONSTIPATION: 0
HEADACHES: 0
JOINT SWELLING: 0
SORE THROAT: 0

## 2019-07-29 ASSESSMENT — ANXIETY QUESTIONNAIRES
6. BECOMING EASILY ANNOYED OR IRRITABLE: MORE THAN HALF THE DAYS
2. NOT BEING ABLE TO STOP OR CONTROL WORRYING: NEARLY EVERY DAY
3. WORRYING TOO MUCH ABOUT DIFFERENT THINGS: NEARLY EVERY DAY
7. FEELING AFRAID AS IF SOMETHING AWFUL MIGHT HAPPEN: NOT AT ALL
7. FEELING AFRAID AS IF SOMETHING AWFUL MIGHT HAPPEN: NOT AT ALL
4. TROUBLE RELAXING: NEARLY EVERY DAY
GAD7 TOTAL SCORE: 17
GAD7 TOTAL SCORE: 17
1. FEELING NERVOUS, ANXIOUS, OR ON EDGE: NEARLY EVERY DAY
5. BEING SO RESTLESS THAT IT IS HARD TO SIT STILL: NEARLY EVERY DAY
GAD7 TOTAL SCORE: 17

## 2019-07-29 ASSESSMENT — ACTIVITIES OF DAILY LIVING (ADL)
CURRENT_FUNCTION: SHOPPING REQUIRES ASSISTANCE
CURRENT_FUNCTION: HOUSEWORK REQUIRES ASSISTANCE
CURRENT_FUNCTION: LAUNDRY REQUIRES ASSISTANCE

## 2019-07-29 ASSESSMENT — MIFFLIN-ST. JEOR: SCORE: 2210.09

## 2019-07-29 ASSESSMENT — PATIENT HEALTH QUESTIONNAIRE - PHQ9
SUM OF ALL RESPONSES TO PHQ QUESTIONS 1-9: 7
10. IF YOU CHECKED OFF ANY PROBLEMS, HOW DIFFICULT HAVE THESE PROBLEMS MADE IT FOR YOU TO DO YOUR WORK, TAKE CARE OF THINGS AT HOME, OR GET ALONG WITH OTHER PEOPLE: SOMEWHAT DIFFICULT
SUM OF ALL RESPONSES TO PHQ QUESTIONS 1-9: 7

## 2019-07-29 NOTE — PATIENT INSTRUCTIONS
Patient Education   Personalized Prevention Plan  You are due for the preventive services outlined below.  Your care team is available to assist you in scheduling these services.  If you have already completed any of these items, please share that information with your care team to update in your medical record.  Health Maintenance Due   Topic Date Due     HIV Screening  03/14/1978     Annual Wellness Visit  07/17/2010     Zoster (Shingles) Vaccine (1 of 2) 03/14/2013     Diptheria Tetanus Pertussis (DTAP/TDAP/TD) Vaccine (2 - Td) 07/17/2019     Basic Metabolic Panel  08/21/2019     Cholesterol Lab  08/29/2019

## 2019-07-29 NOTE — NURSING NOTE
Screening Questionnaire for Adult Immunization    Are you sick today?   No   Do you have allergies to medications, food, a vaccine component or latex?   Yes   Have you ever had a serious reaction after receiving a vaccination?   No   Do you have a long-term health problem with heart disease, lung disease, asthma, kidney disease, metabolic disease (e.g. diabetes), anemia, or other blood disorder?   Yes   Do you have cancer, leukemia, HIV/AIDS, or any other immune system problem?   No   In the past 3 months, have you taken medications that affect  your immune system, such as prednisone, other steroids, or anticancer drugs; drugs for the treatment of rheumatoid arthritis, Crohn s disease, or psoriasis; or have you had radiation treatments?   No   Have you had a seizure, or a brain or other nervous system problem?   No   During the past year, have you received a transfusion of blood or blood     products, or been given immune (gamma) globulin or antiviral drug?   No   For women: Are you pregnant or is there a chance you could become        pregnant during the next month?   No   Have you received any vaccinations in the past 4 weeks?   No     Immunization questionnaire was positive for at least one answer.  Notified ES.        Per orders of ES, injection of Td given by Maru Pimentel. Patient instructed to remain in clinic for 15 minutes afterwards, and to report any adverse reaction to me immediately.       Screening performed by Maru Pimentel on 7/29/2019 at 9:24 AM.

## 2019-07-30 ASSESSMENT — ANXIETY QUESTIONNAIRES: GAD7 TOTAL SCORE: 17

## 2019-07-30 ASSESSMENT — PATIENT HEALTH QUESTIONNAIRE - PHQ9: SUM OF ALL RESPONSES TO PHQ QUESTIONS 1-9: 7

## 2019-08-06 DIAGNOSIS — E11.65 TYPE 2 DIABETES MELLITUS WITH HYPERGLYCEMIA, WITHOUT LONG-TERM CURRENT USE OF INSULIN (H): ICD-10-CM

## 2019-08-06 DIAGNOSIS — F33.1 MODERATE EPISODE OF RECURRENT MAJOR DEPRESSIVE DISORDER (H): ICD-10-CM

## 2019-08-07 RX ORDER — GLIPIZIDE 10 MG/1
TABLET ORAL
Qty: 180 TABLET | Refills: 1 | Status: SHIPPED | OUTPATIENT
Start: 2019-08-07

## 2019-08-07 RX ORDER — VENLAFAXINE HYDROCHLORIDE 75 MG/1
CAPSULE, EXTENDED RELEASE ORAL
Qty: 60 CAPSULE | Refills: 1 | Status: SHIPPED | OUTPATIENT
Start: 2019-08-07

## 2019-08-07 RX ORDER — VENLAFAXINE HYDROCHLORIDE 150 MG/1
CAPSULE, EXTENDED RELEASE ORAL
Qty: 60 CAPSULE | Refills: 1 | Status: SHIPPED | OUTPATIENT
Start: 2019-08-07

## 2019-08-07 NOTE — TELEPHONE ENCOUNTER
Effexor  Routing refill request to provider for review/approval because:  A break in medication    Carmen Lambert, RN, BSN

## 2019-08-27 ENCOUNTER — FCC EXTENDED DOCUMENTATION (OUTPATIENT)
Dept: PSYCHOLOGY | Facility: CLINIC | Age: 54
End: 2019-08-27
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
## 2019-08-28 DIAGNOSIS — E11.65 TYPE 2 DIABETES MELLITUS WITH HYPERGLYCEMIA, WITHOUT LONG-TERM CURRENT USE OF INSULIN (H): ICD-10-CM

## 2019-08-29 RX ORDER — LIRAGLUTIDE 6 MG/ML
INJECTION SUBCUTANEOUS
Qty: 9 ML | Refills: 5 | Status: SHIPPED | OUTPATIENT
Start: 2019-08-29

## 2019-08-29 NOTE — TELEPHONE ENCOUNTER
[facility]  Prescription approved per [facility] Refill Protocol.    Carmen Lambert, RN, BSN

## 2019-09-27 ENCOUNTER — HEALTH MAINTENANCE LETTER (OUTPATIENT)
Age: 54
End: 2019-09-27

## 2019-09-30 ENCOUNTER — MYC MEDICAL ADVICE (OUTPATIENT)
Dept: FAMILY MEDICINE | Facility: OTHER | Age: 54
End: 2019-09-30

## 2019-09-30 DIAGNOSIS — E11.65 TYPE 2 DIABETES MELLITUS WITH HYPERGLYCEMIA, WITHOUT LONG-TERM CURRENT USE OF INSULIN (H): Primary | ICD-10-CM

## 2019-09-30 DIAGNOSIS — E11.65 TYPE 2 DIABETES MELLITUS WITH HYPERGLYCEMIA, WITHOUT LONG-TERM CURRENT USE OF INSULIN (H): ICD-10-CM

## 2019-10-01 DIAGNOSIS — E78.2 MIXED HYPERLIPIDEMIA: ICD-10-CM

## 2019-10-01 DIAGNOSIS — E11.65 TYPE 2 DIABETES MELLITUS WITH HYPERGLYCEMIA, WITHOUT LONG-TERM CURRENT USE OF INSULIN (H): ICD-10-CM

## 2019-10-01 RX ORDER — SIMVASTATIN 20 MG
TABLET ORAL
Qty: 90 TABLET | Refills: 2 | Status: SHIPPED | OUTPATIENT
Start: 2019-10-01

## 2019-10-01 RX ORDER — LISINOPRIL/HYDROCHLOROTHIAZIDE 10-12.5 MG
TABLET ORAL
Qty: 90 TABLET | Refills: 2 | Status: SHIPPED | OUTPATIENT
Start: 2019-10-01

## 2019-10-01 NOTE — TELEPHONE ENCOUNTER
Pending Prescriptions:                       Disp   Refills    simvastatin (ZOCOR) 20 MG tablet [Pharmac*90 tab*0            Sig: TAKE 1 TABLET BY MOUTH AT BEDTIME    Prescription approved per [facility] Refill Protocol.        lisinopril-hydrochlorothiazide (PRINZIDE/*90 tab*0            Sig: TAKE 1 TABLET BY MOUTH ONCE DAILY    Prescription approved per [facility] Refill Protocol.      Chelly Quezada, RN, BSN

## 2019-10-03 DIAGNOSIS — E11.65 TYPE 2 DIABETES MELLITUS WITH HYPERGLYCEMIA, WITHOUT LONG-TERM CURRENT USE OF INSULIN (H): ICD-10-CM

## 2019-10-03 LAB
CREAT UR-MCNC: 295 MG/DL
HBA1C MFR BLD: 8.8 % (ref 0–5.6)
MICROALBUMIN UR-MCNC: 57 MG/L
MICROALBUMIN/CREAT UR: 19.46 MG/G CR (ref 0–25)

## 2019-10-03 PROCEDURE — 36415 COLL VENOUS BLD VENIPUNCTURE: CPT | Performed by: PHYSICIAN ASSISTANT

## 2019-10-03 PROCEDURE — 82043 UR ALBUMIN QUANTITATIVE: CPT | Performed by: PHYSICIAN ASSISTANT

## 2019-10-03 PROCEDURE — 83036 HEMOGLOBIN GLYCOSYLATED A1C: CPT | Performed by: PHYSICIAN ASSISTANT

## 2019-10-17 ENCOUNTER — MYC MEDICAL ADVICE (OUTPATIENT)
Dept: FAMILY MEDICINE | Facility: OTHER | Age: 54
End: 2019-10-17

## 2019-10-17 DIAGNOSIS — E11.65 TYPE 2 DIABETES MELLITUS WITH HYPERGLYCEMIA, WITHOUT LONG-TERM CURRENT USE OF INSULIN (H): Primary | ICD-10-CM

## 2019-10-26 ENCOUNTER — HEALTH MAINTENANCE LETTER (OUTPATIENT)
Age: 54
End: 2019-10-26

## 2019-11-06 ENCOUNTER — MEDICAL CORRESPONDENCE (OUTPATIENT)
Dept: HEALTH INFORMATION MANAGEMENT | Facility: CLINIC | Age: 54
End: 2019-11-06

## 2019-11-06 ENCOUNTER — TELEPHONE (OUTPATIENT)
Dept: FAMILY MEDICINE | Facility: OTHER | Age: 54
End: 2019-11-06

## 2019-11-06 NOTE — TELEPHONE ENCOUNTER
Reason for Call:  Form, our goal is to have forms completed with 72 hours, however, some forms may require a visit or additional information.    Type of letter, form or note:  [facility]    Who is the form from?: same (if other please explain)    Where did the form come from: form was faxed in    What clinic location was the form placed at?: [facility] - [phone]    Where the form was placed: box Box/Folder    What number is listed as a contact on the form?: [phone]       Additional comments: fax [phone]    Call taken on 11/6/2019 at 7:41 AM by [place]

## 2019-11-19 ENCOUNTER — MYC MEDICAL ADVICE (OUTPATIENT)
Dept: FAMILY MEDICINE | Facility: OTHER | Age: 54
End: 2019-11-19

## 2019-11-20 NOTE — TELEPHONE ENCOUNTER
Patient reviewed [employer] message and scheduled with ES for 11/22/19.  Eliz Cantu CMA (AAMA)

## 2019-11-22 ENCOUNTER — OFFICE VISIT (OUTPATIENT)
Dept: FAMILY MEDICINE | Facility: OTHER | Age: 54
End: 2019-11-22
Payer: MEDICARE

## 2019-11-22 VITALS
SYSTOLIC BLOOD PRESSURE: 144 MMHG | BODY MASS INDEX: 45.99 KG/M2 | HEART RATE: 94 BPM | HEIGHT: 67 IN | WEIGHT: 293 LBS | DIASTOLIC BLOOD PRESSURE: 82 MMHG | RESPIRATION RATE: 16 BRPM | OXYGEN SATURATION: 98 %

## 2019-11-22 DIAGNOSIS — G35 MS (MULTIPLE SCLEROSIS) (H): ICD-10-CM

## 2019-11-22 DIAGNOSIS — Z71.89 ADVANCED CARE PLANNING/COUNSELING DISCUSSION: ICD-10-CM

## 2019-11-22 DIAGNOSIS — F33.1 MODERATE EPISODE OF RECURRENT MAJOR DEPRESSIVE DISORDER (H): ICD-10-CM

## 2019-11-22 DIAGNOSIS — E11.65 TYPE 2 DIABETES MELLITUS WITH HYPERGLYCEMIA, WITHOUT LONG-TERM CURRENT USE OF INSULIN (H): Primary | ICD-10-CM

## 2019-11-22 DIAGNOSIS — E78.2 MIXED HYPERLIPIDEMIA: ICD-10-CM

## 2019-11-22 DIAGNOSIS — R32 URINARY INCONTINENCE, UNSPECIFIED TYPE: ICD-10-CM

## 2019-11-22 DIAGNOSIS — E66.01 OBESITY, MORBID (H): ICD-10-CM

## 2019-11-22 DIAGNOSIS — Z78.9 POLST (PHYSICIAN ORDERS FOR LIFE-SUSTAINING TREATMENT): ICD-10-CM

## 2019-11-22 DIAGNOSIS — I10 ESSENTIAL HYPERTENSION WITH GOAL BLOOD PRESSURE LESS THAN 140/90: ICD-10-CM

## 2019-11-22 PROCEDURE — 99213 OFFICE O/P EST LOW 20 MIN: CPT | Performed by: PHYSICIAN ASSISTANT

## 2019-11-22 ASSESSMENT — PAIN SCALES - GENERAL: PAINLEVEL: NO PAIN (0)

## 2019-11-22 ASSESSMENT — MIFFLIN-ST. JEOR: SCORE: 2243.73

## 2019-11-22 NOTE — PROGRESS NOTES
Subjective     Yuki Boss is a 54 year old female who presents to clinic today for the following health issues:    HPI     Patient here for a face-to-face for a assisted living facility.  Yuki and her  will be moving to an assisted living facility.   She will benefit greatly from additional assistance.  She has comorbid conditions and conditions that increase her risk for complications or falls.    She has an ACD form filled out but needs notarized.     Current Outpatient Medications   Medication Sig Dispense Refill     acetaminophen 500 MG CAPS Take 500-1,000 mg by mouth every 8 hours as needed (for pain) 180 capsule 1     aspirin 81 MG tablet Take 1 tablet (81 mg) by mouth daily 90 tablet 3     blood glucose monitoring (CONTOUR NEXT MONITOR W/DEVICE KIT) meter device kit Use to test blood sugar 2 times daily or as directed. 1 kit 0     blood glucose monitoring (NO BRAND SPECIFIED) test strip Use to test blood sugar 2 times daily or as directed. 100 each 11     cetirizine (ZYRTEC) 10 MG tablet Take 1 tablet (10 mg) by mouth every evening 90 tablet 3     docusate sodium (COLACE) 100 MG tablet Take 100 mg by mouth daily 180 tablet 1     fluticasone (FLONASE) 50 MCG/ACT spray Spray 1-2 sprays into both nostrils daily 3 Bottle 1     glipiZIDE (GLUCOTROL) 10 MG tablet TAKE 1 TABLET BY MOUTH TWICE DAILY BEFORE MEAL(S) 180 tablet 1     insulin degludec (TRESIBA FLEXTOUCH) 100 UNIT/ML pen Inject 82 Units subcutaneously daily 18 mL 3     insulin pen needle (B-D U/F) 31G X 5 MM miscellaneous Use 1 daily or as directed. 100 each 11     lisinopril-hydrochlorothiazide (PRINZIDE/ZESTORETIC) 10-12.5 MG tablet TAKE 1 TABLET BY MOUTH ONCE DAILY 90 tablet 2     metFORMIN (GLUCOPHAGE) 1000 MG tablet TAKE 1 TABLET BY MOUTH TWICE DAILY WITH MEALS 180 tablet 1     NICOTINE POLACRILEX MT Take 4 mg by mouth Take 1/4 tablet several times a day       order for DME Poise overnight pads 90 each 4     order for DME Equipment  "being ordered: Wall Air Conditioner fit to GE Sleeve  [id number] BTU 1 each 0     order for DME Equipment being ordered: 4 wheel walker with seat and brakes.  Item Number: [id number] The MACK 1 each 0     RELION LANCETS THIN 26G MISC 2 in 1 Lancet Devices   25 G needle / 1.8 mm 100 each 11     simvastatin (ZOCOR) 20 MG tablet TAKE 1 TABLET BY MOUTH AT BEDTIME 90 tablet 2     venlafaxine (EFFEXOR-XR) 150 MG 24 hr capsule TAKE 1 CAPSULE BY MOUTH IN THE MORNING --TOTAL  DOSE  PER  DAY  225  MG 60 capsule 1     venlafaxine (EFFEXOR-XR) 75 MG 24 hr capsule TAKE 1 CAPSULE BY MOUTH AT BEDTIME TOTAL  DOSE  PER  DAY  225MG 60 capsule 1     VICTOZA PEN 18 MG/3ML soln INJECT 1.8 MG SUBCUTANEOUSLY ONCE DAILY. NEED TO BE SEEN BEFORE ANY FURTHER REFILLS 9 mL 5     Vitamin D, Cholecalciferol, 1000 units TABS Take 1,000 Units by mouth daily 180 tablet 3     Allergies   Allergen Reactions     Codeine      Copaxone [Glatiramer] Other (See Comments)     Injection site reaction     Morphine        Reviewed and updated as needed this visit by Provider  Tobacco  Allergies  Meds  Problems  Med Hx  Surg Hx  Fam Hx         Review of Systems   ROS COMP: Constitutional, HEENT, cardiovascular, pulmonary, GI, , musculoskeletal, neuro, skin, endocrine and psych systems are negative, except as otherwise noted.      Objective    BP (!) 144/82 (BP Location: Left arm, Patient Position: Chair, Cuff Size: Adult Large)   Pulse 94   Resp 16   Ht 1.69 m (5' 6.54\")   Wt (!) 162.8 kg (359 lb)   SpO2 98%   BMI 57.01 kg/m    Body mass index is 57.01 kg/m .  Physical Exam   GENERAL: healthy, alert and no distress  MS: no gross musculoskeletal defects noted, no edema  SKIN: no suspicious lesions or rashes  PSYCH: mentation appears normal, affect normal/bright    Diagnostic Test Results:  Labs reviewed in Epic        Assessment & Plan       ICD-10-CM    1. Type 2 diabetes mellitus with hyperglycemia, without long-term current use of insulin (H) " E11.65 insulin degludec (TRESIBA FLEXTOUCH) 100 UNIT/ML pen   2. Urinary incontinence, unspecified type R32 order for DME   3. MS (multiple sclerosis) (H) G35    4. Obesity, morbid (H) E66.01    5. Mixed hyperlipidemia E78.2    6. Essential hypertension with goal blood pressure less than 140/90 I10    7. Moderate episode of recurrent major depressive disorder (H) F33.1    8. POLST (Physician Orders for Life-Sustaining Treatment) Z78.9    9. Advanced care planning/counseling discussion Z71.89        Diabetes:  - Still not well controlled, needs to establish with endocrinology after the move.   - Increase Tresiba to 82 units daily    Medications were reviewed and med list updated.   ACD notarized while in clinic, will can.   POLST was reviewed with patient and her , form was filled out to her wishes and signed, will scan.     Return in about 1 month (around 12/22/2019) for establish with new PCP.     Options for treatment and follow-up care were reviewed with the patient and/or guardian. Patient and/or guardian engaged in the decision making process and verbalized understanding of the options discussed and agreed with the final plan.     Roseline Whitten PA-C  [facility]

## 2019-11-23 ENCOUNTER — MYC MEDICAL ADVICE (OUTPATIENT)
Dept: FAMILY MEDICINE | Facility: OTHER | Age: 54
End: 2019-11-23

## 2019-11-23 DIAGNOSIS — E11.65 TYPE 2 DIABETES MELLITUS WITH HYPERGLYCEMIA, WITHOUT LONG-TERM CURRENT USE OF INSULIN (H): ICD-10-CM

## 2019-11-25 ENCOUNTER — TELEPHONE (OUTPATIENT)
Dept: FAMILY MEDICINE | Facility: OTHER | Age: 54
End: 2019-11-25

## 2019-11-25 NOTE — TELEPHONE ENCOUNTER
Prescription approved per [facility] Refill Protocol.    Carmen Lambert, RN, BSN

## 2019-11-25 NOTE — TELEPHONE ENCOUNTER
Reason for Call:  Other Signed Med List    Detailed comments: [facility] in [place] is requesting a signed med list for the patient. They need a signature on the bottom of each page of the medication list. This is required before the patient can move into senior living. Please fax    Phone Number Patient can be reached at: Other phone number:  Gillian Bustos Fax # [phone]    Best Time: any    Can we leave a detailed message on this number? YES    Call taken on 11/25/2019 at 1:11 PM by Oscar Benson

## 2019-12-02 ENCOUNTER — TELEPHONE (OUTPATIENT)
Dept: FAMILY MEDICINE | Facility: OTHER | Age: 54
End: 2019-12-02

## 2019-12-02 ENCOUNTER — DOCUMENTATION ONLY (OUTPATIENT)
Dept: OTHER | Facility: CLINIC | Age: 54
End: 2019-12-02

## 2019-12-02 PROBLEM — Z71.89 ADVANCED CARE PLANNING/COUNSELING DISCUSSION: Status: RESOLVED | Noted: 2018-08-09 | Resolved: 2019-12-02

## 2019-12-02 NOTE — TELEPHONE ENCOUNTER
Reason for Call:  Form, our goal is to have forms completed with 72 hours, however, some forms may require a visit or additional information.    Type of letter, form or note:  medical    Who is the form from?: [employer] (if other please explain)    Where did the form come from: form was faxed in    What clinic location was the form placed at?: [facility] - [phone]    Where the form was placed: TEAM B Box/Folder    What number is listed as a contact on the form?: [phone]       Additional comments: Please have MD sign and fax back to: [phone]    Call taken on 12/2/2019 at 2:50 PM by Jude Vasquez

## 2019-12-20 ENCOUNTER — RECORDS - HEALTHEAST (OUTPATIENT)
Dept: LAB | Facility: CLINIC | Age: 54
End: 2019-12-20

## 2019-12-20 LAB
ALBUMIN SERPL-MCNC: 3.6 G/DL (ref 3.5–5)
ALP SERPL-CCNC: 79 U/L (ref 45–120)
ALT SERPL W P-5'-P-CCNC: 38 U/L (ref 0–45)
ANION GAP SERPL CALCULATED.3IONS-SCNC: 11 MMOL/L (ref 5–18)
AST SERPL W P-5'-P-CCNC: 18 U/L (ref 0–40)
BILIRUB SERPL-MCNC: 0.2 MG/DL (ref 0–1)
BUN SERPL-MCNC: 7 MG/DL (ref 8–22)
CALCIUM SERPL-MCNC: 9.4 MG/DL (ref 8.5–10.5)
CHLORIDE BLD-SCNC: 101 MMOL/L (ref 98–107)
CO2 SERPL-SCNC: 25 MMOL/L (ref 22–31)
CREAT SERPL-MCNC: 0.68 MG/DL (ref 0.6–1.1)
ERYTHROCYTE [DISTWIDTH] IN BLOOD BY AUTOMATED COUNT: 12.6 % (ref 11–14.5)
GFR SERPL CREATININE-BSD FRML MDRD: >60 ML/MIN/1.73M2
GLUCOSE BLD-MCNC: 224 MG/DL (ref 70–125)
HCT VFR BLD AUTO: 38.7 % (ref 35–47)
HGB BLD-MCNC: 12.3 G/DL (ref 12–16)
MCH RBC QN AUTO: 29.3 PG (ref 27–34)
MCHC RBC AUTO-ENTMCNC: 31.8 G/DL (ref 32–36)
MCV RBC AUTO: 92 FL (ref 80–100)
PLATELET # BLD AUTO: 262 THOU/UL (ref 140–440)
PMV BLD AUTO: 10.7 FL (ref 8.5–12.5)
POTASSIUM BLD-SCNC: 4.1 MMOL/L (ref 3.5–5)
PROT SERPL-MCNC: 7 G/DL (ref 6–8)
RBC # BLD AUTO: 4.2 MILL/UL (ref 3.8–5.4)
SODIUM SERPL-SCNC: 137 MMOL/L (ref 136–145)
TSH SERPL DL<=0.005 MIU/L-ACNC: 0.67 UIU/ML (ref 0.3–5)
VIT B12 SERPL-MCNC: 243 PG/ML (ref 213–816)
WBC: 10.3 THOU/UL (ref 4–11)

## 2019-12-22 LAB
25(OH)D3 SERPL-MCNC: 25.9 NG/ML (ref 30–80)
HBA1C MFR BLD: 8.6 % (ref 4.2–6.1)

## 2020-08-28 ENCOUNTER — RECORDS - HEALTHEAST (OUTPATIENT)
Dept: LAB | Facility: CLINIC | Age: 55
End: 2020-08-28

## 2020-08-31 ENCOUNTER — RECORDS - HEALTHEAST (OUTPATIENT)
Dept: LAB | Facility: CLINIC | Age: 55
End: 2020-08-31

## 2020-08-31 LAB
25(OH)D3 SERPL-MCNC: 28.5 NG/ML (ref 30–80)
ALBUMIN SERPL-MCNC: 3.3 G/DL (ref 3.5–5)
ALP SERPL-CCNC: 77 U/L (ref 45–120)
ALT SERPL W P-5'-P-CCNC: 28 U/L (ref 0–45)
ANION GAP SERPL CALCULATED.3IONS-SCNC: 11 MMOL/L (ref 5–18)
AST SERPL W P-5'-P-CCNC: 21 U/L (ref 0–40)
BILIRUB DIRECT SERPL-MCNC: <0.1 MG/DL
BILIRUB SERPL-MCNC: 0.2 MG/DL (ref 0–1)
BUN SERPL-MCNC: 7 MG/DL (ref 8–22)
CALCIUM SERPL-MCNC: 9.1 MG/DL (ref 8.5–10.5)
CHLORIDE BLD-SCNC: 102 MMOL/L (ref 98–107)
CHOLEST SERPL-MCNC: 158 MG/DL
CO2 SERPL-SCNC: 27 MMOL/L (ref 22–31)
CREAT SERPL-MCNC: 0.72 MG/DL (ref 0.6–1.1)
FASTING STATUS PATIENT QL REPORTED: ABNORMAL
GFR SERPL CREATININE-BSD FRML MDRD: >60 ML/MIN/1.73M2
GLUCOSE BLD-MCNC: 196 MG/DL (ref 70–125)
HDLC SERPL-MCNC: 38 MG/DL
LDLC SERPL CALC-MCNC: 62 MG/DL
POTASSIUM BLD-SCNC: 3.8 MMOL/L (ref 3.5–5)
PROT SERPL-MCNC: 6.7 G/DL (ref 6–8)
SODIUM SERPL-SCNC: 140 MMOL/L (ref 136–145)
TRIGL SERPL-MCNC: 291 MG/DL
TSH SERPL DL<=0.005 MIU/L-ACNC: 0.1 UIU/ML (ref 0.3–5)

## 2020-09-01 LAB
ERYTHROCYTE [DISTWIDTH] IN BLOOD BY AUTOMATED COUNT: 12.9 % (ref 11–14.5)
HCT VFR BLD AUTO: 36.9 % (ref 35–47)
HGB BLD-MCNC: 11.6 G/DL (ref 12–16)
MCH RBC QN AUTO: 27.8 PG (ref 27–34)
MCHC RBC AUTO-ENTMCNC: 31.4 G/DL (ref 32–36)
MCV RBC AUTO: 88 FL (ref 80–100)
PLATELET # BLD AUTO: 249 THOU/UL (ref 140–440)
PMV BLD AUTO: 10 FL (ref 8.5–12.5)
RBC # BLD AUTO: 4.18 MILL/UL (ref 3.8–5.4)
WBC: 8.3 THOU/UL (ref 4–11)

## 2020-09-29 ENCOUNTER — RECORDS - HEALTHEAST (OUTPATIENT)
Dept: LAB | Facility: CLINIC | Age: 55
End: 2020-09-29

## 2020-10-01 LAB
HBA1C MFR BLD: 8.2 %
T3 SERPL-MCNC: 73 NG/DL (ref 45–175)
T4 FREE SERPL-MCNC: 0.7 NG/DL (ref 0.7–1.8)
T4 TOTAL - HISTORICAL: 4.4 UG/DL (ref 4.5–13)
THYROID PEROXIDASE ANTIBODIES - HISTORICAL: 291.7 IU/ML (ref 0–5.6)
TSH SERPL DL<=0.005 MIU/L-ACNC: 0.33 UIU/ML (ref 0.3–5)

## 2020-10-02 ENCOUNTER — RECORDS - HEALTHEAST (OUTPATIENT)
Dept: LAB | Facility: CLINIC | Age: 55
End: 2020-10-02

## 2020-10-02 LAB
ALBUMIN UR-MCNC: NEGATIVE MG/DL
APPEARANCE UR: CLEAR
BACTERIA #/AREA URNS HPF: ABNORMAL HPF
BILIRUB UR QL STRIP: NEGATIVE
COLOR UR AUTO: YELLOW
GLUCOSE UR STRIP-MCNC: ABNORMAL MG/DL
HGB UR QL STRIP: NEGATIVE
KETONES UR STRIP-MCNC: NEGATIVE MG/DL
LEUKOCYTE ESTERASE UR QL STRIP: NEGATIVE
NITRATE UR QL: NEGATIVE
PH UR STRIP: 5 [PH] (ref 4.5–8)
RBC #/AREA URNS AUTO: ABNORMAL HPF
SP GR UR STRIP: 1.01 (ref 1–1.03)
SQUAMOUS #/AREA URNS AUTO: ABNORMAL LPF
UROBILINOGEN UR STRIP-ACNC: ABNORMAL
WBC #/AREA URNS AUTO: ABNORMAL HPF

## 2020-10-03 LAB — THYROID STIMULATING IMMUNOGLOBULIN-TSI - HISTORICAL: <0.1 IU/L

## 2021-01-09 ENCOUNTER — HEALTH MAINTENANCE LETTER (OUTPATIENT)
Age: 56
End: 2021-01-09

## 2021-04-26 NOTE — TELEPHONE ENCOUNTER
Patient states she is testing once daily.    Michelle Gordon RN    
Test strips and 28G lancets      Last Written Prescription Date:  Not on med list  Last Fill Quantity: 0,   # refills: 0  Last Office Visit with [facility] prescribing provider: 03/14/2017  Future Office visit:       Routing refill request to provider for review/approval because:  Drug not active on patient's medication list    Kiet BULL (R)      
Malini Dias [phone]

## 2021-08-28 ENCOUNTER — HEALTH MAINTENANCE LETTER (OUTPATIENT)
Age: 56
End: 2021-08-28

## 2021-10-23 ENCOUNTER — HEALTH MAINTENANCE LETTER (OUTPATIENT)
Age: 56
End: 2021-10-23

## 2022-02-12 ENCOUNTER — HEALTH MAINTENANCE LETTER (OUTPATIENT)
Age: 57
End: 2022-02-12

## 2022-06-02 NOTE — PROGRESS NOTES
SUBJECTIVE/OBJECTIVE:                Yuki Boss is a 52 year old female coming in for a follow-up visit for Medication Therapy Management.  She was referred to me from Patricia Chan CNP.        Chief Complaint: Follow up from our visit on 9/12/2017.  Diabetes  Personal Healthcare Goals: Weight loss  Tobacco: Smokes self-rolled cigarettes 0-1 pack per day - is interested in quittingTobacco Cessation Action Plan: Self help information given to patient  Alcohol: not currently using  Caffeine: 4 cups coffee per day; 1-2 diet sodas daily  Exercise: Has not started pool therapy - still interested ([employer] program). Some walking without walker (been without walker for about a week and a half)    Medication Adherence: no issues reported    Diabetes:  Pt currently taking Victoza 1.2 mg QD, metformin 1000 mg PO BID, glipizide IR 5 mg PO BID. Pt started liraglutide (Victoza) about one month ago. Pt reports some side effects of nausea and diarrhea with the Victoza, especially with the last dose of her first pen. However, she says these symptoms have significantly improved. Pt was previously on pioglitazone before starting the Victoza. She was taken off the pioglitazone because of lower leg edema and weight gain. Her glipizide dose was decreased from 10 mg BID to 5 mg BID with the start of Victoza.    SMBG: one time daily or less. See below for values:     Symptoms of low blood sugar? shaky, dizzy.   Frequency of hypoglycemia? None over the past month  Recent symptoms of high blood sugar? none  Eye exam: due  Foot exam: up to date  Microalbumin is < 30 mg/g. Pt is taking an ACEi/ARB (lisinopril). No side effects reported, such as dry cough  Aspirin: Taking 81mg daily and denies side effects  Exercise: Limited. Walks some. Plans to start pool therapy at [facility] or other fitness center in [place] where one of her friends goes.   Diet:  Meals are delivered now for lunch and dinner  Breakfast: instant, low-sugar  oatmeal (60 grams), with light almond milk.      Obesity: Taking liraglutide (Victoza) for the past month. She has noticed that she cannot eat as much since starting this medication. Her multiple sclerosis makes exercising and getting around difficult. She has been able to ambulate without her cane for the past week and a half. She still uses a motorized cart at the grocery store. She plans to start pool therapy in the near future for more exercise.     Wt Readings from Last 4 Encounters:   10/10/17 (!) 346 lb 12.8 oz (157.3 kg)   09/12/17 (!) 357 lb 8 oz (162.2 kg)   08/28/17 (!) 359 lb 11.2 oz (163.2 kg)   05/24/17 (!) 341 lb 4.8 oz (154.8 kg)     Smoking cessation: Pt smokes hand-rolled cigarettes with pipe tobacco. She says she is getting close to ready to quit. She is starting to dislike going outside to smoke because of the colder weather and expects that by the end of the month it will discourage her enough to attempt quitting.  She has previously stopped smoking for one year by switching to e-cigarettes, but does not want to use this strategy again. She thinks the nicotine patches help, but she has had some issues with bad dreams while using these. She is interested in learning about what her options are. She would like to try and coordinate her quit attempt with starting pool therapy/exercise program. She thinks routine exercise will help her quit.  How much does she smoke:  Equivalent of about one pack per day of hand-rolled cigarettes.   Why does she smoke: stress, habit (with coffee), nervousness, reward, relaxation  Triggers for smoking include:  Stress, nervousness, coffee, driving  How long has she been smoking:  More than 10 years  Tried quitting in the past: Yes.  How many times:  9-10.   What worked/didn t work in the past: Has used E-cigarettes and nicotine patch + lozenge with some success.   Why does she want to quit (motivators for quitting): Does not like going out in the cold to smoke and  will not smoke in the house  Is patient currently pregnant: No  History of seizures/bipolar disease: No    Hypertension: Current medications include lisinopril/HCTZ 10/12.5 mg QD in the AM. Patient does not self-monitor BP.  Patient reports that this medication makes her a little sleepy and have to pee. She says she takes it in the morning.    Hyperlipidemia: Current therapy includes simvastatin 20 mg once daily.  Pt reports no significant myalgias or other side effects. Had experienced myalgias on an earlier trial of simvastatin, but has not had issues since restarting.     Allergies: fluticasone nasal spray two sprays in each nostril daily as needed, cetirizine/loratadine QD. Pt does not use the nasal spray daily. She feels the nasal spray works really well and that her allergies are well-controlled. She says she alternates between cetirizine and loratadine every six months because she doesn't want the medication to lose efficacy.     Vitamin D deficiency: taking vitamin D 5000 IU QD. Reports she has been on this dose for about eight weeks and was started because her vitamin D level was low - thinks it was around 13 ng/mL; level not available through [place] records. She reports that starting this medication has had a positive impact on her energy level. She recently met with neurology, who recommended that she stay on the vitamin D and that she has her primary care provider monitor her vitamin D levels. She purchases over-the-counter.    Multiple sclerosis: Diagnosed in the past year. Seeing a neurologist. Just saw neurologist in early October.  Copaxone for treatment has been recommended. Neurology is working on getting insurance coverage still, so patient has yet to start this medication. Working on diet and exercise at this time.     Anxiety: venlafaxine 75 mg PO BID. Pt feels this medication has helped her a lot. She denies dizziness, nausea, tremor, upset stomach, or any other side effects from this  medication.     TONY-7 SCORE 3/14/2017   Total Score 7   Some recent data might be hidden     PHQ-9 SCORE 7/17/2009 3/14/2017 10/4/2017   Total Score 18 - -   Total Score MyChart - - 3 (Minimal depression)   Total Score - 6 3   Some recent data might be hidden       Current labs include:  BP Readings from Last 3 Encounters:   10/10/17 122/73   09/12/17 137/79   08/28/17 128/64     Today's Vitals: /73  Pulse 95  Wt (!) 346 lb 12.8 oz (157.3 kg)  BMI 54.32 kg/m2  Lab Results   Component Value Date    A1C 8.0 08/28/2017   .  Lab Results   Component Value Date    CHOL 166 03/14/2017     Lab Results   Component Value Date    TRIG 235 03/21/2016     Lab Results   Component Value Date    HDL 51 03/21/2016     Lab Results   Component Value Date    LDL 94 03/14/2017    LDL 65 03/21/2016       Liver Function Studies -   Recent Labs   Lab Test  05/24/17   1207   PROTTOTAL  7.2   ALBUMIN  3.5   BILITOTAL  0.3   ALKPHOS  76   AST  14   ALT  21       Lab Results   Component Value Date    UCRR 157 08/28/2017    MICROL 12 08/28/2017    UMALCR 7.58 08/28/2017       Last Basic Metabolic Panel:  Lab Results   Component Value Date     05/24/2017      Lab Results   Component Value Date    POTASSIUM 4.1 05/24/2017     Lab Results   Component Value Date    CHLORIDE 105 05/24/2017     Lab Results   Component Value Date    BUN 11 05/24/2017     Lab Results   Component Value Date    CR 0.54 05/24/2017     GFR Estimate   Date Value Ref Range Status   05/24/2017 >90  Non  GFR Calc   >60 mL/min/1.7m2 Final   03/14/2017 >90  Non  GFR Calc   >60 mL/min/1.7m2 Final   03/21/2016 >60 >60 ml/min/1.73m2 Final     GFR Estimate If Black   Date Value Ref Range Status   05/24/2017 >90   GFR Calc   >60 mL/min/1.7m2 Final   03/14/2017 >90   GFR Calc   >60 mL/min/1.7m2 Final   03/21/2016 >60 >60 ml/min/1.73m2 Final     TSH   Date Value Ref Range Status   05/24/2017 0.85 0.40 - 4.00  mU/L Final   ]    Most Recent Immunizations   Administered Date(s) Administered     Influenza (H1N1) 12/17/2009     Influenza (IIV3) 12/17/2009     Influenza Vaccine IM 3yrs+ 4 Valent IIV4 08/31/2017     Pneumococcal 23 valent 09/03/2010     TDAP Vaccine (Adacel) 07/17/2009     Tetanus 10/02/1967       ASSESSMENT:              Current medications were reviewed today as discussed above.      Medication Adherence: no issues identified    Diabetes: Needs Improvement. Patient is not meeting A1c goal of < 7%. Self monitored fasting blood glucose not at goal of  mg/dL. Pt eats her biggest meal at dinner time. Her blood glucose readings seem to reflect this with higher fasting levels in the morning compared to the afternoon. The patient is likely receiving inadequate evening meal coverage. Pt would benefit from an evening dose increase of her glipizide to help cover her dinner. Pt may also benefit from increasing the dose of her liraglutide (Victoza). Pt has had no symptoms of hypoglycemia and has had only two blood glucose readings within goal during the past month, with no lows. She has been tolerating the Victoza well lately and has been noticing the added benefit of weight loss with this medication.     Obesity: Improved. Pt has experienced 11 lbs of weight loss over the past month. This is on pace to meet the general weight loss goal of 5% (18 lbs) over 12 weeks. The initiation of Victoza appears to have reduced the patient's meal portion sizes. The patient will also benefit from starting pool therapy.      Smoking cessation: Needs Improvement. Pt continues to use tobacco. However, pt is expecting she will be ready to attempt to quit towards the end of the month due to colder weather. We discussed smoking cessation treatment options and the importance of identifying triggers and possible strategies for controlling these. Bupropion is not a great option for smoking cessation as she is being treated with  venlafaxine for anxiety. Pt has experienced some success using nicotine replacement therapy in the past and is interested in this re-trying this strategy. She still has nicotine lozenges at home. Varenicline (Chantix) may also be a future option for this patient.    Hypertension: Stable. Patient is meeting BP goal of < 140/90mmHg.     Hyperlipidemia: Stable. Pt is on moderate intensity statin. Pt may be considered for a high-intensity statin based on the ACC/AHA 2013 cholesterol guidelines with a current 10-year ASCVD risk of 9.2%, but is meeting her LDL-C goal of <100 mg/dL. Stopping smoking would likely reduce her 10-year ASCVD risk to under 7.5%     Allergic rhinitis: Stable.      Vitamin D deficiency: Needs improvement. Pt is due for vitamin D labs to assess current dosing. Neurology, who andre the original vitamin D lab, appears to want the patient to have her vitamin D levels tracked by her primary care provider.      Multiple sclerosis: Unimproved. Pt is being followed by neurology and will likely be started on Copaxone in the near future.  Uncertain if Copaxone would be considered immunosupression for Prenvar-13, suggest patient ask neurologist.    Anxiety: Stable.     PLAN:                  Diabetes:  Increase Victoza to 1.8 mg once daily  Increase glipizide to 5mg in the AM and 10mg in the evening before meals.    Smoking cessation  Start nicotine patch 21mg once daily, take off at night to reduce incidence of bad dreams.  Start nicotine lozenges 2 mg as needed, RX sent for 2mg nicotine lozenges  Strategies for smoking triggers:  Recommend strategies for controlling smoking triggers, like keeping busy through cleaning the house, going to the gym, crafting, and watching TV; putting lemon/lime in her water; cinnamon sticks; stress ball when stressed or anxious.    Vitamin D deficiency:    Order placed for vitamin D lab to check at next lab appt    Next MTM/pharmacist visit: 1 month    I spent 70 minutes with  this patient today. All changes were made via collaborative practice agreement with Patricia Chan Ra. A copy of the visit note was provided to the patient's primary care provider.     The patient was given a summary of these recommendations as an after visit summary.    Katlin Rosado, PharmD [facility]  Medication Therapy Management Practitioner   #[phone]    Kelvin DowneyD IV Student  [facility] [employer]       High Dose Vitamin A Pregnancy And Lactation Text: High dose vitamin A therapy is contraindicated during pregnancy and breast feeding.

## 2022-10-09 ENCOUNTER — HEALTH MAINTENANCE LETTER (OUTPATIENT)
Age: 57
End: 2022-10-09

## 2023-02-12 ENCOUNTER — HEALTH MAINTENANCE LETTER (OUTPATIENT)
Age: 58
End: 2023-02-12

## 2023-03-25 ENCOUNTER — HEALTH MAINTENANCE LETTER (OUTPATIENT)
Age: 58
End: 2023-03-25

## 2023-08-19 ENCOUNTER — HEALTH MAINTENANCE LETTER (OUTPATIENT)
Age: 58
End: 2023-08-19

## 2024-03-16 ENCOUNTER — HEALTH MAINTENANCE LETTER (OUTPATIENT)
Age: 59
End: 2024-03-16